# Patient Record
Sex: FEMALE | Race: WHITE | Employment: OTHER | URBAN - METROPOLITAN AREA
[De-identification: names, ages, dates, MRNs, and addresses within clinical notes are randomized per-mention and may not be internally consistent; named-entity substitution may affect disease eponyms.]

---

## 2017-05-02 ENCOUNTER — TELEPHONE (OUTPATIENT)
Dept: SURGERY | Age: 77
End: 2017-05-02

## 2017-05-02 ENCOUNTER — OFFICE VISIT (OUTPATIENT)
Dept: SURGERY | Age: 77
End: 2017-05-02

## 2017-05-02 VITALS
SYSTOLIC BLOOD PRESSURE: 148 MMHG | HEART RATE: 87 BPM | BODY MASS INDEX: 28.85 KG/M2 | HEIGHT: 64 IN | WEIGHT: 169 LBS | DIASTOLIC BLOOD PRESSURE: 61 MMHG

## 2017-05-02 DIAGNOSIS — N64.52 DISCHARGE FROM LEFT NIPPLE: Primary | ICD-10-CM

## 2017-05-02 RX ORDER — METRONIDAZOLE 250 MG/1
250 TABLET ORAL
COMMUNITY
Start: 2017-04-24 | End: 2021-01-05

## 2017-05-02 RX ORDER — METOPROLOL TARTRATE 50 MG/1
25 TABLET ORAL 2 TIMES DAILY
COMMUNITY
End: 2019-03-06

## 2017-05-02 RX ORDER — SIMVASTATIN 20 MG/1
TABLET, FILM COATED ORAL
COMMUNITY
End: 2019-02-18

## 2017-05-02 RX ORDER — HYDROCHLOROTHIAZIDE 12.5 MG/1
CAPSULE ORAL
COMMUNITY
Start: 2017-04-24 | End: 2019-02-18

## 2017-05-02 NOTE — PROGRESS NOTES
HISTORY OF PRESENT ILLNESS  Ines Hand is a 69 y.o. female. HPI  NEW patient consult referred by Dr. Blanca Doan for LEFT breast nipple discharge. 9 days ago, she noticed a drop of dark blood drain from her LEFT nipple. She sees discharge now on her bra. Denies palpable lumps. No skin changes or nipple retraction. No pain. Obstetric History       T0      TAB0   SAB0   E0   M0   L0    Obstetric Comments   Menarche:  16. [id number]. # of Children:  0. Age at Delivery of First Child:  n/a. Hysterectomy/oophorectomy:  YES/YES. Breast Bx:  Yes bilateral.  Hx of Breast Feeding:  n/a. BCP:  no. Hormone therapy:  no.      Family History:  Sister had breast cancer age 71 and is a surivor. Niece had ovarian cancer in her 42's and is a survivor. Mammogram done at [facility] in [facility], 2017, - calling for report    Review of Systems   Constitutional: Negative. Feeling too hot   HENT: Negative. Eyes: Negative. Respiratory: Negative. Cardiovascular: Positive for leg swelling. Gastrointestinal: Negative. Genitourinary: Positive for frequency. Musculoskeletal: Positive for back pain and joint pain. Skin: Negative. Neurological: Negative. Endo/Heme/Allergies: Negative. Psychiatric/Behavioral: Negative. Physical Exam   Constitutional: She is oriented to person, place, and time. She appears well-developed and well-nourished. Cardiovascular: Normal rate, regular rhythm and normal heart sounds. Pulmonary/Chest: Effort normal and breath sounds normal. Right breast exhibits no inverted nipple, no mass, no nipple discharge, no skin change and no tenderness. Left breast exhibits nipple discharge (dark brown discharge expressed from the UIQ LEFT nipple). Left breast exhibits no inverted nipple, no mass, no skin change and no tenderness. Breasts are symmetrical.   Lymphadenopathy:     She has no cervical adenopathy.      She has no axillary adenopathy. Right: No supraclavicular adenopathy present. Left: No supraclavicular adenopathy present. Neurological: She is alert and oriented to person, place, and time. Skin: Skin is warm and dry. BREAST ULTRASOUND  Indication: Left nipple discharge  Technique: The area was scanned using a high-frequency linear-array near-field transducer  Findings: dilated subareolar milk ducts. No intracystic mass  Impression: dilated subareolar milk ducts  Disposition: will schedule ductal excision  ASSESSMENT and PLAN    ICD-10-CM ICD-9-CM    1. Discharge from left nipple N64.52 611.79      LEFT breast nipple discharge  Will schedule ductal excision to resolve the discharge and diagnose the source of the discharge. 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 A small, early cancer is a possibility.

## 2017-05-02 NOTE — MR AVS SNAPSHOT
Visit Information Date & Time Provider Department Dept. Phone Encounter #  
 5/2/2017  2:30 PM Letty Miller MD [facility] [phone] [id number] Your Appointments 5/10/2017 11:15 AM  
SURGERY with Letty Miller MD  
[facility] ([facility]) Appt Note: [facility]-LEFT BREAST DUCTAL EXCISION  
 [address]  
  
   
 [place] [address] [address] [facility] Upcoming Health Maintenance Date Due DTaP/Tdap/Td series (1 - Tdap) 1/2/1962 ZOSTER VACCINE AGE 60> 1/2/2001 GLAUCOMA SCREENING Q2Y 1/2/2006 OSTEOPOROSIS SCREENING (DEXA) 1/2/2006 Pneumococcal 65+ Low/Medium Risk (1 of 2 - PCV13) 1/2/2006 MEDICARE YEARLY EXAM 1/2/2006 INFLUENZA AGE 9 TO ADULT 8/1/2017 Allergies as of 5/2/2017  Review Complete On: 5/2/2017 By: Tadeo Landa RN Severity Noted Reaction Type Reactions Codeine  05/02/2017    Nausea Only Morphine  05/02/2017    Nausea Only Current Immunizations  Never Reviewed No immunizations on file. Not reviewed this visit Vitals BP Pulse Height(growth percentile) Weight(growth percentile) BMI OB Status 148/61 87 5' 4\" (1.626 m) 169 lb (76.7 kg) 29.01 kg/m2 Hysterectomy Smoking Status Never Smoker BMI and BSA Data Body Mass Index Body Surface Area  
 29.01 kg/m 2 1.86 m 2 Your Updated Medication List  
  
   
This list is accurate as of: 5/2/17  4:44 PM.  Always use your most recent med list.  
  
  
  
  
 hydroCHLOROthiazide 12.5 mg capsule Commonly known as:  MICROZIDE  
  
 metoprolol tartrate 50 mg tablet Commonly known as:  LOPRESSOR Take 25 mg by mouth two (2) times a day. metroNIDAZOLE 250 mg tablet Commonly known as:  FLAGYL  
  
 simvastatin 20 mg tablet Commonly known as:  ZOCOR Take  by mouth nightly.   
  
  
  
  
To-Do List   
 05/05/2017 3:00 PM  
  Appointment with [facility] PAT ASSESSMENT B at [address] [place] [address] ([phone]) Patient Instructions Nipple Discharge: Care Instructions Your Care Instructions Fluid leaking from one or both nipples when you are not breastfeeding is called nipple discharge. Clear, cloudy, or white discharge that appears only when you press on your nipple is usually normal. The more the nipple is pressed or stimulated, the more fluid appears. Yellow, green, or brown discharge is not normal and may be a symptom of an infection or other problem. Spontaneous discharge appears without pressing or stimulating the nipple. This is not normal unless you are pregnant or breastfeeding. It may be a side effect of a medicine, or it may be caused by other health problems. The treatment of spontaneous nipple discharge depends on what is causing it. You may need additional tests to find out what is causing the nipple discharge. Follow-up care is a key part of your treatment and safety. Be sure to make and go to all appointments, and call your doctor if you are having problems. Its also a good idea to know your test results and keep a list of the medicines you take. How can you care for yourself at home? · If your doctor gave you medicine, take it exactly as prescribed. Call your doctor if you think you are having a problem with your medicine. · Wear a supportive bra, such as a sports bra or jog bra. · Avoid stimulating your breast until you have your follow-up appointment. When should you call for help? Call your doctor now or seek immediate medical care if: 
· You have symptoms of a breast infection, such as: 
¨ Increased pain, swelling, redness, or warmth around a breast. 
¨ Red streaks extending from the breast. 
¨ Pus draining from a breast. 
¨ A fever. Watch closely for changes in your health, and be sure to contact your doctor if: 
· You notice any changes in your breast or discharge. · You do not get better as expected. Where can you learn more? Go to http://candie-anthony.info/. Enter K414 in the search box to learn more about \"Nipple Discharge: Care Instructions. \" Current as of: May 27, 2016 Content Version: 11.2 © 5436-1340 [employer]. Care instructions adapted under license by Tanium (which disclaims liability or warranty for this information). If you have questions about a medical condition or this instruction, always ask your healthcare professional. [address] any warranty or liability for your use of this information. Nipple Discharge: Care Instructions Your Care Instructions Fluid leaking from one or both nipples when you are not breastfeeding is called nipple discharge. Clear, cloudy, or white discharge that appears only when you press on your nipple is usually normal. The more the nipple is pressed or stimulated, the more fluid appears. Yellow, green, or brown discharge is not normal and may be a symptom of an infection or other problem. Spontaneous discharge appears without pressing or stimulating the nipple. This is not normal unless you are pregnant or breastfeeding. It may be a side effect of a medicine, or it may be caused by other health problems. The treatment of spontaneous nipple discharge depends on what is causing it. You may need additional tests to find out what is causing the nipple discharge. Follow-up care is a key part of your treatment and safety. Be sure to make and go to all appointments, and call your doctor if you are having problems. Its also a good idea to know your test results and keep a list of the medicines you take. How can you care for yourself at home? · If your doctor gave you medicine, take it exactly as prescribed. Call your doctor if you think you are having a problem with your medicine. · Wear a supportive bra, such as a sports bra or jog bra. · Avoid stimulating your breast until you have your follow-up appointment. When should you call for help? Call your doctor now or seek immediate medical care if: 
· You have symptoms of a breast infection, such as: 
¨ Increased pain, swelling, redness, or warmth around a breast. 
¨ Red streaks extending from the breast. 
¨ Pus draining from a breast. 
¨ A fever. Watch closely for changes in your health, and be sure to contact your doctor if: 
· You notice any changes in your breast or discharge. · You do not get better as expected. Where can you learn more? Go to http://candie-anthony.info/. Enter W528 in the search box to learn more about \"Nipple Discharge: Care Instructions. \" Current as of: May 27, 2016 Content Version: 11.2 © 1310-7768 [employer]. Care instructions adapted under license by ProZyme (which disclaims liability or warranty for this information). If you have questions about a medical condition or this instruction, always ask your healthcare professional. [place] [address] any warranty or liability for your use of this information. Introducing [facility]! [employer] introduces [employer] patient portal. Now you can access parts of your medical record, email your doctor's office, and request medication refills online. 1. In your internet browser, go to [email or web address] 2. Click on the First Time User? Click Here link in the Sign In box. You will see the New Member Sign Up page. 3. Enter your [employer] Access Code exactly as it appears below. You will not need to use this code after youve completed the sign-up process. If you do not sign up before the expiration date, you must request a new code. · [employer] Access Code: [email or web address] Expires: 7/31/2017  1:57 PM 
 
4.  Enter the last four digits of your Social Security Number (xxxx) and Date of Birth (mm/dd/yyyy) as indicated and click Submit. You will be taken to the next sign-up page. 5. Create a [employer] ID. This will be your [employer] login ID and cannot be changed, so think of one that is secure and easy to remember. 6. Create a [employer] password. You can change your password at any time. 7. Enter your Password Reset Question and Answer. This can be used at a later time if you forget your password. 8. Enter your e-mail address. You will receive e-mail notification when new information is available in [address] 9. Click Sign Up. You can now view and download portions of your medical record. 10. Click the Download Summary menu link to download a portable copy of your medical information. If you have questions, please visit the Frequently Asked Questions section of the [employer] website. Remember, [employer] is NOT to be used for urgent needs. For medical emergencies, dial 911. Now available from your iPhone and Android! Please provide this summary of care documentation to your next provider. If you have any questions after today's visit, please call [phone].

## 2017-05-02 NOTE — PATIENT INSTRUCTIONS
Nipple Discharge: Care Instructions  Your Care Instructions  Fluid leaking from one or both nipples when you are not breastfeeding is called nipple discharge. Clear, cloudy, or white discharge that appears only when you press on your nipple is usually normal. The more the nipple is pressed or stimulated, the more fluid appears. Yellow, green, or brown discharge is not normal and may be a symptom of an infection or other problem. Spontaneous discharge appears without pressing or stimulating the nipple. This is not normal unless you are pregnant or breastfeeding. It may be a side effect of a medicine, or it may be caused by other health problems. The treatment of spontaneous nipple discharge depends on what is causing it. You may need additional tests to find out what is causing the nipple discharge. Follow-up care is a key part of your treatment and safety. Be sure to make and go to all appointments, and call your doctor if you are having problems. Its also a good idea to know your test results and keep a list of the medicines you take. How can you care for yourself at home? · If your doctor gave you medicine, take it exactly as prescribed. Call your doctor if you think you are having a problem with your medicine. · Wear a supportive bra, such as a sports bra or jog bra. · Avoid stimulating your breast until you have your follow-up appointment. When should you call for help? Call your doctor now or seek immediate medical care if:  · You have symptoms of a breast infection, such as:  ¨ Increased pain, swelling, redness, or warmth around a breast.  ¨ Red streaks extending from the breast.  ¨ Pus draining from a breast.  ¨ A fever. Watch closely for changes in your health, and be sure to contact your doctor if:  · You notice any changes in your breast or discharge. · You do not get better as expected. Where can you learn more? Go to http://candie-anthony.info/.   Enter X737 in the search box to learn more about \"Nipple Discharge: Care Instructions. \"  Current as of: May 27, 2016  Content Version: 11.2  © 7069-0344 [employer]. Care instructions adapted under license by Delivery Agent (which disclaims liability or warranty for this information). If you have questions about a medical condition or this instruction, always ask your healthcare professional. [address] any warranty or liability for your use of this information. Nipple Discharge: Care Instructions  Your Care Instructions  Fluid leaking from one or both nipples when you are not breastfeeding is called nipple discharge. Clear, cloudy, or white discharge that appears only when you press on your nipple is usually normal. The more the nipple is pressed or stimulated, the more fluid appears. Yellow, green, or brown discharge is not normal and may be a symptom of an infection or other problem. Spontaneous discharge appears without pressing or stimulating the nipple. This is not normal unless you are pregnant or breastfeeding. It may be a side effect of a medicine, or it may be caused by other health problems. The treatment of spontaneous nipple discharge depends on what is causing it. You may need additional tests to find out what is causing the nipple discharge. Follow-up care is a key part of your treatment and safety. Be sure to make and go to all appointments, and call your doctor if you are having problems. Its also a good idea to know your test results and keep a list of the medicines you take. How can you care for yourself at home? · If your doctor gave you medicine, take it exactly as prescribed. Call your doctor if you think you are having a problem with your medicine. · Wear a supportive bra, such as a sports bra or jog bra. · Avoid stimulating your breast until you have your follow-up appointment. When should you call for help?   Call your doctor now or seek immediate medical care if:  · You have symptoms of a breast infection, such as:  ¨ Increased pain, swelling, redness, or warmth around a breast.  ¨ Red streaks extending from the breast.  ¨ Pus draining from a breast.  ¨ A fever. Watch closely for changes in your health, and be sure to contact your doctor if:  · You notice any changes in your breast or discharge. · You do not get better as expected. Where can you learn more? Go to http://candie-anthony.info/. Enter P989 in the search box to learn more about \"Nipple Discharge: Care Instructions. \"  Current as of: May 27, 2016  Content Version: 11.2  © 2783-3349 [employer]. Care instructions adapted under license by Sk[employer] (which disclaims liability or warranty for this information). If you have questions about a medical condition or this instruction, always ask your healthcare professional. [address] any warranty or liability for your use of this information.

## 2017-05-03 DIAGNOSIS — N64.52 NIPPLE DISCHARGE: Primary | ICD-10-CM

## 2017-05-03 NOTE — TELEPHONE ENCOUNTER
SURGERY SCHEDULED AT [facility] ON 5-10-17 AT 11:45 AM; PATIENT TO ARRIVE AT 9:45 AM  PAT SCHEDULED AT [facility] ON 5-5-17 AT 3 PM; PT TO ARRIVE AT 2:30 PM  PATIENT NOTIFIED

## 2017-05-03 NOTE — COMMUNICATION BODY
HISTORY OF PRESENT ILLNESS  Ramiro Hendrix is a 69 y.o. female. HPI  NEW patient consult referred by Dr. BYERS [facility] for LEFT breast nipple discharge. 9 days ago, she noticed a drop of dark blood drain from her LEFT nipple. She sees discharge now on her bra. Denies palpable lumps. No skin changes or nipple retraction. No pain. Obstetric History       T0      TAB0   SAB0   E0   M0   L0    Obstetric Comments   Menarche:  16. [id number]. # of Children:  0. Age at Delivery of First Child:  n/a. Hysterectomy/oophorectomy:  YES/YES. Breast Bx:  Yes bilateral.  Hx of Breast Feeding:  n/a. BCP:  no. Hormone therapy:  no.      Family History:  Sister had breast cancer age 71 and is a surivor. Niece had ovarian cancer in her 42's and is a survivor. Mammogram done at [facility] in [facility], 2017, - calling for report    Review of Systems   Constitutional: Negative. Feeling too hot   HENT: Negative. Eyes: Negative. Respiratory: Negative. Cardiovascular: Positive for leg swelling. Gastrointestinal: Negative. Genitourinary: Positive for frequency. Musculoskeletal: Positive for back pain and joint pain. Skin: Negative. Neurological: Negative. Endo/Heme/Allergies: Negative. Psychiatric/Behavioral: Negative. Physical Exam   Constitutional: She is oriented to person, place, and time. She appears well-developed and well-nourished. Cardiovascular: Normal rate, regular rhythm and normal heart sounds. Pulmonary/Chest: Effort normal and breath sounds normal. Right breast exhibits no inverted nipple, no mass, no nipple discharge, no skin change and no tenderness. Left breast exhibits nipple discharge (dark brown discharge expressed from the UIQ LEFT nipple). Left breast exhibits no inverted nipple, no mass, no skin change and no tenderness. Breasts are symmetrical.   Lymphadenopathy:     She has no cervical adenopathy.      She has no axillary adenopathy. Right: No supraclavicular adenopathy present. Left: No supraclavicular adenopathy present. Neurological: She is alert and oriented to person, place, and time. Skin: Skin is warm and dry. BREAST ULTRASOUND  Indication: Left nipple discharge  Technique: The area was scanned using a high-frequency linear-array near-field transducer  Findings: dilated subareolar milk ducts. No intracystic mass  Impression: dilated subareolar milk ducts  Disposition: will schedule ductal excision  ASSESSMENT and PLAN    ICD-10-CM ICD-9-CM    1. Discharge from left nipple N64.52 611.79      LEFT breast nipple discharge  Will schedule ductal excision to resolve the discharge and diagnose the source of the discharge. 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 A small, early cancer is a possibility.

## 2017-05-05 ENCOUNTER — HOSPITAL ENCOUNTER (OUTPATIENT)
Dept: PREADMISSION TESTING | Age: 77
Discharge: HOME | End: 2017-05-05
Payer: MEDICARE

## 2017-05-05 VITALS
OXYGEN SATURATION: 96 % | TEMPERATURE: 97.6 F | BODY MASS INDEX: 29.32 KG/M2 | HEART RATE: 70 BPM | SYSTOLIC BLOOD PRESSURE: 148 MMHG | HEIGHT: 64 IN | WEIGHT: 171.74 LBS | RESPIRATION RATE: 16 BRPM | DIASTOLIC BLOOD PRESSURE: 66 MMHG

## 2017-05-05 LAB
ANION GAP BLD CALC-SCNC: 13 MMOL/L (ref 5–15)
ATRIAL RATE: 67 BPM
BUN SERPL-MCNC: 36 MG/DL (ref 6–20)
BUN/CREAT SERPL: 29 (ref 12–20)
CALCIUM SERPL-MCNC: 9.2 MG/DL (ref 8.5–10.1)
CALCULATED P AXIS, ECG09: 50 DEGREES
CALCULATED R AXIS, ECG10: 42 DEGREES
CALCULATED T AXIS, ECG11: 30 DEGREES
CHLORIDE SERPL-SCNC: 103 MMOL/L (ref 97–108)
CO2 SERPL-SCNC: 26 MMOL/L (ref 21–32)
CREAT SERPL-MCNC: 1.26 MG/DL (ref 0.55–1.02)
DIAGNOSIS, 93000: NORMAL
GLUCOSE SERPL-MCNC: 91 MG/DL (ref 65–100)
P-R INTERVAL, ECG05: 186 MS
POTASSIUM SERPL-SCNC: 4 MMOL/L (ref 3.5–5.1)
Q-T INTERVAL, ECG07: 402 MS
QRS DURATION, ECG06: 92 MS
QTC CALCULATION (BEZET), ECG08: 424 MS
SODIUM SERPL-SCNC: 142 MMOL/L (ref 136–145)
VENTRICULAR RATE, ECG03: 67 BPM

## 2017-05-05 PROCEDURE — 80048 BASIC METABOLIC PNL TOTAL CA: CPT | Performed by: ANESTHESIOLOGY

## 2017-05-05 PROCEDURE — 93005 ELECTROCARDIOGRAM TRACING: CPT

## 2017-05-05 PROCEDURE — 36415 COLL VENOUS BLD VENIPUNCTURE: CPT | Performed by: ANESTHESIOLOGY

## 2017-05-05 NOTE — PERIOP NOTES
[facility]  PREOPERATIVE INSTRUCTIONS    Surgery Date:   5/10/2017  Surgery arrival time given by surgeon: Titi Keane by Dr. Elen Salas' office to arrive at 9:45am day of surgery      1. Please report at the designated time to the 2nd [address] [facility]. Bring your insurance card, photo identification, and any copayment ( if applicable). 2. You must have a responsible adult to drive you home. You need to have a responsible adult to stay with you the first 24 hours after surgery if you are going home the same day of your surgery and you should not drive a car for 24 hours following your surgery. 3. Nothing to eat or drink after midnight the night before surgery. This includes no water, gum, mints, coffee, juice, etc.  Please note special instructions, if applicable, below for medications. 4. MEDICATIONS TO TAKE THE MORNING OF SURGERY WITH A SIP OF WATER: Take Metoprolol but hold hydrochlorothiazide morning of surgery. 5. No alcoholic beverages 24 hours before or after your surgery. 6. If you are being admitted to the hospital,please leave personal belongings/luggage in your car until you have an assigned hospital room number. 7. Stop Aspirin and/or any non-steroidal anti-inflammatory drugs (i.e. Ibuprofen, Naproxen, Advil, Aleve) as directed by your surgeon. You may take Tylenol. Stop herbal supplements 1 week prior to  surgery. 8. If you are currently taking Plavix, Coumadin,or any other blood-thinning/anticoagulant medication contact your surgeon for instructions. 9. Please wear comfortable clothes. Wear your glasses instead of contacts. We ask that all money, jewelry and valuables be left at home. Wear no make up, particularly mascara, the day of surgery. 10.  All body piercings, rings,and jewelry need to be removed and left at home. Please wear your hair loose or down. Please no pony-tails, buns, or any metal hair accessories.  If you shower the morning of surgery, please do not apply any lotions, powders, or deodorants afterwards. Do not shave any body area within 24 hours of your surgery. 11. Please follow all instructions to avoid any potential surgical cancellation. 12.  Should your physical condition change, (i.e. fever, cold, flu, etc.) please notify your surgeon as soon as possible. 13. It is important to be on time. If a situation occurs where you may be delayed, please call:  [phone] / [phone] on the day of surgery. 14. The Preadmission Testing staff can be reached at 21 [phone]. .  15. Special instructions: free  parking 7-5    The patient was contacted  in person. She  verbalize  understanding of all instructions does not  need reinforcement.

## 2017-05-09 ENCOUNTER — ANESTHESIA EVENT (OUTPATIENT)
Dept: SURGERY | Age: 77
End: 2017-05-09
Payer: MEDICARE

## 2017-05-10 ENCOUNTER — HOSPITAL ENCOUNTER (OUTPATIENT)
Age: 77
Setting detail: OUTPATIENT SURGERY
Discharge: HOME | End: 2017-05-10
Attending: SURGERY | Admitting: SURGERY
Payer: MEDICARE

## 2017-05-10 ENCOUNTER — ANESTHESIA (OUTPATIENT)
Dept: SURGERY | Age: 77
End: 2017-05-10
Payer: MEDICARE

## 2017-05-10 VITALS
BODY MASS INDEX: 29.09 KG/M2 | WEIGHT: 170.42 LBS | HEART RATE: 78 BPM | RESPIRATION RATE: 18 BRPM | SYSTOLIC BLOOD PRESSURE: 132 MMHG | TEMPERATURE: 98.2 F | DIASTOLIC BLOOD PRESSURE: 51 MMHG | HEIGHT: 64 IN | OXYGEN SATURATION: 96 %

## 2017-05-10 DIAGNOSIS — N64.52 NIPPLE DISCHARGE: ICD-10-CM

## 2017-05-10 PROCEDURE — 74011000250 HC RX REV CODE- 250: Performed by: ANESTHESIOLOGY

## 2017-05-10 PROCEDURE — 77030032490 HC SLV COMPR SCD KNE COVD -B: Performed by: SURGERY

## 2017-05-10 PROCEDURE — 74011000250 HC RX REV CODE- 250: Performed by: SURGERY

## 2017-05-10 PROCEDURE — 76030000000 HC AMB SURG OR TIME 0.5 TO 1: Performed by: SURGERY

## 2017-05-10 PROCEDURE — 77030020782 HC GWN BAIR PAWS FLX 3M -B

## 2017-05-10 PROCEDURE — 77030010507 HC ADH SKN DERMBND J&J -B: Performed by: SURGERY

## 2017-05-10 PROCEDURE — 77030002933 HC SUT MCRYL J&J -A: Performed by: SURGERY

## 2017-05-10 PROCEDURE — 74011250636 HC RX REV CODE- 250/636

## 2017-05-10 PROCEDURE — 74011250636 HC RX REV CODE- 250/636: Performed by: ANESTHESIOLOGY

## 2017-05-10 PROCEDURE — 76210000040 HC AMBSU PH I REC FIRST 0.5 HR: Performed by: SURGERY

## 2017-05-10 PROCEDURE — 88307 TISSUE EXAM BY PATHOLOGIST: CPT | Performed by: SURGERY

## 2017-05-10 PROCEDURE — 77030002996 HC SUT SLK J&J -A: Performed by: SURGERY

## 2017-05-10 PROCEDURE — 74011250637 HC RX REV CODE- 250/637: Performed by: ANESTHESIOLOGY

## 2017-05-10 PROCEDURE — 77030018836 HC SOL IRR NACL ICUM -A: Performed by: SURGERY

## 2017-05-10 PROCEDURE — 76060000061 HC AMB SURG ANES 0.5 TO 1 HR: Performed by: SURGERY

## 2017-05-10 PROCEDURE — 76210000057 HC AMBSU PH II REC 1 TO 1.5 HR: Performed by: SURGERY

## 2017-05-10 PROCEDURE — 77030031139 HC SUT VCRL2 J&J -A: Performed by: SURGERY

## 2017-05-10 RX ORDER — HYDROMORPHONE HYDROCHLORIDE 1 MG/ML
.25-1 INJECTION, SOLUTION INTRAMUSCULAR; INTRAVENOUS; SUBCUTANEOUS
Status: DISCONTINUED | OUTPATIENT
Start: 2017-05-10 | End: 2017-05-10 | Stop reason: HOSPADM

## 2017-05-10 RX ORDER — HYDROCODONE BITARTRATE AND ACETAMINOPHEN 5; 325 MG/1; MG/1
1-2 TABLET ORAL
Qty: 12 TAB | Refills: 0 | Status: SHIPPED | OUTPATIENT
Start: 2017-05-10 | End: 2019-03-06

## 2017-05-10 RX ORDER — PROPOFOL 10 MG/ML
INJECTION, EMULSION INTRAVENOUS AS NEEDED
Status: DISCONTINUED | OUTPATIENT
Start: 2017-05-10 | End: 2017-05-10 | Stop reason: HOSPADM

## 2017-05-10 RX ORDER — ONDANSETRON 2 MG/ML
INJECTION INTRAMUSCULAR; INTRAVENOUS AS NEEDED
Status: DISCONTINUED | OUTPATIENT
Start: 2017-05-10 | End: 2017-05-10 | Stop reason: HOSPADM

## 2017-05-10 RX ORDER — PROPOFOL 10 MG/ML
INJECTION, EMULSION INTRAVENOUS
Status: DISCONTINUED | OUTPATIENT
Start: 2017-05-10 | End: 2017-05-10 | Stop reason: HOSPADM

## 2017-05-10 RX ORDER — NALOXONE HYDROCHLORIDE 0.4 MG/ML
0.2 INJECTION, SOLUTION INTRAMUSCULAR; INTRAVENOUS; SUBCUTANEOUS
Status: DISCONTINUED | OUTPATIENT
Start: 2017-05-10 | End: 2017-05-10 | Stop reason: HOSPADM

## 2017-05-10 RX ORDER — FLUMAZENIL 0.1 MG/ML
0.2 INJECTION INTRAVENOUS
Status: DISCONTINUED | OUTPATIENT
Start: 2017-05-10 | End: 2017-05-10 | Stop reason: HOSPADM

## 2017-05-10 RX ORDER — SODIUM CHLORIDE, SODIUM LACTATE, POTASSIUM CHLORIDE, CALCIUM CHLORIDE 600; 310; 30; 20 MG/100ML; MG/100ML; MG/100ML; MG/100ML
125 INJECTION, SOLUTION INTRAVENOUS CONTINUOUS
Status: DISCONTINUED | OUTPATIENT
Start: 2017-05-10 | End: 2017-05-10 | Stop reason: HOSPADM

## 2017-05-10 RX ORDER — MIDAZOLAM HYDROCHLORIDE 1 MG/ML
INJECTION, SOLUTION INTRAMUSCULAR; INTRAVENOUS AS NEEDED
Status: DISCONTINUED | OUTPATIENT
Start: 2017-05-10 | End: 2017-05-10 | Stop reason: HOSPADM

## 2017-05-10 RX ORDER — LIDOCAINE HYDROCHLORIDE 10 MG/ML
0.1 INJECTION, SOLUTION EPIDURAL; INFILTRATION; INTRACAUDAL; PERINEURAL AS NEEDED
Status: DISCONTINUED | OUTPATIENT
Start: 2017-05-10 | End: 2017-05-10 | Stop reason: HOSPADM

## 2017-05-10 RX ORDER — BUPIVACAINE HYDROCHLORIDE AND EPINEPHRINE 5; 5 MG/ML; UG/ML
INJECTION, SOLUTION EPIDURAL; INTRACAUDAL; PERINEURAL AS NEEDED
Status: DISCONTINUED | OUTPATIENT
Start: 2017-05-10 | End: 2017-05-10 | Stop reason: HOSPADM

## 2017-05-10 RX ORDER — FENTANYL CITRATE 50 UG/ML
INJECTION, SOLUTION INTRAMUSCULAR; INTRAVENOUS AS NEEDED
Status: DISCONTINUED | OUTPATIENT
Start: 2017-05-10 | End: 2017-05-10 | Stop reason: HOSPADM

## 2017-05-10 RX ORDER — METOPROLOL TARTRATE 25 MG/1
25 TABLET, FILM COATED ORAL ONCE
Status: COMPLETED | OUTPATIENT
Start: 2017-05-10 | End: 2017-05-10

## 2017-05-10 RX ORDER — DIPHENHYDRAMINE HYDROCHLORIDE 50 MG/ML
12.5 INJECTION, SOLUTION INTRAMUSCULAR; INTRAVENOUS AS NEEDED
Status: DISCONTINUED | OUTPATIENT
Start: 2017-05-10 | End: 2017-05-10 | Stop reason: HOSPADM

## 2017-05-10 RX ORDER — MIDAZOLAM HYDROCHLORIDE 1 MG/ML
2 INJECTION, SOLUTION INTRAMUSCULAR; INTRAVENOUS
Status: DISCONTINUED | OUTPATIENT
Start: 2017-05-10 | End: 2017-05-10 | Stop reason: HOSPADM

## 2017-05-10 RX ORDER — BUPIVACAINE HYDROCHLORIDE AND EPINEPHRINE 5; 5 MG/ML; UG/ML
30 INJECTION, SOLUTION EPIDURAL; INTRACAUDAL; PERINEURAL
Status: DISCONTINUED | OUTPATIENT
Start: 2017-05-10 | End: 2017-05-10 | Stop reason: HOSPADM

## 2017-05-10 RX ADMIN — MIDAZOLAM HYDROCHLORIDE 2 MG: 1 INJECTION, SOLUTION INTRAMUSCULAR; INTRAVENOUS at 11:03

## 2017-05-10 RX ADMIN — ONDANSETRON 4 MG: 2 INJECTION INTRAMUSCULAR; INTRAVENOUS at 11:30

## 2017-05-10 RX ADMIN — FENTANYL CITRATE 50 MCG: 50 INJECTION, SOLUTION INTRAMUSCULAR; INTRAVENOUS at 11:06

## 2017-05-10 RX ADMIN — SODIUM CHLORIDE, SODIUM LACTATE, POTASSIUM CHLORIDE, AND CALCIUM CHLORIDE 125 ML/HR: 600; 310; 30; 20 INJECTION, SOLUTION INTRAVENOUS at 10:08

## 2017-05-10 RX ADMIN — LIDOCAINE HYDROCHLORIDE 0.1 ML: 10 INJECTION, SOLUTION EPIDURAL; INFILTRATION; INTRACAUDAL; PERINEURAL at 10:08

## 2017-05-10 RX ADMIN — METOPROLOL TARTRATE 25 MG: 25 TABLET ORAL at 11:01

## 2017-05-10 RX ADMIN — FENTANYL CITRATE 50 MCG: 50 INJECTION, SOLUTION INTRAMUSCULAR; INTRAVENOUS at 11:23

## 2017-05-10 RX ADMIN — PROPOFOL 30 MG: 10 INJECTION, EMULSION INTRAVENOUS at 11:09

## 2017-05-10 RX ADMIN — PROPOFOL 50 MCG/KG/MIN: 10 INJECTION, EMULSION INTRAVENOUS at 11:09

## 2017-05-10 NOTE — DISCHARGE INSTRUCTIONS
Discharge Instructions from Dr. Carlos Escobar    Patient Discharge Instructions    Geraldine Joseph / [id number] : 1940    Admitted 5/10/2017 Discharged: 5/10/2017     Pain medication can cause constipation. You may need laxatives, Colace, Prune juice or the like in the post operative period. What to do at Home    Diet:Regular    Activity: As tolerated. No driving if taking pain medication. Wound care: Ice pack to wound 20 minutes per hour for 24 hours. May continue ice pack longer if needed. Okay to shower or take a bath. Dressing: The skin glue is waterproof. It is okay to wash normally at this site. If the glue is still present after 10 days you should peel it off. Pain:  You may use over the counter pain medication (Ibuprofen, acetaminophen etc) or Hydrocodone if strong pain medication is needed. Follow up: One week if needed (call the office for an appointment: [phone]). I will call with results in one week. Problems/Questions: Call Alcira Villanueva MD on my cell phone. [phone]                  How to Care for Your Wound After Its Treated With  DERMABOND®  Topical Skin Adhesive    DERMABOND® Topical Skin Adhesive (2-octyl cyanoacrylate) is a sterile, liquid skin adhesive that holds wound edges together. The film will usually remain in place for 5-10 days, then naturally fall off your skin. The following will answer some of your questions and provide instructions for proper care for your wound while it is healing:    CHECK THE WOUND APPEARANCE   Some swelling, redness, and pain are common with all wounds and normally will go away as the wound heals. If swelling, redness, or pain increase or if the wound feels warm to the touch, contact your doctor. Also contact your doctor if the wound edges reopen or separate. REPLACE BANDAGES   If your wound is bandaged, keep the bandage dry.    Replace the bandage daily until the adhesive film has fallen off of if the bandage should become wet, unless otherwise instructed by your doctor.  When changing the dressing, do not place tape directly over the DERMABOND adhesive film, because removing the tape later may also remove the film. AVOIDING TOPICAL MEDICATIONS   Do not apply liquid or ointment medications or any other product to your wound while the DERMABOND adhesive film is in place. These may loosen the film before your wound is healed. KEEP WOUND DRY AND PROTECTED   You may occasionally and briefly wet your wound in the shower if permitted by your surgeon. Do not soak or scrub your wound, do not swim, and avoid periods of heavy perspiration until the DERMABOND has naturally fallen off. After showering, gently blot your wound dry with a soft towel. If a protective dressing is being used, apply a fresh, dry bandage, being sure to keep the tape off the DERMABOND adhesive film.  Apply a clean, dry bandage over the wound if necessary to protect it.  Protect your wound from injury until the skin has had sufficient time to heal   Do not scratch, rub, or pick at the DERMABOND adhesive film. This may loosen the film before your wound is healed.  Protect the wound from prolonged exposure to sunlight or tanning lamps while the film is in place. If you have any questions or concerns about this product, please consult your doctor. ® DERMABOND is a registered [address] [place] [address]. 2002                DISCHARGE SUMMARY from your Nurse      PATIENT INSTRUCTIONS    After general anesthesia or intravenous sedation, for 24 hours or while taking prescription Narcotics:  · Limit your activities  · Do not drive and operate hazardous machinery  · Do not make important personal or business decisions  · Do  not drink alcoholic beverages  · If you have not urinated within 8 hours after discharge, please contact your surgeon on call.     Report the following to your surgeon:  · Excessive pain, swelling, redness or odor of or around the surgical area  · Temperature over 100.5  · Nausea and vomiting lasting longer than 4 hours or if unable to take medications  · Any signs of decreased circulation or nerve impairment to extremity: change in color, persistent  numbness, tingling, coldness or increase pain  · Any questions      COUGH AND DEEP BREATHE    Breathing deeply and coughing are very important exercises to do after surgery. Deep breathing and coughing open the little air tubes and air sacks in your lungs. You take deep breaths every day. You may not even notice - it is just something you do when you sigh or yawn. It is a natural exercise you do to keep these air passages open. After surgery, take deep breaths and cough, on purpose. DIRECTIONS:  · Take 10 to 15 slow deep breaths every hour while awake. · Breathe in deeply, and hold it for 2 seconds. · Exhale slowly through puckered lips, like blowing up a balloon. · After every 4th or 5th deep breath, hug your pillow to your chest or belly and give a hard, deep cough. Yes, it will probably hurt. But doing this exercise is a very important part of healing after surgery. Take your pain medicine to help you do this exercise without too much pain. Coughing and deep breathing help prevent bronchitis and pneumonia after surgery. If you had chest or belly surgery, use a pillow as a \"hug katelynn\" and hold it tightly to your chest or belly when you cough. ANKLE PUMPS    Ankle pumps increase the circulation of oxygenated blood to your lower extremities and decrease your risk for circulation problems such as blood clots. They also stretch the muscles, tendons and ligaments in your foot and ankle, and prevent joint contracture in the ankle and foot, especially after surgeries on the legs. It is important to do ankle pump exercises regularly after surgery because immobility increases your risk for developing a blood clot.   Your doctor may also have you take an Aspirin for the next few days as well. If your doctor did not ask you to take an Aspirin, consult with him before starting Aspirin therapy on your own. The exercise is quite simple. · Slowly point your foot forward, feeling the muscles on the top of your lower leg stretch, and hold this position for 5 seconds. · Next, pull your foot back toward you as far as possible, stretching the calf muscles, and hold that position for 5 seconds. · Repeat with the other foot. · Perform 10 repetitions every hour while awake for both ankles if possible (down and then up with the foot once is one repetition). You should feel gentle stretching of the muscles in your lower leg when doing this exercise. If you feel pain, or your range of motion is limited, don't push too hard. Only go the limit your joint and muscles will let you go. If you have increasing pain, progressively worsening leg warmth or swelling, STOP the exercise and call your doctor. MEDICATION AND   SIDE EFFECT GUIDE    The [facility] MEDICATION AND SIDE EFFECT GUIDE was provided to the PATIENT AND CARE PROVIDER. Information provided includes instruction about drug purpose and common side effects for the following medications:   · HYDROCODONE/ACETAMINOPHEN        These are general instructions for a healthy lifestyle:    *   Please give a list of your current medications to your Primary Care Provider. *   Please update this list whenever your medications are discontinued, doses are changed, or new medications (including over-the-counter products) are added. *   Please carry medication information at all times in case of emergency situations. About Smoking  No smoking / No tobacco products  Avoid exposure to second hand smoke     Surgeon General's Warning:  Quitting smoking now greatly reduces serious risk to your health.     Obesity, smoking, and sedentary lifestyle greatly increases your risk for illness and disease. A healthy diet, regular physical exercise & weight monitoring are important for maintaining a healthy lifestyle. Congestive Heart Failure  You may be retaining fluid if you have a history of heart failure or if you experience any of the following symptoms:  Weight gain of 3 pounds or more overnight or 5 pounds in a week, increased swelling in your hands or feet or shortness of breath while lying flat in bed. Please call your doctor as soon as you notice any of these symptoms; do not wait until your next office visit. Recognize signs and symptoms of STROKE:  F -  Face looks uneven  A -  Arms unable to move or move evenly  S -  Speech slurred or non-existent  T -  Time-call 911 as soon as signs and symptoms begin-DO NOT go          back to bed or wait to see if you get better-TIME IS BRAIN. Warning Signs of HEART ATTACK   Call 911 if you have these symptoms:     Chest discomfort. Most heart attacks involve discomfort in the center of the chest that lasts more than a few minutes, or that goes away and comes back. It can feel like uncomfortable pressure, squeezing, fullness, or pain.  Discomfort in other areas of the upper body. Symptoms can include pain or discomfort in one or both arms, the back, neck, jaw, or stomach.  Shortness of breath with or without chest discomfort.  Other signs may include breaking out in a cold sweat, nausea, or lightheadedness. Don't wait more than five minutes to call 911 - MINUTES MATTER! Fast action can save your life. Calling 911 is almost always the fastest way to get lifesaving treatment. Emergency Medical Services staff can begin treatment when they arrive -- up to an hour sooner than if someone gets to the hospital by car. The discharge information has been reviewed with the patient and caregiver. Any questions and concerns from the patient and caregiver have been addressed.   The patient and caregiver verbalized understanding. Other information in your discharge envelope:  [x]     PRESCRIPTIONS  []     PHYSICAL THERAPY PRESCRIPTION  []     APPOINTMENT CARDS  []     Regional Anesthesia Pamphlet for block or block with On-Q Catheter from   Anesthesia Service  []     Medical device information sheets/pamphlets from their    []     School/work excuse note. []     /parent work excuse note. The following personal items collected during your admission are returned to you:   Dental Appliance: Dental Appliances: Uppers  Vision:    Hearing Aid:    Jewelry: Jewelry: None  Clothing: Clothing: Pants, Undergarments, Shirt, Footwear  Other Valuables:  Other Valuables: None  Valuables sent to safe: Personal Items Sent to Safe: n/a

## 2017-05-10 NOTE — H&P (VIEW-ONLY)
HISTORY OF PRESENT ILLNESS  Dora Lockwood is a 69 y.o. female. HPI  NEW patient consult referred by Dr. Debby Mcneill for LEFT breast nipple discharge. 9 days ago, she noticed a drop of dark blood drain from her LEFT nipple. She sees discharge now on her bra. Denies palpable lumps. No skin changes or nipple retraction. No pain. Obstetric History       T0      TAB0   SAB0   E0   M0   L0    Obstetric Comments   Menarche:  16. [id number]. # of Children:  0. Age at Delivery of First Child:  n/a. Hysterectomy/oophorectomy:  YES/YES. Breast Bx:  Yes bilateral.  Hx of Breast Feeding:  n/a. BCP:  no. Hormone therapy:  no.      Family History:  Sister had breast cancer age 71 and is a surivor. Niece had ovarian cancer in her 42's and is a survivor. Mammogram done at [facility] in [facility], 2017, - calling for report    Review of Systems   Constitutional: Negative. Feeling too hot   HENT: Negative. Eyes: Negative. Respiratory: Negative. Cardiovascular: Positive for leg swelling. Gastrointestinal: Negative. Genitourinary: Positive for frequency. Musculoskeletal: Positive for back pain and joint pain. Skin: Negative. Neurological: Negative. Endo/Heme/Allergies: Negative. Psychiatric/Behavioral: Negative. Physical Exam   Constitutional: She is oriented to person, place, and time. She appears well-developed and well-nourished. Cardiovascular: Normal rate, regular rhythm and normal heart sounds. Pulmonary/Chest: Effort normal and breath sounds normal. Right breast exhibits no inverted nipple, no mass, no nipple discharge, no skin change and no tenderness. Left breast exhibits nipple discharge (dark brown discharge expressed from the UIQ LEFT nipple). Left breast exhibits no inverted nipple, no mass, no skin change and no tenderness. Breasts are symmetrical.   Lymphadenopathy:     She has no cervical adenopathy.      She has no axillary adenopathy. Right: No supraclavicular adenopathy present. Left: No supraclavicular adenopathy present. Neurological: She is alert and oriented to person, place, and time. Skin: Skin is warm and dry. BREAST ULTRASOUND  Indication: Left nipple discharge  Technique: The area was scanned using a high-frequency linear-array near-field transducer  Findings: dilated subareolar milk ducts. No intracystic mass  Impression: dilated subareolar milk ducts  Disposition: will schedule ductal excision  ASSESSMENT and PLAN    ICD-10-CM ICD-9-CM    1. Discharge from left nipple N64.52 611.79      LEFT breast nipple discharge  Will schedule ductal excision to resolve the discharge and diagnose the source of the discharge. 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 A small, early cancer is a possibility.

## 2017-05-10 NOTE — ANESTHESIA POSTPROCEDURE EVALUATION
Post-Anesthesia Evaluation and Assessment    Patient: Carlos Ocasio MRN: [id number]  SSN: [id number]    YOB: 1940  Age: 69 y.o. Sex: female       Cardiovascular Function/Vital Signs  Visit Vitals    /51 (BP 1 Location: Left arm, BP Patient Position: At rest;Head of bed elevated (Comment degrees))    Pulse 78    Temp 36.8 °C (98.2 °F)    Resp 18    Ht 5' 4\" (1.626 m)    Wt 77.3 kg (170 lb 6.7 oz)    SpO2 96%    BMI 29.25 kg/m2       Patient is status post MAC anesthesia for Procedure(s):  LEFT BREAST DUCTAL EXCISION. Nausea/Vomiting: None    Postoperative hydration reviewed and adequate. Pain:  Pain Scale 1: Numeric (0 - 10) (05/10/17 1222)  Pain Intensity 1: 0 (05/10/17 1222)   Managed    Neurological Status:   Neuro (WDL): Within Defined Limits (05/10/17 1222)  Neuro  Neurologic State: Alert (05/10/17 1222)  LUE Motor Response: Purposeful (05/10/17 1222)  LLE Motor Response: Purposeful (05/10/17 1222)  RUE Motor Response: Purposeful (05/10/17 1222)  RLE Motor Response: Purposeful (05/10/17 1222)   At baseline    Mental Status and Level of Consciousness: Arousable    Pulmonary Status:   O2 Device: Room air (05/10/17 1222)   Adequate oxygenation and airway patent    Complications related to anesthesia: None    Post-anesthesia assessment completed.  No concerns    Signed By: Adi Sapp MD     May 10, 2017

## 2017-05-10 NOTE — INTERVAL H&P NOTE
H&P Update:  Erica Randall was seen and examined. History and physical has been reviewed. The patient has been examined.  There have been no significant clinical changes since the completion of the originally dated History and Physical.    Signed By: Zaida Oneill MD     May 10, 2017 9:50 AM

## 2017-05-10 NOTE — ANESTHESIA PREPROCEDURE EVALUATION
Anesthetic History   No history of anesthetic complications            Review of Systems / Medical History  Patient summary reviewed, nursing notes reviewed and pertinent labs reviewed    Pulmonary  Within defined limits                 Neuro/Psych   Within defined limits           Cardiovascular  Within defined limits            Hyperlipidemia    Exercise tolerance: >4 METS  Comments: metoprolol tartrate (LOPRESSOR)25 mg tablet BID   5/9/2017 at 2200 but due for dose now which I will order        GI/Hepatic/Renal  Within defined limits              Endo/Other  Within defined limits           Other Findings   Comments: AAA repair at [facility], states released from care end of 2007    Lopressor for HR control per patient    H/o GI bleed           Physical Exam    Airway  Mallampati: II  TM Distance: 4 - 6 cm  Neck ROM: normal range of motion   Mouth opening: Normal     Cardiovascular  Regular rate and rhythm,  S1 and S2 normal,  no murmur, click, rub, or gallop  Rhythm: regular  Rate: normal         Dental    Dentition: Full upper dentures     Pulmonary  Breath sounds clear to auscultation               Abdominal  GI exam deferred       Other Findings            Anesthetic Plan    ASA: 3  Anesthesia type: MAC            Anesthetic plan and risks discussed with: Patient

## 2017-05-10 NOTE — OP NOTES
[facility] [place] [address]  [address] [facility] [address]    Name: Ham Dacosta  : 1940    Ductal Excision   Operative Report    Date of Surgery: 5/10/2017   Preoperative Diagnosis: LEFT nipple discharge  Postoperative Diagnosis: same  Surgeon: Dr Claudia Barrett  Anesthesia: MAC  Procedure:  Procedure(s):  LEFT BREAST DUCTAL EXCISION   Indication: LEFT nipple discharge  Procedure in Detail:  The patient was sedated with IV sedation. The Left breast was prepped and draped in the usual sterile manner. 0.5% marcaine with epinephrine was injected for local anesthesia and post operative pain control  Patient had dark brown discharge from the Left nipple. A 2 cm periareolar incision was made along the inferior periareolar border and the ductal tissue was excised. The specimen was sent to Pathology. Hemostasis was controlled with electrocautery. Subcutaneous tissues were reapproximated with 3-0 Vicryl suture. The skin was closed with a running 4-0 Monocryl suture. The wound was dressed with skin glue. The patient was awakened and taken to the recovery room. Sponge, needle and instrument counts were reported to be correct. Findings:  No discharge after the ductal tissue was removed.     Estimated Blood Loss:  10 ml           Specimens:   ID Type Source Tests Collected by Time Destination   1 : left breast ductal excision Preservative Breast  Krysta Whitman MD 5/10/2017 1121 Pathology        Signed By: Krysta Whitman MD

## 2017-05-10 NOTE — IP AVS SNAPSHOT
[address] [address] 1007 [facility] 
[phone] Patient: Magda Quevedo MRN: [id number] LLV:[birth year] You are allergic to the following Allergen Reactions Codeine Nausea Only Morphine Nausea Only Recent Documentation Height Weight BMI OB Status Smoking Status 1.626 m 77.3 kg 29.25 kg/m2 Hysterectomy Never Smoker Emergency Contacts Name Discharge Info Relation Home Work Mobile Ryan Rivera  Other Relative [6] [phone] About your hospitalization You were admitted on:  May 10, 2017 You last received care in the:  [facility] You were discharged on:  May 10, 2017 Unit phone number:  [phone] Why you were hospitalized Your primary diagnosis was:  Not on File Providers Seen During Your Hospitalizations Provider Role Specialty Primary office phone Mera Manzano MD Attending Provider Breast Surgery [phone] Your Primary Care Physician (PCP) Primary Care Physician Office Phone Office Fax Msaon Gudino [phone] Follow-up Information Follow up With Details Comments Contact Info Stephanie Zafar MD   [address] [place] [address] 
[phone] Mera Manzano MD  As needed [address] 
[phone] Current Discharge Medication List  
  
START taking these medications Dose & Instructions Dispensing Information Comments Morning Noon Evening Bedtime HYDROcodone-acetaminophen 5-325 mg per tablet Commonly known as:  [address] Your last dose was: Your next dose is:    
   
   
 Dose:  1-2 Tab Take 1-2 Tabs by mouth every four (4) hours as needed for Pain. Max Daily Amount: 12 Tabs. Quantity:  12 Tab Refills:  0 CONTINUE these medications which have NOT CHANGED Dose & Instructions Dispensing Information Comments Morning Noon Evening Bedtime  
 hydroCHLOROthiazide 12.5 mg capsule Commonly known as:  Richie Treviño Your last dose was: Your next dose is:    
   
   
  Refills:  0  
     
   
   
   
  
 metoprolol tartrate 50 mg tablet Commonly known as:  LOPRESSOR Your last dose was: Your next dose is:    
   
   
 Dose:  25 mg Take 25 mg by mouth two (2) times a day. States she takes 1/2 of a 50mg pill twice daily Refills:  0  
     
   
   
   
  
 metroNIDAZOLE 250 mg tablet Commonly known as:  FLAGYL Your last dose was: Your next dose is:    
   
   
  Refills:  0  
     
   
   
   
  
 simvastatin 20 mg tablet Commonly known as:  ZOCOR Your last dose was: Your next dose is: Take  by mouth nightly. Refills:  0 Where to Get Your Medications Information on where to get these meds will be given to you by the nurse or doctor. ! Ask your nurse or doctor about these medications HYDROcodone-acetaminophen 5-325 mg per tablet Discharge Instructions Discharge Instructions from Dr. Ny Eaton Patient Discharge Instructions Tay De La Paz / [id number] : 1940 Admitted 5/10/2017 Discharged: 5/10/2017 Pain medication can cause constipation. You may need laxatives, Colace, Prune juice or the like in the post operative period. What to do at [facility] Diet:Regular Activity: As tolerated. No driving if taking pain medication. Wound care: Ice pack to wound 20 minutes per hour for 24 hours. May continue ice pack longer if needed. Okay to shower or take a bath. Dressing: The skin glue is waterproof. It is okay to wash normally at this site. If the glue is still present after 10 days you should peel it off. Pain:  You may use over the counter pain medication (Ibuprofen, acetaminophen etc) or Hydrocodone if strong pain medication is needed. Follow up: One week if needed (call the office for an appointment: [phone]). I will call with results in one week. Problems/Questions: Call Talia Costa MD on my cell phone. [phone] How to Care for Your Wound After Its Treated With DERMABOND®  Topical Skin Adhesive DERMABOND® Topical Skin Adhesive (2-octyl cyanoacrylate) is a sterile, liquid skin adhesive that holds wound edges together. The film will usually remain in place for 5-10 days, then naturally fall off your skin. The following will answer some of your questions and provide instructions for proper care for your wound while it is healing: CHECK THE WOUND APPEARANCE 
? Some swelling, redness, and pain are common with all wounds and normally will go away as the wound heals. If swelling, redness, or pain increase or if the wound feels warm to the touch, contact your doctor. Also contact your doctor if the wound edges reopen or separate. REPLACE BANDAGES 
? If your wound is bandaged, keep the bandage dry. ? Replace the bandage daily until the adhesive film has fallen off of if the bandage should become wet, unless otherwise instructed by your doctor. ? When changing the dressing, do not place tape directly over the DERMABOND adhesive film, because removing the tape later may also remove the film. AVOIDING TOPICAL MEDICATIONS ? Do not apply liquid or ointment medications or any other product to your wound while the DERMABOND adhesive film is in place. These may loosen the film before your wound is healed. KEEP WOUND DRY AND PROTECTED ? You may occasionally and briefly wet your wound in the shower if permitted by your surgeon.   Do not soak or scrub your wound, do not swim, and avoid periods of heavy perspiration until the DERMABOND has naturally fallen off. After showering, gently blot your wound dry with a soft towel. If a protective dressing is being used, apply a fresh, dry bandage, being sure to keep the tape off the DERMABOND adhesive film. ? Apply a clean, dry bandage over the wound if necessary to protect it. ? Protect your wound from injury until the skin has had sufficient time to heal 
? Do not scratch, rub, or pick at the DERMABOND adhesive film. This may loosen the film before your wound is healed. ? Protect the wound from prolonged exposure to sunlight or tanning lamps while the film is in place. If you have any questions or concerns about this product, please consult your doctor. ® DERMABOND is a registered [address] [place] [address] of [address] [place] [address] 800 [facility] 2002 DISCHARGE SUMMARY from your Nurse PATIENT INSTRUCTIONS After general anesthesia or intravenous sedation, for 24 hours or while taking prescription Narcotics: · Limit your activities · Do not drive and operate hazardous machinery · Do not make important personal or business decisions · Do  not drink alcoholic beverages · If you have not urinated within 8 hours after discharge, please contact your surgeon on call. Report the following to your surgeon: 
· Excessive pain, swelling, redness or odor of or around the surgical area · Temperature over 100.5 · Nausea and vomiting lasting longer than 4 hours or if unable to take medications · Any signs of decreased circulation or nerve impairment to extremity: change in color, persistent  numbness, tingling, coldness or increase pain · Any questions [address] Breathing deeply and coughing are very important exercises to do after surgery. Deep breathing and coughing open the little air tubes and air sacks in your lungs. You take deep breaths every day. You may not even notice - it is just something you do when you sigh or yawn.   It is a natural exercise you do to keep these air passages open. After surgery, take deep breaths and cough, on purpose. DIRECTIONS: 
· Take 10 to 15 slow deep breaths every hour while awake. · Breathe in deeply, and hold it for 2 seconds. · Exhale slowly through puckered lips, like blowing up a balloon. · After every 4th or 5th deep breath, hug your pillow to your chest or belly and give a hard, deep cough. Yes, it will probably hurt. But doing this exercise is a very important part of healing after surgery. Take your pain medicine to help you do this exercise without too much pain. Coughing and deep breathing help prevent bronchitis and pneumonia after surgery. If you had chest or belly surgery, use a pillow as a \"hug katelynn\" and hold it tightly to your chest or belly when you cough. ANKLE PUMPS Ankle pumps increase the circulation of oxygenated blood to your lower extremities and decrease your risk for circulation problems such as blood clots. They also stretch the muscles, tendons and ligaments in your foot and ankle, and prevent joint contracture in the ankle and foot, especially after surgeries on the legs. It is important to do ankle pump exercises regularly after surgery because immobility increases your risk for developing a blood clot. Your doctor may also have you take an Aspirin for the next few days as well. If your doctor did not ask you to take an Aspirin, consult with him before starting Aspirin therapy on your own. The exercise is quite simple. · Slowly point your foot forward, feeling the muscles on the top of your lower leg stretch, and hold this position for 5 seconds. · Next, pull your foot back toward you as far as possible, stretching the calf muscles, and hold that position for 5 seconds. · Repeat with the other foot. · Perform 10 repetitions every hour while awake for both ankles if possible (down and then up with the foot once is one repetition). You should feel gentle stretching of the muscles in your lower leg when doing this exercise. If you feel pain, or your range of motion is limited, don't push too hard. Only go the limit your joint and muscles will let you go. If you have increasing pain, progressively worsening leg warmth or swelling, STOP the exercise and call your doctor. MEDICATION AND  
SIDE EFFECT GUIDE The [address] [facility] EFFECT GUIDE was provided to the PATIENT AND CARE PROVIDER. Information provided includes instruction about drug purpose and common side effects for the following medications:  
· HYDROCODONE/ACETAMINOPHEN These are general instructions for a healthy lifestyle: *   Please give a list of your current medications to your Primary Care Provider. *   Please update this list whenever your medications are discontinued, doses are changed, or new medications (including over-the-counter products) are added. *   Please carry medication information at all times in case of emergency situations. About Smoking No smoking / No tobacco products Avoid exposure to second hand smoke Surgeon General's Warning:  Quitting smoking now greatly reduces serious risk to your health. Obesity, smoking, and sedentary lifestyle greatly increases your risk for illness and disease. A healthy diet, regular physical exercise & weight monitoring are important for maintaining a healthy lifestyle. Congestive Heart Failure You may be retaining fluid if you have a history of heart failure or if you experience any of the following symptoms:  Weight gain of 3 pounds or more overnight or 5 pounds in a week, increased swelling in your hands or feet or shortness of breath while lying flat in bed. Please call your doctor as soon as you notice any of these symptoms; do not wait until your next office visit. Recognize signs and symptoms of STROKE: 
F -  Face looks uneven A -  Arms unable to move or move evenly S -  Speech slurred or non-existent T -  Time-call 911 as soon as signs and symptoms begin-DO NOT go  
       back to bed or wait to see if you get better-TIME IS BRAIN. Warning Signs of HEART ATTACK Call 911 if you have these symptoms: 
 
? Chest discomfort. Most heart attacks involve discomfort in the center of the chest that lasts more than a few minutes, or that goes away and comes back. It can feel like uncomfortable pressure, squeezing, fullness, or pain. ? Discomfort in other areas of the upper body. Symptoms can include pain or discomfort in one or both arms, the back, neck, jaw, or stomach. ? Shortness of breath with or without chest discomfort. ? Other signs may include breaking out in a cold sweat, nausea, or lightheadedness. Don't wait more than five minutes to call [address]! Fast action can save your life. Calling 911 is almost always the fastest way to get lifesaving treatment. Emergency Medical Services staff can begin treatment when they arrive  up to an hour sooner than if someone gets to the hospital by car. The discharge information has been reviewed with the patient and caregiver. Any questions and concerns from the patient and caregiver have been addressed. The patient and caregiver verbalized understanding. Other information in your discharge envelope: 
[x]     PRESCRIPTIONS []     PHYSICAL THERAPY PRESCRIPTION 
[]     APPOINTMENT CARDS []     Regional Anesthesia Pamphlet for block or block with On-Q Catheter from   Anesthesia Service []     Medical device information sheets/pamphlets from their   
[]     School/work excuse note. []     /parent work excuse note. The following personal items collected during your admission are returned to you:  
Dental Appliance: Dental Appliances: Uppers Vision:   
Hearing Aid:   
Jewelry: Jewelry: None Clothing: Clothing: Pants, Undergarments, Shirt, Footwear Other Valuables: Other Valuables: None Valuables sent to safe: Personal Items Sent to Safe: n/a Discharge Orders None Introducing [facility]! [employer] introduces [employer] patient portal. Now you can access parts of your medical record, email your doctor's office, and request medication refills online. 1. In your internet browser, go to [email or web address] 2. Click on the First Time User? Click Here link in the Sign In box. You will see the New Member Sign Up page. 3. Enter your [employer] Access Code exactly as it appears below. You will not need to use this code after youve completed the sign-up process. If you do not sign up before the expiration date, you must request a new code. · [employer] Access Code: [email or web address] Expires: 7/31/2017  1:57 PM 
 
4. Enter the last four digits of your Social Security Number (xxxx) and Date of Birth (mm/dd/yyyy) as indicated and click Submit. You will be taken to the next sign-up page. 5. Create a [employer] ID. This will be your [employer] login ID and cannot be changed, so think of one that is secure and easy to remember. 6. Create a [employer] password. You can change your password at any time. 7. Enter your Password Reset Question and Answer. This can be used at a later time if you forget your password. 8. Enter your e-mail address. You will receive e-mail notification when new information is available in [address] 9. Click Sign Up. You can now view and download portions of your medical record. 10. Click the Download Summary menu link to download a portable copy of your medical information. If you have questions, please visit the Frequently Asked Questions section of the [employer] website. Remember, [employer] is NOT to be used for urgent needs. For medical emergencies, dial 911. Now available from your iPhone and Android! General Information Please provide this summary of care documentation to your next provider. Patient Signature:  ____________________________________________________________ Date:  ____________________________________________________________  
  
Bela Cat Provider Signature:  ____________________________________________________________ Date:  ____________________________________________________________

## 2017-05-11 ENCOUNTER — TELEPHONE (OUTPATIENT)
Dept: SURGERY | Age: 77
End: 2017-05-11

## 2017-05-11 NOTE — TELEPHONE ENCOUNTER
Left breast, ductal excision       Intraductal papilloma    Spoke with patient. She will follow up next week if she still has pain.

## 2019-01-22 PROBLEM — N13.30 HYDRONEPHROSIS: Status: ACTIVE | Noted: 2019-01-22

## 2019-01-22 PROBLEM — I71.40 AAA (ABDOMINAL AORTIC ANEURYSM): Status: ACTIVE | Noted: 2019-01-22

## 2019-01-22 PROBLEM — I77.6 AORTITIS (HCC): Status: ACTIVE | Noted: 2018-07-15

## 2019-01-22 PROBLEM — I10 HYPERTENSION: Status: ACTIVE | Noted: 2019-01-22

## 2019-01-22 PROBLEM — G89.29 CHRONIC ABDOMINAL PAIN: Status: ACTIVE | Noted: 2019-01-22

## 2019-01-22 PROBLEM — R10.9 CHRONIC ABDOMINAL PAIN: Status: ACTIVE | Noted: 2019-01-22

## 2019-01-22 PROBLEM — T82.7XXA VASCULAR GRAFT INFECTION (HCC): Status: ACTIVE | Noted: 2018-07-15

## 2019-02-18 PROBLEM — R79.89 ELEVATED LFTS: Status: ACTIVE | Noted: 2019-01-30

## 2020-12-23 ENCOUNTER — HOSPITAL ENCOUNTER (INPATIENT)
Age: 80
LOS: 8 days | Discharge: NURSING, REHAB OR LONG-TERM CARE | DRG: 884 | End: 2021-01-05
Attending: EMERGENCY MEDICINE | Admitting: HOSPITALIST
Payer: MEDICARE

## 2020-12-23 ENCOUNTER — APPOINTMENT (OUTPATIENT)
Dept: GENERAL RADIOLOGY | Age: 80
DRG: 884 | End: 2020-12-23
Attending: EMERGENCY MEDICINE
Payer: MEDICARE

## 2020-12-23 ENCOUNTER — APPOINTMENT (OUTPATIENT)
Dept: CT IMAGING | Age: 80
DRG: 884 | End: 2020-12-23
Attending: EMERGENCY MEDICINE
Payer: MEDICARE

## 2020-12-23 DIAGNOSIS — E86.0 DEHYDRATION: Primary | ICD-10-CM

## 2020-12-23 LAB
ALBUMIN SERPL-MCNC: 4 G/DL (ref 3.5–5)
ALBUMIN/GLOB SERPL: 0.9 {RATIO} (ref 1.1–2.2)
ALP SERPL-CCNC: 81 U/L (ref 45–117)
ALT SERPL-CCNC: 15 U/L (ref 12–78)
ANION GAP SERPL CALC-SCNC: 13 MMOL/L (ref 5–15)
APPEARANCE UR: CLEAR
AST SERPL W P-5'-P-CCNC: 39 U/L (ref 15–37)
ATRIAL RATE: 84 BPM
BACTERIA URNS QL MICRO: ABNORMAL /HPF
BASOPHILS # BLD: 0.1 K/UL (ref 0–0.2)
BASOPHILS NFR BLD: 1 % (ref 0–2.5)
BILIRUB SERPL-MCNC: 1 MG/DL (ref 0.2–1)
BILIRUB UR QL CFM: POSITIVE
BILIRUB UR QL: ABNORMAL
BUN SERPL-MCNC: 52 MG/DL (ref 6–20)
BUN/CREAT SERPL: 30 (ref 12–20)
CA-I BLD-MCNC: 10.9 MG/DL (ref 8.5–10.1)
CALCULATED P AXIS, ECG09: 0 DEGREES
CALCULATED R AXIS, ECG10: 46 DEGREES
CALCULATED T AXIS, ECG11: 65 DEGREES
CHLORIDE SERPL-SCNC: 113 MMOL/L (ref 97–108)
CK SERPL-CCNC: 78 U/L (ref 26–192)
CO2 SERPL-SCNC: 22 MMOL/L (ref 21–32)
COLOR UR: ABNORMAL
CREAT SERPL-MCNC: 1.72 MG/DL (ref 0.55–1.02)
DIAGNOSIS, 93000: NORMAL
EOSINOPHIL # BLD: 0 K/UL (ref 0–0.7)
EOSINOPHIL NFR BLD: 0 % (ref 0.9–2.9)
ERYTHROCYTE [DISTWIDTH] IN BLOOD BY AUTOMATED COUNT: 16.8 % (ref 11.5–14.5)
GLOBULIN SER CALC-MCNC: 4.5 G/DL (ref 2–4)
GLUCOSE SERPL-MCNC: 111 MG/DL (ref 65–100)
GLUCOSE UR STRIP.AUTO-MCNC: NEGATIVE MG/DL
HCT VFR BLD AUTO: 53.5 % (ref 36–46)
HGB BLD-MCNC: 17.4 G/DL (ref 13.5–17.5)
HGB UR QL STRIP: ABNORMAL
INR PPP: 1.2 (ref 0.9–1.1)
KETONES UR QL STRIP.AUTO: ABNORMAL MG/DL
LACTATE SERPL-SCNC: 1.7 MMOL/L (ref 0.4–2)
LACTATE SERPL-SCNC: 2.2 MMOL/L (ref 0.4–2)
LACTATE SERPL-SCNC: 3.3 MMOL/L (ref 0.4–2)
LEUKOCYTE ESTERASE UR QL STRIP.AUTO: NEGATIVE
LYMPHOCYTES # BLD: 1.1 K/UL (ref 1–4.8)
LYMPHOCYTES NFR BLD: 9 % (ref 20.5–51.1)
MAGNESIUM SERPL-MCNC: 2.4 MG/DL (ref 1.6–2.4)
MCH RBC QN AUTO: 28.4 PG (ref 31–34)
MCHC RBC AUTO-ENTMCNC: 32.6 G/DL (ref 31–36)
MCV RBC AUTO: 87 FL (ref 80–100)
MONOCYTES # BLD: 0.8 K/UL (ref 0.2–2.4)
MONOCYTES NFR BLD: 7 % (ref 1.7–9.3)
NEUTS SEG # BLD: 10.2 K/UL (ref 1.8–7.7)
NEUTS SEG NFR BLD: 83 % (ref 42–75)
NITRITE UR QL STRIP.AUTO: NEGATIVE
NRBC # BLD: 0.02 K/UL
NRBC BLD-RTO: 10 PER 100 WBC
P-R INTERVAL, ECG05: 154 MS
PH UR STRIP: 5.5 [PH] (ref 5–8)
PLATELET # BLD AUTO: 160 K/UL
PMV BLD AUTO: 9.5 FL (ref 6.5–11.5)
POTASSIUM SERPL-SCNC: 3.7 MMOL/L (ref 3.5–5.1)
PROT SERPL-MCNC: 8.5 G/DL (ref 6.4–8.2)
PROT UR STRIP-MCNC: 30 MG/DL
PROTHROMBIN TIME: 12 SEC (ref 9–11.1)
Q-T INTERVAL, ECG07: 400 MS
QRS DURATION, ECG06: 89 MS
QTC CALCULATION (BEZET), ECG08: 476 MS
RBC # BLD AUTO: 6.15 M/UL (ref 4.5–5.9)
RBC #/AREA URNS HPF: ABNORMAL /HPF (ref 0–3)
SODIUM SERPL-SCNC: 148 MMOL/L (ref 136–145)
SP GR UR REFRACTOMETRY: 1.03
TROPONIN I SERPL-MCNC: <0.05 NG/ML
UROBILINOGEN UR QL STRIP.AUTO: 1 EU/DL (ref 0.2–1)
VENTRICULAR RATE, ECG03: 85 BPM
WBC # BLD AUTO: 12.2 K/UL (ref 4.4–11.3)
WBC URNS QL MICRO: ABNORMAL /HPF (ref 0–5)

## 2020-12-23 PROCEDURE — 83605 ASSAY OF LACTIC ACID: CPT

## 2020-12-23 PROCEDURE — 85610 PROTHROMBIN TIME: CPT

## 2020-12-23 PROCEDURE — 36415 COLL VENOUS BLD VENIPUNCTURE: CPT

## 2020-12-23 PROCEDURE — 93005 ELECTROCARDIOGRAM TRACING: CPT

## 2020-12-23 PROCEDURE — 74011250637 HC RX REV CODE- 250/637: Performed by: NURSE PRACTITIONER

## 2020-12-23 PROCEDURE — 81001 URINALYSIS AUTO W/SCOPE: CPT

## 2020-12-23 PROCEDURE — 99218 HC RM OBSERVATION: CPT

## 2020-12-23 PROCEDURE — 87186 SC STD MICRODIL/AGAR DIL: CPT

## 2020-12-23 PROCEDURE — 99285 EMERGENCY DEPT VISIT HI MDM: CPT

## 2020-12-23 PROCEDURE — 71045 X-RAY EXAM CHEST 1 VIEW: CPT

## 2020-12-23 PROCEDURE — 87077 CULTURE AEROBIC IDENTIFY: CPT

## 2020-12-23 PROCEDURE — 74011250636 HC RX REV CODE- 250/636: Performed by: EMERGENCY MEDICINE

## 2020-12-23 PROCEDURE — 96361 HYDRATE IV INFUSION ADD-ON: CPT

## 2020-12-23 PROCEDURE — 96360 HYDRATION IV INFUSION INIT: CPT

## 2020-12-23 PROCEDURE — 70450 CT HEAD/BRAIN W/O DYE: CPT

## 2020-12-23 PROCEDURE — 82550 ASSAY OF CK (CPK): CPT

## 2020-12-23 PROCEDURE — 74011000258 HC RX REV CODE- 258: Performed by: NURSE PRACTITIONER

## 2020-12-23 PROCEDURE — 80053 COMPREHEN METABOLIC PANEL: CPT

## 2020-12-23 PROCEDURE — 74011250636 HC RX REV CODE- 250/636: Performed by: NURSE PRACTITIONER

## 2020-12-23 PROCEDURE — 83735 ASSAY OF MAGNESIUM: CPT

## 2020-12-23 PROCEDURE — 85025 COMPLETE CBC W/AUTO DIFF WBC: CPT

## 2020-12-23 PROCEDURE — 96375 TX/PRO/DX INJ NEW DRUG ADDON: CPT

## 2020-12-23 PROCEDURE — 74011250636 HC RX REV CODE- 250/636: Performed by: HOSPITALIST

## 2020-12-23 PROCEDURE — 84484 ASSAY OF TROPONIN QUANT: CPT

## 2020-12-23 PROCEDURE — 87086 URINE CULTURE/COLONY COUNT: CPT

## 2020-12-23 RX ORDER — METOPROLOL TARTRATE 25 MG/1
25 TABLET, FILM COATED ORAL 2 TIMES DAILY
Status: DISCONTINUED | OUTPATIENT
Start: 2020-12-23 | End: 2020-12-23

## 2020-12-23 RX ORDER — ACETAMINOPHEN 325 MG/1
650 TABLET ORAL
Status: DISCONTINUED | OUTPATIENT
Start: 2020-12-23 | End: 2021-01-05 | Stop reason: HOSPADM

## 2020-12-23 RX ORDER — METOPROLOL SUCCINATE 50 MG/1
50 TABLET, EXTENDED RELEASE ORAL DAILY
Status: DISCONTINUED | OUTPATIENT
Start: 2020-12-24 | End: 2021-01-05 | Stop reason: HOSPADM

## 2020-12-23 RX ORDER — METOPROLOL SUCCINATE 50 MG/1
50 TABLET, EXTENDED RELEASE ORAL DAILY
Status: ON HOLD | COMMUNITY
End: 2021-01-05 | Stop reason: SDUPTHER

## 2020-12-23 RX ORDER — TAMSULOSIN HYDROCHLORIDE 0.4 MG/1
0.4 CAPSULE ORAL
Status: ON HOLD | COMMUNITY
End: 2021-01-05 | Stop reason: SDUPTHER

## 2020-12-23 RX ORDER — SIMVASTATIN 20 MG/1
20 TABLET, FILM COATED ORAL
COMMUNITY
End: 2021-01-05

## 2020-12-23 RX ORDER — SODIUM CHLORIDE 450 MG/100ML
100 INJECTION, SOLUTION INTRAVENOUS CONTINUOUS
Status: DISCONTINUED | OUTPATIENT
Start: 2020-12-23 | End: 2020-12-29

## 2020-12-23 RX ORDER — ONDANSETRON 2 MG/ML
4 INJECTION INTRAMUSCULAR; INTRAVENOUS
Status: DISCONTINUED | OUTPATIENT
Start: 2020-12-23 | End: 2021-01-05 | Stop reason: HOSPADM

## 2020-12-23 RX ORDER — OXYBUTYNIN CHLORIDE 5 MG/1
5 TABLET ORAL 3 TIMES DAILY
Status: DISCONTINUED | OUTPATIENT
Start: 2020-12-23 | End: 2021-01-05 | Stop reason: HOSPADM

## 2020-12-23 RX ORDER — SODIUM CHLORIDE 9 MG/ML
100 INJECTION, SOLUTION INTRAVENOUS CONTINUOUS
Status: DISCONTINUED | OUTPATIENT
Start: 2020-12-23 | End: 2020-12-23

## 2020-12-23 RX ORDER — AMLODIPINE BESYLATE 2.5 MG/1
2.5 TABLET ORAL
Status: DISCONTINUED | OUTPATIENT
Start: 2020-12-23 | End: 2021-01-05 | Stop reason: HOSPADM

## 2020-12-23 RX ORDER — ATORVASTATIN CALCIUM 20 MG/1
20 TABLET, FILM COATED ORAL
Status: DISCONTINUED | OUTPATIENT
Start: 2020-12-23 | End: 2021-01-05 | Stop reason: HOSPADM

## 2020-12-23 RX ORDER — PANTOPRAZOLE SODIUM 40 MG/1
40 TABLET, DELAYED RELEASE ORAL
Status: DISCONTINUED | OUTPATIENT
Start: 2020-12-24 | End: 2021-01-05 | Stop reason: HOSPADM

## 2020-12-23 RX ORDER — TAMSULOSIN HYDROCHLORIDE 0.4 MG/1
0.4 CAPSULE ORAL DAILY
Status: DISCONTINUED | OUTPATIENT
Start: 2020-12-24 | End: 2021-01-05 | Stop reason: HOSPADM

## 2020-12-23 RX ORDER — POTASSIUM CHLORIDE AND SODIUM CHLORIDE 450; 150 MG/100ML; MG/100ML
INJECTION, SOLUTION INTRAVENOUS CONTINUOUS
Status: DISCONTINUED | OUTPATIENT
Start: 2020-12-23 | End: 2020-12-23

## 2020-12-23 RX ADMIN — AMLODIPINE BESYLATE 2.5 MG: 2.5 TABLET ORAL at 22:07

## 2020-12-23 RX ADMIN — SODIUM CHLORIDE 100 ML/HR: 4.5 INJECTION, SOLUTION INTRAVENOUS at 17:27

## 2020-12-23 RX ADMIN — SODIUM CHLORIDE 1000 ML: 9 INJECTION, SOLUTION INTRAVENOUS at 14:37

## 2020-12-23 RX ADMIN — OXYBUTYNIN CHLORIDE 5 MG: 5 TABLET ORAL at 22:07

## 2020-12-23 RX ADMIN — CEFTRIAXONE SODIUM 1 G: 1 INJECTION, POWDER, FOR SOLUTION INTRAMUSCULAR; INTRAVENOUS at 17:27

## 2020-12-23 RX ADMIN — METOPROLOL TARTRATE 25 MG: 25 TABLET, FILM COATED ORAL at 17:27

## 2020-12-23 RX ADMIN — OXYBUTYNIN CHLORIDE 5 MG: 5 TABLET ORAL at 17:28

## 2020-12-23 RX ADMIN — ATORVASTATIN CALCIUM 20 MG: 20 TABLET, FILM COATED ORAL at 22:07

## 2020-12-23 RX ADMIN — POTASSIUM CHLORIDE AND SODIUM CHLORIDE: 450; 150 INJECTION, SOLUTION INTRAVENOUS at 15:31

## 2020-12-23 RX ADMIN — SODIUM CHLORIDE 500 ML: 9 INJECTION, SOLUTION INTRAVENOUS at 12:55

## 2020-12-23 NOTE — ED PROVIDER NOTES
EMERGENCY DEPARTMENT HISTORY AND PHYSICAL EXAM      Date: 12/23/2020  Patient Name: Kiara Steward      History of Presenting Illness     Chief Complaint   Patient presents with    Fall       History Provided By: Caregiver    HPI: Kiara Steward, 79 y.o. female with a past medical history significant Right leg weakness and ambulates with a walker presents to the ED with cc of possible fall at home as reported by caregiver and EMS; on EMS arrival patient was sitting; patient denying any pain at time of exam; patient unable to give details about if she had fallen; patient oriented to self and place but not time    There are no other complaints, changes, or physical findings at this time. PCP: Baltazar Farrar MD    Current Outpatient Medications   Medication Sig Dispense Refill    mirabegron ER (MYRBETRIQ) 50 mg ER tablet Take 1 Tab by mouth daily. 90 Tab 3    fluconazole (DIFLUCAN) 200 mg tablet Take 200 mg by mouth daily. FDA advises cautious prescribing of oral fluconazole in pregnancy.  metoprolol tartrate (LOPRESSOR) 25 mg tablet Take  by mouth two (2) times a day.  amoxicillin-clavulanate (AUGMENTIN) 500-125 mg per tablet Take  by mouth two (2) times a day.  traMADol (ULTRAM) 50 mg tablet Take 50 mg by mouth as needed.  pantoprazole (PROTONIX) 40 mg tablet Take 40 mg by mouth daily.  aspirin delayed-release 81 mg tablet 81 mg.      hydroCHLOROthiazide (MICROZIDE) 12.5 mg capsule Take 25 mg by mouth daily.  amLODIPine (NORVASC) 2.5 mg tablet Take 2.5 mg by mouth nightly.  diphenhydrAMINE (BENADRYL) 25 mg capsule 25 mg as needed.  metroNIDAZOLE (FLAGYL) 250 mg tablet 250 mg at bedtime as directed for dose pack.          Past History     Past Medical History:  Past Medical History:   Diagnosis Date    Abdominal aortic aneurysm ([address]     Adverse effect of anesthesia 2017    hypoxia  with contual dementia    Adverse effect of anesthesia     BP flucuates    [facility]itis ([facility]     Atrophic kidney     Chronic abdominal pain     Dementia (HCC)     Hydronephrosis     Hypercholesterolemia     Hypertension     pt states she is unaware of HTN-states she \"takes metoprolol to regulate HR\"    Ill-defined condition     one episode of Bell's Palsy    Ill-defined condition late 2015/early 2016    GI bleed-required transfusions, transferred from [facility] to [address] thinks she may have had surgery, possible sm bowel resection    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[facility]     subsequent encounter    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 (taking ATBX)    Non-nicotine vapor product user     Dimentia,   some confusions    OAB (overactive bladder)     Retained ureteral stent     Right leg weakness     Solitary kidney     Unsteady gait     Ureteral stone     Uses walker     sometimes    UTI (urinary tract infection)        Past Surgical History:  Past Surgical History:   Procedure Laterality Date    BREAST SURGERY PROCEDURE UNLISTED  1970's    two surgeries to remove benign tumors from each breast    [facility]      states multiple surgeries to remove cysts from each breast    HX AAA REPAIR N/A 2007    HX AAA REPAIR N/A 2016    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     HX APPENDECTOMY      in 9th grade    HX BREAST LUMPECTOMY Left 5/10/2017    LEFT BREAST DUCTAL EXCISION performed by Adelaide Olmos MD at [address] HX CATARACT REMOVAL Bilateral 2015    HX GI N/A 07/19/2018    EGD with U/S    HX HERNIA REPAIR  2010    abd incisional hernia repair    HX HYSTERECTOMY      and one ovary removed    HX ORTHOPAEDIC Left 1992    foot surgery for fx heel    HX SMALL BOWEL RESECTION N/A Unknown    HX TONSILLECTOMY      HX UROLOGICAL Left 09/06/2018    Cystourethroscopy w/ indwelling ureteral stent insertion -- Dr Jean-Pierre Benjamin    HX UROLOGICAL  03/12/2019    Cysto, bilat RPG, left URS, holmium laser litho, stone extract, left JJ stent exchange.   [facility].  IR STENT PLACEMENT  2018    ureteral    VASCULAR SURGERY PROCEDURE UNLIST  2007    [facility]       Family History:  Family History   Problem Relation Age of Onset    Heart Disease Mother     Lung Disease Father        Social History:  Social History     Tobacco Use    Smoking status: Former Smoker     Packs/day: 0.75     Years: 48.00     Pack years: 36.00     Quit date: 3/6/2007     Years since quittin.8    Smokeless tobacco: Never Used   Substance Use Topics    Alcohol use: No    Drug use: No       Allergies: Allergies   Allergen Reactions    Codeine Nausea Only    Codeine Other (comments)    Ibuprofen Hives    Morphine Nausea Only    Morphine Other (comments)         Review of Systems     Review of Systems   Constitutional: Negative for chills and fever. HENT: Negative for rhinorrhea and sore throat. Eyes: Negative for discharge and visual disturbance. Respiratory: Negative for cough and shortness of breath. Cardiovascular: Negative for chest pain and leg swelling. Gastrointestinal: Negative for abdominal pain and vomiting. Endocrine: Negative for polydipsia and polyuria. Genitourinary: Negative for dysuria and urgency. Musculoskeletal: Positive for back pain. Negative for neck pain. Skin: Negative for color change and pallor. Neurological: Negative for weakness and numbness. Psychiatric/Behavioral: Negative. Physical Exam     Physical Exam  Vitals signs and nursing note reviewed. Constitutional:       Appearance: Normal appearance. HENT:      Head: Normocephalic and atraumatic. Nose: Nose normal.      Mouth/Throat:      Mouth: Mucous membranes are dry. Eyes:      Extraocular Movements: Extraocular movements intact. Conjunctiva/sclera: Conjunctivae normal.      Pupils: Pupils are equal, round, and reactive to light. Neck:      Musculoskeletal: Normal range of motion and neck supple.    Cardiovascular:      Rate and Rhythm: Normal rate and regular rhythm. Pulses: Normal pulses. Heart sounds: Normal heart sounds. Pulmonary:      Effort: Pulmonary effort is normal.      Breath sounds: Normal breath sounds. Abdominal:      General: Bowel sounds are normal.      Palpations: Abdomen is soft. Musculoskeletal: Normal range of motion. Skin:     General: Skin is warm and dry. Capillary Refill: Capillary refill takes less than 2 seconds. Neurological:      Mental Status: She is alert. Mental status is at baseline. She is disoriented. Psychiatric:         Mood and Affect: Mood normal.         Behavior: Behavior normal.         Lab and Diagnostic Study Results     Labs -     Recent Results (from the past 12 hour(s))   CBC WITH AUTOMATED DIFF    Collection Time: 12/23/20 12:50 PM   Result Value Ref Range    WBC 12.2 (H) 4.4 - 11.3 K/uL    RBC 6.15 (H) 4.50 - 5.90 M/uL    HGB 17.4 13.5 - 17.5 g/dL    HCT 53.5 (H) 36 - 46 %    MCV 87.0 80 - 100 FL    MCH 28.4 (L) 31 - 34 PG    MCHC 32.6 31.0 - 36.0 g/dL    RDW 16.8 (H) 11.5 - 14.5 %    PLATELET 074 K/uL    MPV 9.5 6.5 - 11.5 FL    NRBC 10.0  WBC    ABSOLUTE NRBC 0.02 K/uL    NEUTROPHILS 83 (H) 42 - 75 %    LYMPHOCYTES 9 (L) 20.5 - 51.1 %    MONOCYTES 7 1.7 - 9.3 %    EOSINOPHILS 0 (L) 0.9 - 2.9 %    BASOPHILS 1 0.0 - 2.5 %    ABS. NEUTROPHILS 10.2 (H) 1.8 - 7.7 K/UL    ABS. LYMPHOCYTES 1.1 1.0 - 4.8 K/UL    ABS. MONOCYTES 0.8 0.2 - 2.4 K/UL    ABS. EOSINOPHILS 0.0 0.0 - 0.7 K/UL    ABS.  BASOPHILS 0.1 0.0 - 0.2 K/UL   PROTHROMBIN TIME + INR    Collection Time: 12/23/20 12:50 PM   Result Value Ref Range    Prothrombin time 12.0 (H) 9.0 - 11.1 sec    INR 1.2 (H) 0.9 - 1.1     METABOLIC PANEL, COMPREHENSIVE    Collection Time: 12/23/20 12:50 PM   Result Value Ref Range    Sodium 148 (H) 136 - 145 mmol/L    Potassium 3.7 3.5 - 5.1 mmol/L    Chloride 113 (H) 97 - 108 mmol/L    CO2 22 21 - 32 mmol/L    Anion gap 13 5 - 15 mmol/L    Glucose 111 (H) 65 - 100 mg/dL BUN 52 (H) 6 - 20 mg/dL    Creatinine 1.72 (H) 0.55 - 1.02 mg/dL    BUN/Creatinine ratio 30 (H) 12 - 20      GFR est AA 35 (L) >60 ml/min/1.73m2    GFR est non-AA 29 (L) >60 ml/min/1.73m2    Calcium 10.9 (H) 8.5 - 10.1 mg/dL    Bilirubin, total 1.0 0.2 - 1.0 mg/dL    AST (SGOT) 39 (H) 15 - 37 U/L    ALT (SGPT) 15 12 - 78 U/L    Alk. phosphatase 81 45 - 117 U/L    Protein, total 8.5 (H) 6.4 - 8.2 g/dL    Albumin 4.0 3.5 - 5.0 g/dL    Globulin 4.5 (H) 2.0 - 4.0 g/dL    A-G Ratio 0.9 (L) 1.1 - 2.2     TROPONIN I    Collection Time: 12/23/20 12:50 PM   Result Value Ref Range    Troponin-I, Qt. <0.05 <0.05 ng/mL   MAGNESIUM    Collection Time: 12/23/20 12:50 PM   Result Value Ref Range    Magnesium 2.4 1.6 - 2.4 mg/dL   CK    Collection Time: 12/23/20 12:50 PM   Result Value Ref Range    CK 78 26 - 192 U/L   LACTIC ACID    Collection Time: 12/23/20 12:50 PM   Result Value Ref Range    Lactic acid 3.3 (HH) 0.4 - 2.0 mmol/L   URINALYSIS W/ RFLX MICROSCOPIC    Collection Time: 12/23/20  1:30 PM   Result Value Ref Range    Color Yellow/Straw      Appearance Clear Clear      Specific gravity 1.032      pH (UA) 5.5 5.0 - 8.0      Protein 30 (A) Negative mg/dL    Glucose Negative Negative mg/dL    Ketone Trace (A) Negative mg/dL    Bilirubin Moderate (A) Negative      Blood Trace (A) Negative      Urobilinogen 1.0 0.2 - 1.0 EU/dL    Nitrites Negative Negative      Leukocyte Esterase Negative Negative      Bilirubin UA, confirm Positive     URINE MICROSCOPIC    Collection Time: 12/23/20  1:30 PM   Result Value Ref Range    WBC 5-10 0 - 5 /hpf    RBC 10-20 0 - 3 /hpf    Bacteria 4+ (A) Negative /hpf       Radiologic Studies -   [unfilled]  CT Results  (Last 48 hours)               12/23/20 1348  CT HEAD WO CONT Final result    Impression:  IMPRESSION:    No acute intracranial finding. Narrative:  EXAM: CT HEAD WO CONT       INDICATION: FALL       COMPARISON: None.        Technique:   Unenhanced CT of the head was performed using 5 mm images. Brain and bone   windows were generated. Coronal and sagittal reformats. CT dose reduction was achieved through use of a standardized protocol tailored   for this examination and automatic exposure control for dose modulation. FINDINGS:    Prominent ventricles and sulci. Moderate white matter disease. There is no   intracranial hemorrhage, extra-axial collection, or mass effect. The basilar   cisterns are open. No CT evidence of acute infarct. No acute skull fracture. The visualized portions of the paranasal sinuses and   mastoid air cells are clear. Bilateral cataract surgeries. CXR Results  (Last 48 hours)               12/23/20 1349  XR CHEST PORT Final result    Impression:  IMPRESSION: No acute finding. Narrative:  EXAM: XR CHEST PORT       INDICATION: WEAKNESS       COMPARISON: None. FINDINGS:    Unremarkable cardiomediastinal contours. No focal airspace consolidation. No   pneumothorax or pleural effusion. No acute fracture or dislocation. Medical Decision Making and ED Course   - I am the first and primary provider for this patient AND AM THE PRIMARY PROVIDER OF RECORD. - I reviewed the vital signs, available nursing notes, past medical history, past surgical history, family history and social history. - Initial assessment performed. The patients presenting problems have been discussed, and the staff are in agreement with the care plan formulated and outlined with them. I have encouraged them to ask questions as they arise throughout their visit. Vital Signs-Reviewed the patient's vital signs.     Patient Vitals for the past 12 hrs:   Temp Pulse Resp BP SpO2   12/23/20 1218     96 %   12/23/20 1217 97.4 °F (36.3 °C)       12/23/20 1209  86 21 134/86 95 %         Records Reviewed: Nursing Notes    The patient presents with dizziness with a differential diagnosis of  dizziness/vertigo, generalized weakness, labrynthitis and vasovagal episode    ED Course:       ED Course as of Dec 23 1438   Wed Dec 23, 2020   1224 Patient denies any pain and she is unable to tell me what has happened to her she is not oriented to time    [SB]   1228 EKG atrial paced UT interval 154 QT interval 476 normal P axis ST depressions in V6 and lead III    [SB]      ED Course User Index  [SB] Margarita Garcia MD         Provider Notes (Medical Decision Making): MDM           Consultations:       Consultations: - NONE        Procedures and Critical Care       Performed by: Carlos Sapp MD  PROCEDURES:  Procedures               Carlos Sapp MD        Disposition     Disposition: Condition stable and improved  Patient admitted to observation unit    Admitted    Remove if not discharged  DISCHARGE PLAN:  1. Current Discharge Medication List      CONTINUE these medications which have NOT CHANGED    Details   mirabegron ER (MYRBETRIQ) 50 mg ER tablet Take 1 Tab by mouth daily. Qty: 90 Tab, Refills: 3      fluconazole (DIFLUCAN) 200 mg tablet Take 200 mg by mouth daily. FDA advises cautious prescribing of oral fluconazole in pregnancy. metoprolol tartrate (LOPRESSOR) 25 mg tablet Take  by mouth two (2) times a day. amoxicillin-clavulanate (AUGMENTIN) 500-125 mg per tablet Take  by mouth two (2) times a day. traMADol (ULTRAM) 50 mg tablet Take 50 mg by mouth as needed. pantoprazole (PROTONIX) 40 mg tablet Take 40 mg by mouth daily. aspirin delayed-release 81 mg tablet 81 mg.      hydroCHLOROthiazide (MICROZIDE) 12.5 mg capsule Take 25 mg by mouth daily. amLODIPine (NORVASC) 2.5 mg tablet Take 2.5 mg by mouth nightly. diphenhydrAMINE (BENADRYL) 25 mg capsule 25 mg as needed. metroNIDAZOLE (FLAGYL) 250 mg tablet 250 mg at bedtime as directed for dose pack. 2.   Follow-up Information    None       3. Return to ED if worse   4.    Current Discharge Medication List          Diagnosis     Clinical Impression: No diagnosis found. Attestations:    Verner Kanner, MD    Please note that this dictation was completed with [employer], the computer voice recognition software. Quite often unanticipated grammatical, syntax, homophones, and other interpretive errors are inadvertently transcribed by the computer software. Please disregard these errors. Please excuse any errors that have escaped final proofreading. Thank you.

## 2020-12-23 NOTE — ED TRIAGE NOTES
Pt was found by friend on floor from a fall that could have happened between 12-24hrs. Pt denies pain at this time, per EMS pt reported lower back pain at the scene. Pt is AOx1.

## 2020-12-23 NOTE — ED NOTES
Attempted to give report to floor and was informed that the accepting nurse will call back when ready.

## 2020-12-23 NOTE — ROUTINE PROCESS
TRANSFER - IN REPORT: 
 
Verbal report received from [id number] [place] [address] [address] r.n.name) on Ludivina Essie  being received from ed(unit) for routine progression of care Report consisted of patients Situation, Background, Assessment and  
Recommendations(SBAR). Information from the following report(s) SBAR was reviewed with the receiving nurse. Opportunity for questions and clarification was provided. Assessment completed upon patients arrival to unit and care assumed.

## 2020-12-23 NOTE — H&P
Hospitalist           History and Physical Note: 12/23/2020      Subjective:     Patient is a 80-year-old female with history of hypertension, urinary incontinence and GERD who was brought into the emergency department this afternoon by EMS after the patient was found on the floor at home by her neighbor. Patient is not a very poor historian and I could not get much information from her. Unknown how long she was on the floor for. Attempted to call her power of  (Dennister at [phone]) but could not get a hold of her. Labs on admission showed WBC is elevated at 12.2, urinalysis positive for bacteria, and creatinine elevated at 1.72 and she was also noted to have lactic acidosis with lactic acid level of 3.3. Internal medicine invited to admit for further management. 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 7XXA    Hydronephrosis N13.30    Elevated LFTs R79.89    Dehydration E86.0         Medications reviewed  Current Facility-Administered Medications   Medication Dose Route Frequency    0.9% sodium chloride infusion  100 mL/hr IntraVENous CONTINUOUS    cefTRIAXone (ROCEPHIN) 1 g in 0.9% sodium chloride (MBP/ADV) 50 mL MBP  1 g IntraVENous Q24H    amLODIPine (NORVASC) tablet 2.5 mg  2.5 mg Oral QHS    metoprolol tartrate (LOPRESSOR) tablet 25 mg  25 mg Oral BID    [START ON 12/24/2020] mirabegron ER (MYRBETRIQ) tablet 50 mg  50 mg Oral DAILY    [START ON 12/24/2020] pantoprazole (PROTONIX) tablet 40 mg  40 mg Oral ACB       Review of Systems:   Review of Systems   Constitutional: Negative for chills, diaphoresis, fever, malaise/fatigue and weight loss. HENT: Negative for ear discharge, ear pain, hearing loss, nosebleeds, sinus pain and tinnitus.     Respiratory: Negative for cough, hemoptysis, sputum production, shortness of breath and wheezing. Cardiovascular: Negative for chest pain, palpitations, orthopnea, claudication and leg swelling. Gastrointestinal: Negative for abdominal pain, constipation, diarrhea, heartburn, nausea and vomiting. Genitourinary: Negative for dysuria, flank pain, frequency, hematuria and urgency. Musculoskeletal: Negative for back pain, falls, joint pain, myalgias and neck pain. Neurological: Positive for weakness. Negative for dizziness, tingling, focal weakness, seizures and headaches. Psychiatric/Behavioral: Negative for depression, hallucinations, memory loss, substance abuse and suicidal ideas. The patient is not nervous/anxious. Objective:   Physical Exam  Constitutional:       General: She is not in acute distress. Appearance: Normal appearance. HENT:      Head: Normocephalic and atraumatic. Mouth/Throat:      Mouth: Mucous membranes are moist.   Eyes:      Pupils: Pupils are equal, round, and reactive to light. Neck:      Musculoskeletal: No neck rigidity. Cardiovascular:      Rate and Rhythm: Normal rate and regular rhythm. Pulses: Normal pulses. Heart sounds: Normal heart sounds. Pulmonary:      Effort: Pulmonary effort is normal. No respiratory distress. Breath sounds: Normal breath sounds. No wheezing. Abdominal:      General: Bowel sounds are normal.      Palpations: Abdomen is soft. There is no mass. Tenderness: There is no abdominal tenderness. Musculoskeletal:         General: No swelling, tenderness, deformity or signs of injury. Skin:     General: Skin is warm and dry. Findings: No bruising or erythema. Neurological:      General: No focal deficit present. Mental Status: She is alert. Motor: No weakness.    Psychiatric:         Mood and Affect: Mood normal.          Visit Vitals  /67 (BP 1 Location: Right arm, BP Patient Position: At rest) Pulse 83   Temp 97.4 °F (36.3 °C)   Resp 18   Wt 55.3 kg (122 lb)   SpO2 96%   BMI 20.94 kg/m²           Data Review:       Recent Days:  Recent Labs     12/23/20  1250   WBC 12.2*   HGB 17.4   HCT 53.5*        Recent Labs     12/23/20  1250   *   K 3.7   *   CO2 22   *   BUN 52*   CREA 1.72*   CA 10.9*   MG 2.4   ALB 4.0   TBILI 1.0   ALT 15   INR 1.2*       Past Surgical History:   Procedure Laterality Date    BREAST SURGERY PROCEDURE UNLISTED  1970's    two surgeries to remove benign tumors from each breast    BREAST [address] [facility] multiple surgeries to remove cysts from each breast    HX AAA REPAIR N/A 2007    HX AAA REPAIR N/A 2016    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     HX APPENDECTOMY      in 9th grade    HX BREAST LUMPECTOMY Left 5/10/2017    LEFT BREAST DUCTAL EXCISION performed by Angi Smith MD at [address] HX CATARACT REMOVAL Bilateral 2015    HX GI N/A 07/19/2018    EGD with U/S    HX HERNIA REPAIR  2010    abd incisional hernia repair    HX HYSTERECTOMY      and one ovary removed    HX ORTHOPAEDIC Left 1992    foot surgery for fx heel    HX SMALL BOWEL RESECTION N/A Unknown    HX TONSILLECTOMY      HX UROLOGICAL Left 09/06/2018    Cystourethroscopy w/ indwelling ureteral stent insertion -- Dr Caryl Saez    HX UROLOGICAL  03/12/2019    Cysto, bilat RPG, left URS, holmium laser litho, stone extract, left JJ stent exchange. Dr. Kit Leon, [facility].     IR STENT PLACEMENT  11/2018    ureteral    VASCULAR SURGERY PROCEDURE UNLIST  05/23/2007    open AAA repair at [facility]        Family History   Problem Relation Age of Onset    Heart Disease Mother     Lung Disease Father         Past Medical History:   Diagnosis Date    Abdominal aortic aneurysm ([address]     Adverse effect of anesthesia 2017    hypoxia  with contual dementia    Adverse effect of anesthesia     BP flucuates    Aortitis (HCC)     Atrophic kidney     Chronic abdominal pain  D[facility] ([facility]     Hydronephrosis     Hypercholesterolemia     Hypertension     pt states she is unaware of HTN-states she \"takes metoprolol to regulate HR\"    Ill-defined condition     one episode of Bell's Palsy    Ill-defined condition late /early     GI bleed-required transfusions, transferred from [facility] to [address] thinks she may have had surgery, possible sm bowel resection    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[facility]     subsequent encounter    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[facility]     ongoing (taking ATBX)    Non-nicotine vapor product user     Dimentia,   some confusions    OAB (overactive bladder)     Retained ureteral stent     Right leg weakness     Solitary kidney     Unsteady gait     Ureteral stone     Uses walker     sometimes    UTI (urinary tract infection)         Social History     Tobacco Use    Smoking status: Former Smoker     Packs/day: 0.75     Years: 48.00     Pack years: 36.00     Quit date: 3/6/2007     Years since quittin.8    Smokeless tobacco: Never Used   Substance Use Topics    Alcohol use: No    Drug use: No      Assessment/Plan     1. Acute dehydration  Likely secondary to poor oral intake  Start normal saline for gentle rehydration    2. Suspected urinary tract infection  Urinalysis indicates bacteriuria  Urine culture has been ordered and pending  Start ceftriaxone empirically. 3.  Lactic acidosis  Likely secondary to dehydration  Lactic acid level is 3.3  Start IV fluids to rehydrate gently, repeat lactic acid in a.m.    4.  Essential hypertension   Blood pressures acceptable  Restart home medications including amlodipine and metoprolol    CBC, BMP, lactic acid level in a.m. SCDs for DVT prophylaxis  Place in observation overnight. Full code. I attempted to call patient's sister Esthela Cardenas at [phone] to update her on current status and to discuss treatment plan but no answer.       Lynnette Ta NP

## 2020-12-23 NOTE — ED NOTES
TRANSFER - OUT REPORT:    Verbal report given to Tanvi Lu on Janyamil Brochure  being transferred to [facility] for routine progression of care       Report consisted of patients Situation, Background, Assessment and   Recommendations(SBAR). Information from the following report(s) ED Summary was reviewed with the receiving nurse. Opportunity for questions and clarification was provided.       Patient transported with:   Registered Nurse

## 2020-12-24 PROBLEM — N30.00 ACUTE CYSTITIS WITHOUT HEMATURIA: Status: ACTIVE | Noted: 2020-12-24

## 2020-12-24 LAB
ANION GAP SERPL CALC-SCNC: 10 MMOL/L (ref 5–15)
BASOPHILS # BLD: 0.1 K/UL (ref 0–0.2)
BASOPHILS NFR BLD: 1 % (ref 0–2.5)
BUN SERPL-MCNC: 39 MG/DL (ref 6–20)
BUN/CREAT SERPL: 32 (ref 12–20)
CA-I BLD-MCNC: 9.5 MG/DL (ref 8.5–10.1)
CHLORIDE SERPL-SCNC: 108 MMOL/L (ref 97–108)
CO2 SERPL-SCNC: 27 MMOL/L (ref 21–32)
CREAT SERPL-MCNC: 1.22 MG/DL (ref 0.55–1.02)
EOSINOPHIL # BLD: 0.2 K/UL (ref 0–0.7)
EOSINOPHIL NFR BLD: 3 % (ref 0.9–2.9)
ERYTHROCYTE [DISTWIDTH] IN BLOOD BY AUTOMATED COUNT: 16.7 % (ref 11.5–14.5)
GLUCOSE SERPL-MCNC: 91 MG/DL (ref 65–100)
HCT VFR BLD AUTO: 43.1 % (ref 36–46)
HGB BLD-MCNC: 14 G/DL (ref 13.5–17.5)
LACTATE SERPL-SCNC: 1.2 MMOL/L (ref 0.4–2)
LYMPHOCYTES # BLD: 1.7 K/UL (ref 1–4.8)
LYMPHOCYTES NFR BLD: 22 % (ref 20.5–51.1)
MCH RBC QN AUTO: 28.6 PG (ref 31–34)
MCHC RBC AUTO-ENTMCNC: 32.5 G/DL (ref 31–36)
MCV RBC AUTO: 87.7 FL (ref 80–100)
MONOCYTES # BLD: 0.6 K/UL (ref 0.2–2.4)
MONOCYTES NFR BLD: 8 % (ref 1.7–9.3)
NEUTS SEG # BLD: 5.5 K/UL (ref 1.8–7.7)
NEUTS SEG NFR BLD: 66 % (ref 42–75)
NRBC # BLD: 0.01 K/UL
NRBC BLD-RTO: 10 PER 100 WBC
PLATELET # BLD AUTO: 102 K/UL
PMV BLD AUTO: 9.7 FL (ref 6.5–11.5)
POTASSIUM SERPL-SCNC: 3.2 MMOL/L (ref 3.5–5.1)
RBC # BLD AUTO: 4.91 M/UL (ref 4.5–5.9)
SODIUM SERPL-SCNC: 145 MMOL/L (ref 136–145)
WBC # BLD AUTO: 8.1 K/UL (ref 4.4–11.3)

## 2020-12-24 PROCEDURE — 83605 ASSAY OF LACTIC ACID: CPT

## 2020-12-24 PROCEDURE — 74011250637 HC RX REV CODE- 250/637: Performed by: INTERNAL MEDICINE

## 2020-12-24 PROCEDURE — 85025 COMPLETE CBC W/AUTO DIFF WBC: CPT

## 2020-12-24 PROCEDURE — 99218 HC RM OBSERVATION: CPT

## 2020-12-24 PROCEDURE — 80048 BASIC METABOLIC PNL TOTAL CA: CPT

## 2020-12-24 PROCEDURE — 74011000258 HC RX REV CODE- 258: Performed by: NURSE PRACTITIONER

## 2020-12-24 PROCEDURE — 74011250637 HC RX REV CODE- 250/637: Performed by: NURSE PRACTITIONER

## 2020-12-24 PROCEDURE — 36415 COLL VENOUS BLD VENIPUNCTURE: CPT

## 2020-12-24 RX ORDER — POTASSIUM CHLORIDE 750 MG/1
20 TABLET, FILM COATED, EXTENDED RELEASE ORAL DAILY
Status: DISCONTINUED | OUTPATIENT
Start: 2020-12-24 | End: 2020-12-25

## 2020-12-24 RX ADMIN — POTASSIUM CHLORIDE 20 MEQ: 750 TABLET, FILM COATED, EXTENDED RELEASE ORAL at 16:04

## 2020-12-24 RX ADMIN — PANTOPRAZOLE SODIUM 40 MG: 40 TABLET, DELAYED RELEASE ORAL at 08:00

## 2020-12-24 RX ADMIN — OXYBUTYNIN CHLORIDE 5 MG: 5 TABLET ORAL at 16:04

## 2020-12-24 RX ADMIN — OXYBUTYNIN CHLORIDE 5 MG: 5 TABLET ORAL at 08:00

## 2020-12-24 RX ADMIN — ATORVASTATIN CALCIUM 20 MG: 20 TABLET, FILM COATED ORAL at 21:21

## 2020-12-24 RX ADMIN — OXYBUTYNIN CHLORIDE 5 MG: 5 TABLET ORAL at 21:21

## 2020-12-24 RX ADMIN — SODIUM CHLORIDE 100 ML/HR: 4.5 INJECTION, SOLUTION INTRAVENOUS at 07:59

## 2020-12-24 RX ADMIN — AMLODIPINE BESYLATE 2.5 MG: 2.5 TABLET ORAL at 21:21

## 2020-12-24 RX ADMIN — TAMSULOSIN HYDROCHLORIDE 0.4 MG: 0.4 CAPSULE ORAL at 21:21

## 2020-12-24 NOTE — PROGRESS NOTES
Progress Note  Date:12/24/2020       Room:205/01  Patient Katerine Martin     YOB: 1940     Age:80 y.o. Subjective    Patient is a 60-year-old female with history of hypertension, urinary incontinence and GERD who was brought into the emergency department this afternoon by EMS after the patient was found on the floor at home by her neighbor. Patient is a poor historian and didn't know for how long she was on the floor and power of  (Siobhansister at [phone]) but could not be reached. This morning pt was awake and oriented to person but not to place or time and reported she was feeling much better though she admits to not remembering what happened to her        Objective           Vitals Last 24 Hours:  Patient Vitals for the past 24 hrs:   Temp Pulse Resp BP SpO2   12/24/20 0911 97.1 °F (36.2 °C) (!) 55 18 (!) 119/50    12/24/20 0800  (!) 55      12/24/20 0645 97.1 °F (36.2 °C) (!) 55 18 (!) 119/50 97 %   12/24/20 0610 97.2 °F (36.2 °C) (!) 50 20 (!) 111/48 96 %   12/24/20 0037 97.4 °F (36.3 °C) (!) 55 20 (!) 123/98 96 %   12/23/20 1936 96.9 °F (36.1 °C) (!) 54 18 (!) 110/51 97 %   12/23/20 1625 97.5 °F (36.4 °C) 72 20 139/78 98 %   12/23/20 1600  72      12/23/20 1430  83 18 120/67 96 %   12/23/20 1330  77 18 (!) 159/75 96 %   12/23/20 1230  82 18 110/72 97 %   12/23/20 1218     96 %   12/23/20 1217 97.4 °F (36.3 °C)       12/23/20 1209  86 21 134/86 95 %        I/O (24Hr): Intake/Output Summary (Last 24 hours) at 12/24/2020 1019  Last data filed at 12/24/2020 0610  Gross per 24 hour   Intake 3494.98 ml   Output    Net 3494.98 ml       Physical Exam:  General Appearance:  Comfortable, well-appearing. No acute distress. HEENT: NCAT, EOMI, No deformities or discharge, Neck supple with trachea midline. Respiratory: Normal respiratory rate. Breath sounds clear to auscultation. Heart: S1 normal and S2 normal.  No tachycardia  Abdomen: Soft and flat. Bowel sounds are normal. No rebound or guarding. No tenderness to palpation. Extremities: Normal range of motion. No acute deformities. Neurological: Alert and oriented to person, place and time. Skin:  Warm and dry.       Medications           Current Facility-Administered Medications   Medication Dose Route Frequency    cefTRIAXone (ROCEPHIN) 1 g in 0.9% sodium chloride (MBP/ADV) 50 mL MBP  1 g IntraVENous Q24H    amLODIPine (NORVASC) tablet 2.5 mg  2.5 mg Oral QHS    oxybutynin (DITROPAN) tablet 5 mg  5 mg Oral TID    pantoprazole (PROTONIX) tablet 40 mg  40 mg Oral ACB    0.45% sodium chloride infusion  100 mL/hr IntraVENous CONTINUOUS    acetaminophen (TYLENOL) tablet 650 mg  650 mg Oral Q6H PRN    ondansetron (ZOFRAN) injection 4 mg  4 mg IntraVENous Q8H PRN    metoprolol succinate (TOPROL-XL) XL tablet 50 mg  50 mg Oral DAILY    atorvastatin (LIPITOR) tablet 20 mg  20 mg Oral QHS    tamsulosin (FLOMAX) capsule 0.4 mg  0.4 mg Oral DAILY         Allergies         Codeine, Codeine, Ibuprofen, Morphine, and Morphine       Labs/Imaging/Diagnostics      Labs:  Recent Results (from the past 24 hour(s))   EKG, 12 LEAD, INITIAL    Collection Time: 12/23/20 12:12 PM   Result Value Ref Range    Ventricular Rate 85 BPM    Atrial Rate 84 BPM    P-R Interval 154 ms    QRS Duration 89 ms    Q-T Interval 400 ms    QTC Calculation (Bezet) 476 ms    Calculated P Axis 0 degrees    Calculated R Axis 46 degrees    Calculated T Axis 65 degrees    Diagnosis       Pacemaker spikes or artifacts  Sinus rhythm  Left atrial enlargement  Anterior infarct, old  Minimal ST depression, lateral leads    Confirmed by [facility] ([id number] on 12/23/2020 8:17:26 PM     CBC WITH AUTOMATED DIFF    Collection Time: 12/23/20 12:50 PM   Result Value Ref Range    WBC 12.2 (H) 4.4 - 11.3 K/uL    RBC 6.15 (H) 4.50 - 5.90 M/uL    HGB 17.4 13.5 - 17.5 g/dL    HCT 53.5 (H) 36 - 46 %    MCV 87.0 80 - 100 FL    MCH 28.4 (L) 31 - 34 PG MCHC 32.6 31.0 - 36.0 g/dL    RDW 16.8 (H) 11.5 - 14.5 %    PLATELET 969 K/uL    MPV 9.5 6.5 - 11.5 FL    NRBC 10.0  WBC    ABSOLUTE NRBC 0.02 K/uL    NEUTROPHILS 83 (H) 42 - 75 %    LYMPHOCYTES 9 (L) 20.5 - 51.1 %    MONOCYTES 7 1.7 - 9.3 %    EOSINOPHILS 0 (L) 0.9 - 2.9 %    BASOPHILS 1 0.0 - 2.5 %    ABS. NEUTROPHILS 10.2 (H) 1.8 - 7.7 K/UL    ABS. LYMPHOCYTES 1.1 1.0 - 4.8 K/UL    ABS. MONOCYTES 0.8 0.2 - 2.4 K/UL    ABS. EOSINOPHILS 0.0 0.0 - 0.7 K/UL    ABS. BASOPHILS 0.1 0.0 - 0.2 K/UL   PROTHROMBIN TIME + INR    Collection Time: 12/23/20 12:50 PM   Result Value Ref Range    Prothrombin time 12.0 (H) 9.0 - 11.1 sec    INR 1.2 (H) 0.9 - 1.1     METABOLIC PANEL, COMPREHENSIVE    Collection Time: 12/23/20 12:50 PM   Result Value Ref Range    Sodium 148 (H) 136 - 145 mmol/L    Potassium 3.7 3.5 - 5.1 mmol/L    Chloride 113 (H) 97 - 108 mmol/L    CO2 22 21 - 32 mmol/L    Anion gap 13 5 - 15 mmol/L    Glucose 111 (H) 65 - 100 mg/dL    BUN 52 (H) 6 - 20 mg/dL    Creatinine 1.72 (H) 0.55 - 1.02 mg/dL    BUN/Creatinine ratio 30 (H) 12 - 20      GFR est AA 35 (L) >60 ml/min/1.73m2    GFR est non-AA 29 (L) >60 ml/min/1.73m2    Calcium 10.9 (H) 8.5 - 10.1 mg/dL    Bilirubin, total 1.0 0.2 - 1.0 mg/dL    AST (SGOT) 39 (H) 15 - 37 U/L    ALT (SGPT) 15 12 - 78 U/L    Alk.  phosphatase 81 45 - 117 U/L    Protein, total 8.5 (H) 6.4 - 8.2 g/dL    Albumin 4.0 3.5 - 5.0 g/dL    Globulin 4.5 (H) 2.0 - 4.0 g/dL    A-G Ratio 0.9 (L) 1.1 - 2.2     TROPONIN I    Collection Time: 12/23/20 12:50 PM   Result Value Ref Range    Troponin-I, Qt. <0.05 <0.05 ng/mL   MAGNESIUM    Collection Time: 12/23/20 12:50 PM   Result Value Ref Range    Magnesium 2.4 1.6 - 2.4 mg/dL   CK    Collection Time: 12/23/20 12:50 PM   Result Value Ref Range    CK 78 26 - 192 U/L   LACTIC ACID    Collection Time: 12/23/20 12:50 PM   Result Value Ref Range    Lactic acid 3.3 (HH) 0.4 - 2.0 mmol/L   URINALYSIS W/ RFLX MICROSCOPIC    Collection Time: 12/23/20 1:30 PM   Result Value Ref Range    Color Yellow/Straw      Appearance Clear Clear      Specific gravity 1.032      pH (UA) 5.5 5.0 - 8.0      Protein 30 (A) Negative mg/dL    Glucose Negative Negative mg/dL    Ketone Trace (A) Negative mg/dL    Bilirubin Moderate (A) Negative      Blood Trace (A) Negative      Urobilinogen 1.0 0.2 - 1.0 EU/dL    Nitrites Negative Negative      Leukocyte Esterase Negative Negative      Bilirubin UA, confirm Positive     URINE MICROSCOPIC    Collection Time: 12/23/20  1:30 PM   Result Value Ref Range    WBC 5-10 0 - 5 /hpf    RBC 10-20 0 - 3 /hpf    Bacteria 4+ (A) Negative /hpf   LACTIC ACID    Collection Time: 12/23/20  4:15 PM   Result Value Ref Range    Lactic acid 2.2 (HH) 0.4 - 2.0 mmol/L   LACTIC ACID    Collection Time: 12/23/20  9:21 PM   Result Value Ref Range    Lactic acid 1.7 0.4 - 2.0 mmol/L   CBC WITH AUTOMATED DIFF    Collection Time: 12/24/20  5:25 AM   Result Value Ref Range    WBC 8.1 4.4 - 11.3 K/uL    RBC 4.91 4.50 - 5.90 M/uL    HGB 14.0 13.5 - 17.5 g/dL    HCT 43.1 36 - 46 %    MCV 87.7 80 - 100 FL    MCH 28.6 (L) 31 - 34 PG    MCHC 32.5 31.0 - 36.0 g/dL    RDW 16.7 (H) 11.5 - 14.5 %    PLATELET 989 K/uL    MPV 9.7 6.5 - 11.5 FL    NRBC 10.0  WBC    ABSOLUTE NRBC 0.01 K/uL    NEUTROPHILS 66 42 - 75 %    LYMPHOCYTES 22 20.5 - 51.1 %    MONOCYTES 8 1.7 - 9.3 %    EOSINOPHILS 3 (H) 0.9 - 2.9 %    BASOPHILS 1 0.0 - 2.5 %    ABS. NEUTROPHILS 5.5 1.8 - 7.7 K/UL    ABS. LYMPHOCYTES 1.7 1.0 - 4.8 K/UL    ABS. MONOCYTES 0.6 0.2 - 2.4 K/UL    ABS. EOSINOPHILS 0.2 0.0 - 0.7 K/UL    ABS.  BASOPHILS 0.1 0.0 - 0.2 K/UL   METABOLIC PANEL, BASIC    Collection Time: 12/24/20  5:25 AM   Result Value Ref Range    Sodium 145 136 - 145 mmol/L    Potassium 3.2 (L) 3.5 - 5.1 mmol/L    Chloride 108 97 - 108 mmol/L    CO2 27 21 - 32 mmol/L    Anion gap 10 5 - 15 mmol/L    Glucose 91 65 - 100 mg/dL    BUN 39 (H) 6 - 20 mg/dL    Creatinine 1.22 (H) 0.55 - 1.02 mg/dL BUN/Creatinine ratio 32 (H) 12 - 20      GFR est AA 52 (L) >60 ml/min/1.73m2    GFR est non-AA 43 (L) >60 ml/min/1.73m2    Calcium 9.5 8.5 - 10.1 mg/dL   LACTIC ACID    Collection Time: 12/24/20  6:00 AM   Result Value Ref Range    Lactic acid 1.2 0.4 - 2.0 mmol/L        Trended key labs include:  Recent Labs     12/24/20  0525 12/23/20  1250   WBC 8.1 12.2*   HGB 14.0 17.4   HCT 43.1 53.5*    160     Recent Labs     12/24/20  0525 12/23/20  1250    148*   K 3.2* 3.7    113*   CO2 27 22   GLU 91 111*   BUN 39* 52*   CREA 1.22* 1.72*   CA 9.5 10.9*   MG  --  2.4   ALB  --  4.0   TBILI  --  1.0   ALT  --  15   INR  --  1.2*       Imaging Last 24 Hours:  Ct Head Wo Cont    Result Date: 12/23/2020  EXAM: CT HEAD WO CONT INDICATION: FALL COMPARISON: None. Technique: Unenhanced CT of the head was performed using 5 mm images. Brain and bone windows were generated. Coronal and sagittal reformats. CT dose reduction was achieved through use of a standardized protocol tailored for this examination and automatic exposure control for dose modulation. FINDINGS: Prominent ventricles and sulci. Moderate white matter disease. There is no intracranial hemorrhage, extra-axial collection, or mass effect. The basilar cisterns are open. No CT evidence of acute infarct. No acute skull fracture. The visualized portions of the paranasal sinuses and mastoid air cells are clear. Bilateral cataract surgeries. IMPRESSION: No acute intracranial finding. Xr Chest Port    Result Date: 12/23/2020  EXAM: XR CHEST PORT INDICATION: WEAKNESS COMPARISON: None. FINDINGS: Unremarkable cardiomediastinal contours. No focal airspace consolidation. No pneumothorax or pleural effusion. No acute fracture or dislocation. IMPRESSION: No acute finding.          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     Pt improved and now very much awake though still not oriented    Hypertension  Bps stable, continue amlodipine, HCTZ, metoprolol    Acute cystitis without hematuria  Currently on rocephin and awaiting urine culture                Electronically signed by Leah Gilliam MD, Bill Felipe on 12/24/2020 at 9:53 AM

## 2020-12-24 NOTE — ASSESSMENT & PLAN NOTE
Likely secondary to poor oral intake  Given normal saline for gentle rehydration.     Pt improved and now very much awake though still not oriented  -12/25/2020 patient is eating and drinking as per nursing so we will continue to hold on restarting her IV.  -12/26/2020  BUN/crea continues to improve, continue to monitor  -12/27/2020 renal function stable continue IV fluids  -1/1/2021 resolved patient no longer on IV fluids and tolerating orals

## 2020-12-24 NOTE — ROUTINE PROCESS
Bedside shift change report given to glo barbour (oncoming nurse) by Sabina ovalle r.n.(offgoing nurse). Report included the following information SBAR.

## 2020-12-24 NOTE — PROGRESS NOTES
Physical Therapy Screening:  A referral to physical therapy order is indicated when the patient is medically stable. A screening was performed on this patient's chart based on their entrance into acute care and potential need for physical therapy identified. Pt is disoriented and provides limited subjective information. Upon screening, the patient may benefit from physical therapy consult, but may be limited due to cognitive limitations. Please consult for physical therapy if you would like an evaluation to be completed. Thank you.     Crow Palma, PT, DPT

## 2020-12-24 NOTE — PROGRESS NOTES
Patient A&O to self only at this time. Continues to talk to herself and pick at armbands and other accessories. Wrapped LFA #20 PIV in kerlex while infusing 1/2 NS at 100 ml/hr. Denies pain or dyspnea on room air. Trachea midline. Lungs clear BUL, diminished bases. Heart SB 52 bpm.  Swallows pills whole without difficulty. No cough or congestion noted. Skin intact. Incontinent of urine and stool occasionally. Skin intact with evidence of prior needle sticks from difficulty with IV placement and lab sticks. VS WNL. Repositioned patient in the bed. Patient turns herself. Bed in lowest position, bed alarm on, 3 side rails up, and tray table with patient belongings within her reach.

## 2020-12-24 NOTE — ROUTINE PROCESS
Pt.confused head hanging out of bed. Pt. Pull iv line infiltrated. Dr. Taty Dawson made aware. Okay to leave iv site out.

## 2020-12-24 NOTE — ASSESSMENT & PLAN NOTE
Currently on rocephin and awaiting urine culture  -12/25/2020 urine culture still pending but will change antibiotics to Augmentin  -12/26/20 sister reports that pt with chronic UTI and is on prophylactic abx that is being managed by   -12/27/2020 continue with Augmentin for UTI  -1/2/2021 patient with chronic UTI on prophylactic antibiotics  -1/3/2021 continue with prophylactic antibiotics for chronic UTI we will not change the regimen to daily

## 2020-12-24 NOTE — ASSESSMENT & PLAN NOTE
Bps stable, continue amlodipine, HCTZ, metoprolol  -1/1/2021 blood pressure stable continue same  -1/2/2021 blood pressure stable, continue same  -1/3/2021 blood pressures well controlled on amlodipine

## 2020-12-25 PROBLEM — D69.6 THROMBOCYTOPENIA (HCC): Status: ACTIVE | Noted: 2020-12-25

## 2020-12-25 PROBLEM — E87.6 HYPOKALEMIA: Status: ACTIVE | Noted: 2020-12-25

## 2020-12-25 LAB
ANION GAP SERPL CALC-SCNC: 7 MMOL/L (ref 5–15)
BASOPHILS # BLD: 0 K/UL (ref 0–0.2)
BASOPHILS NFR BLD: 1 % (ref 0–2.5)
BUN SERPL-MCNC: 24 MG/DL (ref 6–20)
BUN/CREAT SERPL: 21 (ref 12–20)
CA-I BLD-MCNC: 9.6 MG/DL (ref 8.5–10.1)
CHLORIDE SERPL-SCNC: 109 MMOL/L (ref 97–108)
CO2 SERPL-SCNC: 27 MMOL/L (ref 21–32)
CREAT SERPL-MCNC: 1.14 MG/DL (ref 0.55–1.02)
EOSINOPHIL # BLD: 0.2 K/UL (ref 0–0.7)
EOSINOPHIL NFR BLD: 3 % (ref 0.9–2.9)
ERYTHROCYTE [DISTWIDTH] IN BLOOD BY AUTOMATED COUNT: 16.4 % (ref 11.5–14.5)
GLUCOSE SERPL-MCNC: 104 MG/DL (ref 65–100)
HCT VFR BLD AUTO: 44.4 % (ref 36–46)
HGB BLD-MCNC: 14.7 G/DL (ref 13.5–17.5)
LYMPHOCYTES # BLD: 1.1 K/UL (ref 1–4.8)
LYMPHOCYTES NFR BLD: 18 % (ref 20.5–51.1)
MAGNESIUM SERPL-MCNC: 1.9 MG/DL (ref 1.6–2.4)
MCH RBC QN AUTO: 28.8 PG (ref 31–34)
MCHC RBC AUTO-ENTMCNC: 33.2 G/DL (ref 31–36)
MCV RBC AUTO: 86.7 FL (ref 80–100)
MONOCYTES # BLD: 0.6 K/UL (ref 0.2–2.4)
MONOCYTES NFR BLD: 9 % (ref 1.7–9.3)
NEUTS SEG # BLD: 4.2 K/UL (ref 1.8–7.7)
NEUTS SEG NFR BLD: 69 % (ref 42–75)
NRBC # BLD: 0.01 K/UL
NRBC BLD-RTO: 20 PER 100 WBC
PLATELET # BLD AUTO: 84 K/UL
PMV BLD AUTO: 9.7 FL (ref 6.5–11.5)
POTASSIUM SERPL-SCNC: 3.4 MMOL/L (ref 3.5–5.1)
RBC # BLD AUTO: 5.11 M/UL (ref 4.5–5.9)
SODIUM SERPL-SCNC: 143 MMOL/L (ref 136–145)
WBC # BLD AUTO: 6.2 K/UL (ref 4.4–11.3)

## 2020-12-25 PROCEDURE — 36415 COLL VENOUS BLD VENIPUNCTURE: CPT

## 2020-12-25 PROCEDURE — 99218 HC RM OBSERVATION: CPT

## 2020-12-25 PROCEDURE — 74011250637 HC RX REV CODE- 250/637: Performed by: INTERNAL MEDICINE

## 2020-12-25 PROCEDURE — 83735 ASSAY OF MAGNESIUM: CPT

## 2020-12-25 PROCEDURE — 74011250637 HC RX REV CODE- 250/637: Performed by: NURSE PRACTITIONER

## 2020-12-25 PROCEDURE — 85025 COMPLETE CBC W/AUTO DIFF WBC: CPT

## 2020-12-25 PROCEDURE — 80048 BASIC METABOLIC PNL TOTAL CA: CPT

## 2020-12-25 RX ORDER — AMOXICILLIN AND CLAVULANATE POTASSIUM 875; 125 MG/1; MG/1
1 TABLET, FILM COATED ORAL 2 TIMES DAILY WITH MEALS
Status: DISCONTINUED | OUTPATIENT
Start: 2020-12-25 | End: 2021-01-03

## 2020-12-25 RX ORDER — POTASSIUM CHLORIDE 750 MG/1
20 TABLET, FILM COATED, EXTENDED RELEASE ORAL 2 TIMES DAILY
Status: DISCONTINUED | OUTPATIENT
Start: 2020-12-25 | End: 2021-01-05 | Stop reason: HOSPADM

## 2020-12-25 RX ADMIN — POTASSIUM CHLORIDE 20 MEQ: 750 TABLET, FILM COATED, EXTENDED RELEASE ORAL at 08:38

## 2020-12-25 RX ADMIN — AMOXICILLIN AND CLAVULANATE POTASSIUM 1 TABLET: 875; 125 TABLET, FILM COATED ORAL at 17:16

## 2020-12-25 RX ADMIN — AMOXICILLIN AND CLAVULANATE POTASSIUM 1 TABLET: 875; 125 TABLET, FILM COATED ORAL at 10:16

## 2020-12-25 RX ADMIN — ATORVASTATIN CALCIUM 20 MG: 20 TABLET, FILM COATED ORAL at 21:38

## 2020-12-25 RX ADMIN — OXYBUTYNIN CHLORIDE 5 MG: 5 TABLET ORAL at 08:34

## 2020-12-25 RX ADMIN — OXYBUTYNIN CHLORIDE 5 MG: 5 TABLET ORAL at 17:16

## 2020-12-25 RX ADMIN — PANTOPRAZOLE SODIUM 40 MG: 40 TABLET, DELAYED RELEASE ORAL at 08:34

## 2020-12-25 RX ADMIN — OXYBUTYNIN CHLORIDE 5 MG: 5 TABLET ORAL at 21:38

## 2020-12-25 RX ADMIN — POTASSIUM CHLORIDE 20 MEQ: 750 TABLET, FILM COATED, EXTENDED RELEASE ORAL at 17:16

## 2020-12-25 RX ADMIN — AMLODIPINE BESYLATE 2.5 MG: 2.5 TABLET ORAL at 21:38

## 2020-12-25 RX ADMIN — METOPROLOL SUCCINATE 50 MG: 50 TABLET, EXTENDED RELEASE ORAL at 08:34

## 2020-12-25 NOTE — ASSESSMENT & PLAN NOTE
-12/25/2020 potassium remains low at 3.4 on potassium chloride 20 mEq daily.   Will increase to 20 mEq twice daily repeat level in the morning also include a mag level  -12/26/2020  Potassium normal, continue with supplements at 20 meq BID  -12/27/2020 potassium normal continue potassium chloride 20 equivalents twice daily  -1/1/2021 resolved

## 2020-12-25 NOTE — ROUTINE PROCESS
Received platelet value of 84 from the lab. Provider Dr Erika Calvin informed, plan is to discontinue SC Lovenox. No bleeding tendencies observed now.

## 2020-12-25 NOTE — ASSESSMENT & PLAN NOTE
-12/25/2020 platelets decreased from previous we will hold Lovenox and repeat level in the morning  -12/26/2020 platelets improved from previous, continue to follow  -1/1/2021 resolved

## 2020-12-25 NOTE — PROGRESS NOTES
Progress Note  Date:12/25/2020       Room:205/01  Patient Romelia Warner     YOB: 1940     Age:80 y.o. Subjective    Patient is a 41-year-old female with history of hypertension, urinary incontinence and GERD who was brought into the emergency department this afternoon by EMS after the patient was found on the floor at home by her neighbor. Lizyamil Hebertsuri is a poor historian and didn't know for how long she was on the floor and power of  (Siobhansister at [phone]) KVJ could not be reached. This morning pt was awake and oriented to person but not to place or time and and very pleasant without any complaints nor does seem like she has some discomfort with deep palpation of her abdomen. Patient removed her IV yesterday so has been getting IM antibiotics and drinking fluids and eating. Patient lives at home alone and does not seem to be able to take care of herself given the confusion      Objective           Vitals Last 24 Hours:  Patient Vitals for the past 24 hrs:   Temp Pulse Resp BP SpO2   12/25/20 0703 98 °F (36.7 °C) 80 20 (!) 144/72 94 %   12/25/20 0004 97.4 °F (36.3 °C) 71 18 (!) 143/65 99 %   12/24/20 1940 97.1 °F (36.2 °C) 73 18 (!) 107/58 95 %   12/24/20 1551  71      12/24/20 1539 98.4 °F (36.9 °C) 71 18 (!) 103/44 96 %   12/24/20 1149  (!) 53      12/24/20 1109 97.8 °F (36.6 °C) (!) 53 18 (!) 103/45 96 %   12/24/20 0911 97.1 °F (36.2 °C) (!) 55 18 (!) 119/50         I/O (24Hr): Intake/Output Summary (Last 24 hours) at 12/25/2020 0828  Last data filed at 12/24/2020 1737  Gross per 24 hour   Intake 1440 ml   Output    Net 1440 ml       Physical Exam:  General Appearance:  Comfortable, well-appearing. No acute distress. HEENT: NCAT, EOMI, No deformities or discharge, Neck supple with trachea midline. Respiratory: Normal respiratory rate. Breath sounds clear to auscultation. Heart: S1 normal and S2 normal.  No tachycardia  Abdomen: Soft and flat.   Bowel sounds are normal. No rebound or guarding. Tenderness to palpation mid abdomen. Extremities: Normal range of motion. No acute deformities. Neurological: Alert and oriented to person, place and time. Skin:  Warm and dry. Medications           Current Facility-Administered Medications   Medication Dose Route Frequency    potassium chloride SR (KLOR-CON 10) tablet 20 mEq  20 mEq Oral BID    amoxicillin-clavulanate (AUGMENTIN) 875-125 mg per tablet 1 Tab  1 Tab Oral BID WITH MEALS    amLODIPine (NORVASC) tablet 2.5 mg  2.5 mg Oral QHS    oxybutynin (DITROPAN) tablet 5 mg  5 mg Oral TID    pantoprazole (PROTONIX) tablet 40 mg  40 mg Oral ACB    0.45% sodium chloride infusion  100 mL/hr IntraVENous CONTINUOUS    acetaminophen (TYLENOL) tablet 650 mg  650 mg Oral Q6H PRN    ondansetron (ZOFRAN) injection 4 mg  4 mg IntraVENous Q8H PRN    metoprolol succinate (TOPROL-XL) XL tablet 50 mg  50 mg Oral DAILY    atorvastatin (LIPITOR) tablet 20 mg  20 mg Oral QHS    tamsulosin (FLOMAX) capsule 0.4 mg  0.4 mg Oral DAILY         Allergies         Codeine, Codeine, Ibuprofen, Morphine, and Morphine       Labs/Imaging/Diagnostics      Labs:  Recent Results (from the past 24 hour(s))   CBC WITH AUTOMATED DIFF    Collection Time: 12/25/20  6:17 AM   Result Value Ref Range    WBC 6.2 4.4 - 11.3 K/uL    RBC 5.11 4.50 - 5.90 M/uL    HGB 14.7 13.5 - 17.5 g/dL    HCT 44.4 36 - 46 %    MCV 86.7 80 - 100 FL    MCH 28.8 (L) 31 - 34 PG    MCHC 33.2 31.0 - 36.0 g/dL    RDW 16.4 (H) 11.5 - 14.5 %    PLATELET PENDING K/uL    MPV 9.7 6.5 - 11.5 FL    NRBC 20.0  WBC    ABSOLUTE NRBC 0.01 K/uL    NEUTROPHILS 69 42 - 75 %    LYMPHOCYTES 18 (L) 20.5 - 51.1 %    MONOCYTES 9 1.7 - 9.3 %    EOSINOPHILS 3 (H) 0.9 - 2.9 %    BASOPHILS 1 0.0 - 2.5 %    ABS. NEUTROPHILS 4.2 1.8 - 7.7 K/UL    ABS. LYMPHOCYTES 1.1 1.0 - 4.8 K/UL    ABS. MONOCYTES 0.6 0.2 - 2.4 K/UL    ABS. EOSINOPHILS 0.2 0.0 - 0.7 K/UL    ABS.  BASOPHILS 0.0 0.0 - 0.2 K/UL   METABOLIC PANEL, BASIC    Collection Time: 12/25/20  6:17 AM   Result Value Ref Range    Sodium 143 136 - 145 mmol/L    Potassium 3.4 (L) 3.5 - 5.1 mmol/L    Chloride 109 (H) 97 - 108 mmol/L    CO2 27 21 - 32 mmol/L    Anion gap 7 5 - 15 mmol/L    Glucose 104 (H) 65 - 100 mg/dL    BUN 24 (H) 6 - 20 mg/dL    Creatinine 1.14 (H) 0.55 - 1.02 mg/dL    BUN/Creatinine ratio 21 (H) 12 - 20      GFR est AA 56 (L) >60 ml/min/1.73m2    GFR est non-AA 46 (L) >60 ml/min/1.73m2    Calcium 9.6 8.5 - 10.1 mg/dL        Trended key labs include:  Recent Labs     12/25/20  0617 12/24/20  0525 12/23/20  1250   WBC 6.2 8.1 12.2*   HGB 14.7 14.0 17.4   HCT 44.4 43.1 53.5*   PLT PENDING 102 160     Recent Labs     12/25/20  0617 12/24/20  0525 12/23/20  1250    145 148*   K 3.4* 3.2* 3.7   * 108 113*   CO2 27 27 22   * 91 111*   BUN 24* 39* 52*   CREA 1.14* 1.22* 1.72*   CA 9.6 9.5 10.9*   MG  --   --  2.4   ALB  --   --  4.0   TBILI  --   --  1.0   ALT  --   --  15   INR  --   --  1.2*       Imaging Last 24 Hours:  No results found. Assessment//Plan           Patient Active Problem List    Diagnosis Date Noted    Hypokalemia 12/25/2020    [facility] ([facility] 12/25/2020    Acute cystitis without hematuria 12/24/2020    Dehydration 12/23/2020    Elevated LFTs 01/30/2019    AAA (abdominal aortic aneurysm) ([facility] 01/22/2019    Chronic abdominal pain 01/22/2019    Hypertension 01/22/2019    Hydronephrosis 01/22/2019    [facility] 07/15/2018    Vascular graft infection ([address] 07/15/2018        Dehydration  Likely secondary to poor oral intake  Given normal saline for gentle rehydration. Pt improved and now very much awake though still not oriented  -12/25/2020 patient is eating and drinking as per nursing so we will continue to hold on restarting her IV.     Hypertension  Bps stable, continue amlodipine, HCTZ, metoprolol    Acute cystitis without hematuria  Currently on rocephin and awaiting urine culture  -12/25/2020 urine culture still pending but will change antibiotics to Augmentin    Hypokalemia  -12/25/2020 potassium remains low at 3.4 on potassium chloride 20 mEq daily.   Will increase to 20 mEq twice daily repeat level in the morning also include a mag level    Thrombocytopenia ([address]  -12/25/2020 platelets decreased from previous we will hold Lovenox and repeat level in the morning                Electronically signed by Toshia Ferguson MD, Lucy Costa on 12/25/2020 at 8:16 AM

## 2020-12-25 NOTE — ROUTINE PROCESS
22G right forearm IV inserted now and saline locked. Patient tolerated this which was one time attempted. Help from Monisha Alvarado [id number].

## 2020-12-26 ENCOUNTER — APPOINTMENT (OUTPATIENT)
Dept: GENERAL RADIOLOGY | Age: 80
DRG: 884 | End: 2020-12-26
Attending: INTERNAL MEDICINE
Payer: MEDICARE

## 2020-12-26 PROBLEM — F03.90 DEMENTIA (HCC): Status: ACTIVE | Noted: 2020-12-26

## 2020-12-26 LAB
ANION GAP SERPL CALC-SCNC: 6 MMOL/L (ref 5–15)
BACTERIA SPEC CULT: ABNORMAL
BASOPHILS # BLD: 0 K/UL (ref 0–0.2)
BASOPHILS NFR BLD: 1 % (ref 0–2.5)
BUN SERPL-MCNC: 15 MG/DL (ref 6–20)
BUN/CREAT SERPL: 14 (ref 12–20)
CA-I BLD-MCNC: 9.4 MG/DL (ref 8.5–10.1)
CHLORIDE SERPL-SCNC: 107 MMOL/L (ref 97–108)
CO2 SERPL-SCNC: 27 MMOL/L (ref 21–32)
COLONY COUNT,CNT: ABNORMAL
CREAT SERPL-MCNC: 1.06 MG/DL (ref 0.55–1.02)
EOSINOPHIL # BLD: 0.3 K/UL (ref 0–0.7)
EOSINOPHIL NFR BLD: 4 % (ref 0.9–2.9)
ERYTHROCYTE [DISTWIDTH] IN BLOOD BY AUTOMATED COUNT: 16.7 % (ref 11.5–14.5)
GLUCOSE SERPL-MCNC: 117 MG/DL (ref 65–100)
HCT VFR BLD AUTO: 44.4 % (ref 36–46)
HGB BLD-MCNC: 15 G/DL (ref 13.5–17.5)
LYMPHOCYTES # BLD: 0.9 K/UL (ref 1–4.8)
LYMPHOCYTES NFR BLD: 11 % (ref 20.5–51.1)
MCH RBC QN AUTO: 29.3 PG (ref 31–34)
MCHC RBC AUTO-ENTMCNC: 33.8 G/DL (ref 31–36)
MCV RBC AUTO: 86.8 FL (ref 80–100)
MONOCYTES # BLD: 0.9 K/UL (ref 0.2–2.4)
MONOCYTES NFR BLD: 10 % (ref 1.7–9.3)
NEUTS SEG # BLD: 6.4 K/UL (ref 1.8–7.7)
NEUTS SEG NFR BLD: 74 % (ref 42–75)
NRBC # BLD: 0.01 K/UL
NRBC BLD-RTO: 10 PER 100 WBC
PLATELET # BLD AUTO: 95 K/UL
PMV BLD AUTO: 10.5 FL (ref 6.5–11.5)
POTASSIUM SERPL-SCNC: 3.5 MMOL/L (ref 3.5–5.1)
RBC # BLD AUTO: 5.12 M/UL (ref 4.5–5.9)
SODIUM SERPL-SCNC: 140 MMOL/L (ref 136–145)
SPECIAL REQUESTS,SREQ: ABNORMAL
WBC # BLD AUTO: 8.6 K/UL (ref 4.4–11.3)

## 2020-12-26 PROCEDURE — 85025 COMPLETE CBC W/AUTO DIFF WBC: CPT

## 2020-12-26 PROCEDURE — 74018 RADEX ABDOMEN 1 VIEW: CPT

## 2020-12-26 PROCEDURE — 80048 BASIC METABOLIC PNL TOTAL CA: CPT

## 2020-12-26 PROCEDURE — 74011250637 HC RX REV CODE- 250/637: Performed by: NURSE PRACTITIONER

## 2020-12-26 PROCEDURE — 74011250637 HC RX REV CODE- 250/637: Performed by: INTERNAL MEDICINE

## 2020-12-26 PROCEDURE — 36415 COLL VENOUS BLD VENIPUNCTURE: CPT

## 2020-12-26 PROCEDURE — 99218 HC RM OBSERVATION: CPT

## 2020-12-26 PROCEDURE — 74011000258 HC RX REV CODE- 258: Performed by: NURSE PRACTITIONER

## 2020-12-26 RX ADMIN — PANTOPRAZOLE SODIUM 40 MG: 40 TABLET, DELAYED RELEASE ORAL at 08:56

## 2020-12-26 RX ADMIN — OXYBUTYNIN CHLORIDE 5 MG: 5 TABLET ORAL at 17:16

## 2020-12-26 RX ADMIN — AMOXICILLIN AND CLAVULANATE POTASSIUM 1 TABLET: 875; 125 TABLET, FILM COATED ORAL at 08:55

## 2020-12-26 RX ADMIN — TAMSULOSIN HYDROCHLORIDE 0.4 MG: 0.4 CAPSULE ORAL at 08:56

## 2020-12-26 RX ADMIN — SODIUM CHLORIDE 100 ML/HR: 4.5 INJECTION, SOLUTION INTRAVENOUS at 05:14

## 2020-12-26 RX ADMIN — OXYBUTYNIN CHLORIDE 5 MG: 5 TABLET ORAL at 08:56

## 2020-12-26 RX ADMIN — POTASSIUM CHLORIDE 20 MEQ: 750 TABLET, FILM COATED, EXTENDED RELEASE ORAL at 17:16

## 2020-12-26 RX ADMIN — POTASSIUM CHLORIDE 20 MEQ: 750 TABLET, FILM COATED, EXTENDED RELEASE ORAL at 08:56

## 2020-12-26 RX ADMIN — METOPROLOL SUCCINATE 50 MG: 50 TABLET, EXTENDED RELEASE ORAL at 08:56

## 2020-12-26 RX ADMIN — AMOXICILLIN AND CLAVULANATE POTASSIUM 1 TABLET: 875; 125 TABLET, FILM COATED ORAL at 17:16

## 2020-12-26 NOTE — ASSESSMENT & PLAN NOTE
She has been having tenderness to palpation of the abdomen so a x-ray was done of the abdomen which did not reveal any acute findings.  -1/1/2021 resolved been an issue for a few days

## 2020-12-26 NOTE — ROUTINE PROCESS
Patient seen and assessed by the bedside, alert to self only and in no obvious pain or distress. Denies any pain now, able to take all pills whole with no difficulty. Patient ate about 80% of her breakfast tray.

## 2020-12-26 NOTE — PROGRESS NOTES
Progress Note  Date:12/26/2020       Room:[facility]  Patient Grace Martin     YOB: 1940     Age:80 y.o. Subjective    Patient is a 41-year-old female with history of hypertension, urinary incontinence and GERD who was brought into the emergency department this afternoon by EMS after the patient was found on the floor at home by her neighbor. Anabell Cardona is a poor historian and didn't know for how long she was on the floor and power of  (Siobhansister at [phone]) [facility] could not be reached. This morning pt was awake and oriented to person but not to place or time and and very pleasant without any complaints nor does seem like she has some discomfort with deep palpation of her abdomen. Patient removed her IV yesterday so has been getting IM antibiotics and drinking fluids and eating. Patient lives at home alone and does not seem to be able to take care of herself given the confusion    This morning patient seen lying in bed asleep but easily aroused oriented to person any complaints. No complaints documented per nursing        Objective           Vitals Last 24 Hours:  Patient Vitals for the past 24 hrs:   Temp Pulse Resp BP SpO2   12/26/20 1114 98.1 °F (36.7 °C) 77 18 (!) 147/64 94 %   12/26/20 0709 97.8 °F (36.6 °C) 85 20 (!) 143/76 95 %   12/26/20 0405 98.8 °F (37.1 °C) 84 18 (!) 140/65 95 %   12/26/20 0014 98 °F (36.7 °C) 64 20 139/64 94 %   12/25/20 2046 98.6 °F (37 °C) 80 18 (!) 150/66 96 %   12/25/20 1556 97.9 °F (36.6 °C) 87 18 129/63 (!) 63 %        I/O (24Hr): Intake/Output Summary (Last 24 hours) at 12/26/2020 1240  Last data filed at 12/26/2020 5841  Gross per 24 hour   Intake 240 ml   Output    Net 240 ml       Physical Exam:  General Appearance:  Comfortable, well-appearing. No acute distress. HEENT: NCAT, EOMI, No deformities or discharge, Neck supple with trachea midline. Respiratory: Normal respiratory rate. Breath sounds clear to auscultation.     Heart: S1 normal and S2 normal.  No tachycardia  Abdomen: Soft and flat. Bowel sounds are normal. No rebound or guarding. Tenderness to palpation mid abdomen. Extremities: Normal range of motion. No acute deformities. Neurological: Alert and oriented to person  Skin:  Warm and dry. Medications           Current Facility-Administered Medications   Medication Dose Route Frequency    potassium chloride SR (KLOR-CON 10) tablet 20 mEq  20 mEq Oral BID    amoxicillin-clavulanate (AUGMENTIN) 875-125 mg per tablet 1 Tab  1 Tab Oral BID WITH MEALS    amLODIPine (NORVASC) tablet 2.5 mg  2.5 mg Oral QHS    oxybutynin (DITROPAN) tablet 5 mg  5 mg Oral TID    pantoprazole (PROTONIX) tablet 40 mg  40 mg Oral ACB    0.45% sodium chloride infusion  100 mL/hr IntraVENous CONTINUOUS    acetaminophen (TYLENOL) tablet 650 mg  650 mg Oral Q6H PRN    ondansetron (ZOFRAN) injection 4 mg  4 mg IntraVENous Q8H PRN    metoprolol succinate (TOPROL-XL) XL tablet 50 mg  50 mg Oral DAILY    atorvastatin (LIPITOR) tablet 20 mg  20 mg Oral QHS    tamsulosin (FLOMAX) capsule 0.4 mg  0.4 mg Oral DAILY         Allergies         Codeine, Codeine, Ibuprofen, Morphine, and Morphine       Labs/Imaging/Diagnostics      Labs:  Recent Results (from the past 24 hour(s))   CBC WITH AUTOMATED DIFF    Collection Time: 12/26/20  5:00 AM   Result Value Ref Range    WBC 8.6 4.4 - 11.3 K/uL    RBC 5.12 4.50 - 5.90 M/uL    HGB 15.0 13.5 - 17.5 g/dL    HCT 44.4 36 - 46 %    MCV 86.8 80 - 100 FL    MCH 29.3 (L) 31 - 34 PG    MCHC 33.8 31.0 - 36.0 g/dL    RDW 16.7 (H) 11.5 - 14.5 %    PLATELET 95 K/uL    MPV 10.5 6.5 - 11.5 FL    NRBC 10.0  WBC    ABSOLUTE NRBC 0.01 K/uL    NEUTROPHILS 74 42 - 75 %    LYMPHOCYTES 11 (L) 20.5 - 51.1 %    MONOCYTES 10 (H) 1.7 - 9.3 %    EOSINOPHILS 4 (H) 0.9 - 2.9 %    BASOPHILS 1 0.0 - 2.5 %    ABS. NEUTROPHILS 6.4 1.8 - 7.7 K/UL    ABS. LYMPHOCYTES 0.9 (L) 1.0 - 4.8 K/UL    ABS. MONOCYTES 0.9 0.2 - 2.4 K/UL    ABS. EOSINOPHILS 0.3 0.0 - 0.7 K/UL    ABS. BASOPHILS 0.0 0.0 - 0.2 K/UL   METABOLIC PANEL, BASIC    Collection Time: 12/26/20  5:00 AM   Result Value Ref Range    Sodium 140 136 - 145 mmol/L    Potassium 3.5 3.5 - 5.1 mmol/L    Chloride 107 97 - 108 mmol/L    CO2 27 21 - 32 mmol/L    Anion gap 6 5 - 15 mmol/L    Glucose 117 (H) 65 - 100 mg/dL    BUN 15 6 - 20 mg/dL    Creatinine 1.06 (H) 0.55 - 1.02 mg/dL    BUN/Creatinine ratio 14 12 - 20      GFR est AA >60 >60 ml/min/1.73m2    GFR est non-AA 50 (L) >60 ml/min/1.73m2    Calcium 9.4 8.5 - 10.1 mg/dL        Trended key labs include:  Recent Labs     12/26/20  0500 12/25/20  0617 12/24/20  0525   WBC 8.6 6.2 8.1   HGB 15.0 14.7 14.0   HCT 44.4 44.4 43.1   PLT 95 84 102     Recent Labs     12/26/20  0500 12/25/20  0625 12/25/20  0617 12/24/20  0525 12/23/20  1250     --  143 145 148*   K 3.5  --  3.4* 3.2* 3.7     --  109* 108 113*   CO2 27  --  27 27 22   *  --  104* 91 111*   BUN 15  --  24* 39* 52*   CREA 1.06*  --  1.14* 1.22* 1.72*   CA 9.4  --  9.6 9.5 10.9*   MG  --  1.9  --   --  2.4   ALB  --   --   --   --  4.0   TBILI  --   --   --   --  1.0   ALT  --   --   --   --  15   INR  --   --   --   --  1.2*       Imaging Last 24 Hours:  Xr Abd (kub)    Result Date: 12/26/2020  Study: XR ABD (KUB) Clinical indication: Abd pain Comparison: None. Findings/impression: Bowel gas pattern is nonobstructive. No supine evidence of pneumoperitoneum. Extensive surgical clips in the abdomen. S-shaped thoracolumbar scoliosis. Severe degenerative disc disease. No acute osseous abnormality identified.          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 Pt improved and now very much awake though still not oriented  -12/25/2020 patient is eating and drinking as per nursing so we will continue to hold on restarting her IV.  -12/26/2020  BUN/crea continues to improve, continue to monitor    Hypertension  Bps stable, continue amlodipine, HCTZ, metoprolol    Acute cystitis without hematuria  Currently on rocephin and awaiting urine culture  -12/25/2020 urine culture still pending but will change antibiotics to Augmentin  -12/26/20 sister reports that pt with chronic UTI and is on prophylactic abx that is being managed by     Hypokalemia  -12/25/2020 potassium remains low at 3.4 on potassium chloride 20 mEq daily. Will increase to 20 mEq twice daily repeat level in the morning also include a mag level  -12/26/2020  Potassium normal, continue with supplements at 20 meq BID    Thrombocytopenia (HCC)  -12/25/2020 platelets decreased from previous we will hold Lovenox and repeat level in the morning  -12/26/2020 platelets improved from previous, continue to follow    Dementia ([facility] [address]  Spoke to pt's sister, Pool Dorsey, who stated that it is only her and the patient in the family and pt has always refused to do a living well or power of . As per sister patient has been told many times before that she is not to live alone and at one point was in the nursing home in [place] until about a year ago when she decided she was going to live with her sister but only did so for short time then moved back to [facility] and has been on her home. Patient is normally followed by Dr. Jane Doan.   Patient's sister was informed that we will attempt to get patient placed in a nursing home because given her mentation she is not able to live alone as she is not oriented to time or place. Abdominal pain  She has been having tenderness to palpation of the abdomen so a x-ray was done of the abdomen which did not reveal any acute findings.                 Electronically signed by Vilma Driver MD, Roger Salgado on 12/26/2020 at 12:16 PM

## 2020-12-26 NOTE — ASSESSMENT & PLAN NOTE
Spoke to pt's sister, Hawk Brower, who stated that it is only her and the patient in the family and pt has always refused to do a living well or power of . As per sister patient has been told many times before that she is not to live alone and at one point was in the nursing home in [place] until about a year ago when she decided she was going to live with her sister but only did so for short time then moved back to [facility] and has been on her home. Patient is normally followed by Dr. Sammy Evans. Patient's sister was informed that we will attempt to get patient placed in a nursing home because given her mentation she is not able to live alone as she is not oriented to time or place. -12/27/2020 patient still oriented only to person and will need placement since she is unable to care for herself given her mentation  -1/1/2021 patient is oriented to person and time today and appears to be very lucid and coherent conversations at times  -1/2/2021 patient is oriented only to person today and very pleasantly confused  -1/3/2021 patient is very pleasant but not oriented to time and place.   Awaiting placement as patient is unable to care for herself

## 2020-12-27 LAB
ANION GAP SERPL CALC-SCNC: 4 MMOL/L (ref 5–15)
BUN SERPL-MCNC: 15 MG/DL (ref 6–20)
BUN/CREAT SERPL: 14 (ref 12–20)
CA-I BLD-MCNC: 8.7 MG/DL (ref 8.5–10.1)
CHLORIDE SERPL-SCNC: 106 MMOL/L (ref 97–108)
CO2 SERPL-SCNC: 28 MMOL/L (ref 21–32)
CREAT SERPL-MCNC: 1.08 MG/DL (ref 0.55–1.02)
ERYTHROCYTE [DISTWIDTH] IN BLOOD BY AUTOMATED COUNT: 16.8 % (ref 11.5–14.5)
GLUCOSE SERPL-MCNC: 110 MG/DL (ref 65–100)
HCT VFR BLD AUTO: 42.7 % (ref 36–46)
HGB BLD-MCNC: 14.2 G/DL (ref 13.5–17.5)
MCH RBC QN AUTO: 28.9 PG (ref 31–34)
MCHC RBC AUTO-ENTMCNC: 33.3 G/DL (ref 31–36)
MCV RBC AUTO: 86.9 FL (ref 80–100)
NRBC # BLD: 0 K/UL
NRBC BLD-RTO: 0 PER 100 WBC
PLATELET # BLD AUTO: 89 K/UL
PMV BLD AUTO: 10.5 FL (ref 6.5–11.5)
POTASSIUM SERPL-SCNC: 3.7 MMOL/L (ref 3.5–5.1)
RBC # BLD AUTO: 4.91 M/UL (ref 4.5–5.9)
SODIUM SERPL-SCNC: 138 MMOL/L (ref 136–145)
WBC # BLD AUTO: 8.8 K/UL (ref 4.4–11.3)

## 2020-12-27 PROCEDURE — 99218 HC RM OBSERVATION: CPT

## 2020-12-27 PROCEDURE — 74011000258 HC RX REV CODE- 258: Performed by: NURSE PRACTITIONER

## 2020-12-27 PROCEDURE — 80048 BASIC METABOLIC PNL TOTAL CA: CPT

## 2020-12-27 PROCEDURE — 74011250637 HC RX REV CODE- 250/637: Performed by: INTERNAL MEDICINE

## 2020-12-27 PROCEDURE — 74011250637 HC RX REV CODE- 250/637: Performed by: NURSE PRACTITIONER

## 2020-12-27 PROCEDURE — 36415 COLL VENOUS BLD VENIPUNCTURE: CPT

## 2020-12-27 PROCEDURE — 85027 COMPLETE CBC AUTOMATED: CPT

## 2020-12-27 RX ADMIN — OXYBUTYNIN CHLORIDE 5 MG: 5 TABLET ORAL at 16:17

## 2020-12-27 RX ADMIN — OXYBUTYNIN CHLORIDE 5 MG: 5 TABLET ORAL at 00:00

## 2020-12-27 RX ADMIN — ATORVASTATIN CALCIUM 20 MG: 20 TABLET, FILM COATED ORAL at 00:00

## 2020-12-27 RX ADMIN — SODIUM CHLORIDE 100 ML/HR: 4.5 INJECTION, SOLUTION INTRAVENOUS at 23:38

## 2020-12-27 RX ADMIN — SODIUM CHLORIDE 100 ML/HR: 4.5 INJECTION, SOLUTION INTRAVENOUS at 06:01

## 2020-12-27 RX ADMIN — AMOXICILLIN AND CLAVULANATE POTASSIUM 1 TABLET: 875; 125 TABLET, FILM COATED ORAL at 16:17

## 2020-12-27 RX ADMIN — AMLODIPINE BESYLATE 2.5 MG: 2.5 TABLET ORAL at 00:00

## 2020-12-27 RX ADMIN — METOPROLOL SUCCINATE 50 MG: 50 TABLET, EXTENDED RELEASE ORAL at 08:17

## 2020-12-27 RX ADMIN — POTASSIUM CHLORIDE 20 MEQ: 750 TABLET, FILM COATED, EXTENDED RELEASE ORAL at 08:16

## 2020-12-27 RX ADMIN — POTASSIUM CHLORIDE 20 MEQ: 750 TABLET, FILM COATED, EXTENDED RELEASE ORAL at 16:17

## 2020-12-27 RX ADMIN — PANTOPRAZOLE SODIUM 40 MG: 40 TABLET, DELAYED RELEASE ORAL at 08:17

## 2020-12-27 RX ADMIN — ATORVASTATIN CALCIUM 20 MG: 20 TABLET, FILM COATED ORAL at 21:05

## 2020-12-27 RX ADMIN — OXYBUTYNIN CHLORIDE 5 MG: 5 TABLET ORAL at 21:05

## 2020-12-27 RX ADMIN — OXYBUTYNIN CHLORIDE 5 MG: 5 TABLET ORAL at 08:17

## 2020-12-27 RX ADMIN — AMLODIPINE BESYLATE 2.5 MG: 2.5 TABLET ORAL at 21:05

## 2020-12-27 RX ADMIN — TAMSULOSIN HYDROCHLORIDE 0.4 MG: 0.4 CAPSULE ORAL at 08:17

## 2020-12-27 RX ADMIN — SODIUM CHLORIDE 100 ML/HR: 4.5 INJECTION, SOLUTION INTRAVENOUS at 15:09

## 2020-12-27 RX ADMIN — AMOXICILLIN AND CLAVULANATE POTASSIUM 1 TABLET: 875; 125 TABLET, FILM COATED ORAL at 08:17

## 2020-12-27 NOTE — ROUTINE PROCESS
Pt alert and confused. Fall precautions in place. Pt attempted to get oob a few times, but easily redirected. V/s/s @ 15:30, see epr. # 22 to right f/a infusing 1/2 ns @ 75 CC/hr. Tele intact, sinus daryl to nsr. Took all meds. No s/s of pain or discomfort.

## 2020-12-27 NOTE — PROGRESS NOTES
Progress Note  Date:12/27/2020       Room:[facility]  Patient Vanita Martin     YOB: 1940     Age:80 y.o. Subjective    Patient is a 73-year-old female with history of hypertension, urinary incontinence and GERD who was brought into the emergency department this afternoon by EMS after the patient was found on the floor at home by her neighbor. Deonna Milner is a poor historian and didn't know for how long she was on the floor and power of  ([facility]ob[facility] [phone][facility] could not be reached. This morning pt was awake and oriented to person but not to place or time and and very pleasant without any complaints nor does seem like she has some discomfort with deep palpation of her abdomen.  Patient removed her IV yesterday so has been getting IM antibiotics and drinking fluids and eating.  Patient lives at home alone and does not seem to be able to take care of herself given the confusion     This morning patient seen lying in bed awake and oriented to person and as per usual very pleasant. No complaints documented per nursing    Review of Systems   Constitutional: Negative for activity change, appetite change, chills and fatigue. Cardiovascular: Negative for leg swelling. Gastrointestinal: Negative for abdominal distention, blood in stool, diarrhea, nausea and vomiting. Psychiatric/Behavioral: Positive for confusion. Negative for agitation and behavioral problems. Objective           Vitals Last 24 Hours:  Patient Vitals for the past 24 hrs:   Temp Pulse Resp BP SpO2   12/27/20 0707 96.9 °F (36.1 °C) 81 20 111/66 94 %   12/27/20 0604 98.3 °F (36.8 °C) 87 20 (!) 89/56 95 %   12/27/20 0023 98.6 °F (37 °C) 80 20 (!) 101/51 96 %   12/26/20 2021 98.7 °F (37.1 °C) 77 18 (!) 121/58 95 %   12/26/20 1524 97.4 °F (36.3 °C) 73 18 125/80 93 %   12/26/20 1114 98.1 °F (36.7 °C) 77 18 (!) 147/64 94 %        I/O (24Hr):   No intake or output data in the 24 hours ending 12/27/20 1028    Physical Exam:  General Appearance:  Comfortable, well-appearing. No acute distress. HEENT: NCAT, EOMI, No deformities or discharge, Neck supple with trachea midline. Respiratory: Normal respiratory rate. Breath sounds clear to auscultation. Heart: S1 normal and S2 normal.  No tachycardia  Abdomen: Soft and flat. Bowel sounds are normal. No rebound or guarding. tenderness to palpation mid to lower abdomen. Extremities: Normal range of motion. No acute deformities. Neurological: Alert and oriented to person   Skin:  Warm and dry.       Medications           Current Facility-Administered Medications   Medication Dose Route Frequency    potassium chloride SR (KLOR-CON 10) tablet 20 mEq  20 mEq Oral BID    amoxicillin-clavulanate (AUGMENTIN) 875-125 mg per tablet 1 Tab  1 Tab Oral BID WITH MEALS    amLODIPine (NORVASC) tablet 2.5 mg  2.5 mg Oral QHS    oxybutynin (DITROPAN) tablet 5 mg  5 mg Oral TID    pantoprazole (PROTONIX) tablet 40 mg  40 mg Oral ACB    0.45% sodium chloride infusion  100 mL/hr IntraVENous CONTINUOUS    acetaminophen (TYLENOL) tablet 650 mg  650 mg Oral Q6H PRN    ondansetron (ZOFRAN) injection 4 mg  4 mg IntraVENous Q8H PRN    metoprolol succinate (TOPROL-XL) XL tablet 50 mg  50 mg Oral DAILY    atorvastatin (LIPITOR) tablet 20 mg  20 mg Oral QHS    tamsulosin (FLOMAX) capsule 0.4 mg  0.4 mg Oral DAILY         Allergies         Codeine, Codeine, Ibuprofen, Morphine, and Morphine       Labs/Imaging/Diagnostics      Labs:  Recent Results (from the past 24 hour(s))   CBC W/O DIFF    Collection Time: 12/27/20  5:40 AM   Result Value Ref Range    WBC 8.8 4.4 - 11.3 K/uL    RBC 4.91 4.50 - 5.90 M/uL    HGB 14.2 13.5 - 17.5 g/dL    HCT 42.7 36 - 46 %    MCV 86.9 80 - 100 FL    MCH 28.9 (L) 31 - 34 PG    MCHC 33.3 31.0 - 36.0 g/dL    RDW 16.8 (H) 11.5 - 14.5 %    PLATELET 89 K/uL    MPV 10.5 6.5 - 11.5 FL    NRBC 0.0  WBC    ABSOLUTE NRBC 9.33 K/uL   METABOLIC PANEL, BASIC Collection Time: 12/27/20  5:40 AM   Result Value Ref Range    Sodium 138 136 - 145 mmol/L    Potassium 3.7 3.5 - 5.1 mmol/L    Chloride 106 97 - 108 mmol/L    CO2 28 21 - 32 mmol/L    Anion gap 4 (L) 5 - 15 mmol/L    Glucose 110 (H) 65 - 100 mg/dL    BUN 15 6 - 20 mg/dL    Creatinine 1.08 (H) 0.55 - 1.02 mg/dL    BUN/Creatinine ratio 14 12 - 20      GFR est AA 59 (L) >60 ml/min/1.73m2    GFR est non-AA 49 (L) >60 ml/min/1.73m2    Calcium 8.7 8.5 - 10.1 mg/dL        Trended key labs include:  Recent Labs     12/27/20  0540 12/26/20  0500 12/25/20  0617   WBC 8.8 8.6 6.2   HGB 14.2 15.0 14.7   HCT 42.7 44.4 44.4   PLT 89 95 84     Recent Labs     12/27/20  0540 12/26/20  0500 12/25/20  0625 12/25/20  0617    140  --  143   K 3.7 3.5  --  3.4*    107  --  109*   CO2 28 27  --  27   * 117*  --  104*   BUN 15 15  --  24*   CREA 1.08* 1.06*  --  1.14*   CA 8.7 9.4  --  9.6   MG  --   --  1.9  --        Imaging Last 24 Hours:  No results found. Assessment//Plan           Patient Active Problem List    Diagnosis Date Noted    Dementia ([facility] [address] 12/26/2020    Hypokalemia 12/25/2020    Thrombocytopenia ([address] 12/25/2020    Acute cystitis without hematuria 12/24/2020    Dehydration 12/23/2020    Elevated LFTs 01/30/2019    AAA (abdominal aortic aneurysm) ([address] 01/22/2019    Abdominal pain 01/22/2019    Hypertension 01/22/2019    Hydronephrosis 01/22/2019    Aortitis ([address] 07/15/2018    Vascular graft infection ([address] 07/15/2018        Dehydration  Likely secondary to poor oral intake  Given normal saline for gentle rehydration.     Pt improved and now very much awake though still not oriented  -12/25/2020 patient is eating and drinking as per nursing so we will continue to hold on restarting her IV.  -12/26/2020  BUN/crea continues to improve, continue to monitor  -12/27/2020 renal function stable continue IV fluids    Hypertension  Bps stable, continue amlodipine, HCTZ, metoprolol    Acute cystitis without hematuria  Currently on rocephin and awaiting urine culture  -12/25/2020 urine culture still pending but will change antibiotics to Augmentin  -12/26/20 sister reports that pt with chronic UTI and is on prophylactic abx that is being managed by   -12/27/2020 continue with Augmentin for UTI    Hypokalemia  -12/25/2020 potassium remains low at 3.4 on potassium chloride 20 mEq daily. Will increase to 20 mEq twice daily repeat level in the morning also include a mag level  -12/26/2020  Potassium normal, continue with supplements at 20 meq BID  -12/27/2020 potassium normal continue potassium chloride 20 equivalents twice daily    Thrombocytopenia ([address]  -12/25/2020 platelets decreased from previous we will hold Lovenox and repeat level in the morning  -12/26/2020 platelets improved from previous, continue to follow    Dementia ([address]  Spoke to pt's sister, Noe Jasso, who stated that it is only her and the patient in the family and pt has always refused to do a living well or power of . As per sister patient has been told many times before that she is not to live alone and at one point was in the nursing home in [facility] until about a year ago when she decided she was going to live with her sister but only did so for short time then moved back to [facility] and has been on her home. Patient is normally followed by Dr. Gaurang Borges. Patient's sister was informed that we will attempt to get patient placed in a nursing home because given her mentation she is not able to live alone as she is not oriented to time or place. -12/27/2020 patient still oriented only to person and will need placement since she is unable to care for herself given her mentation    Abdominal pain  She has been having tenderness to palpation of the abdomen so a x-ray was done of the abdomen which did not reveal any acute findings.                 Electronically signed by Jules Kramer MD, Leticia Running on 12/27/2020 at 10:20 AM

## 2020-12-27 NOTE — PROGRESS NOTES
Problem: Falls - Risk of  Goal: *Absence of Falls  Description: Document Ute Melissa Fall Risk and appropriate interventions in the flowsheet. Outcome: Progressing Towards Goal  Note: Fall Risk Interventions:  Mobility Interventions: Bed/chair exit alarm, Patient to call before getting OOB    Mentation Interventions: Bed/chair exit alarm    Medication Interventions: Bed/chair exit alarm    Elimination Interventions: Bed/chair exit alarm, Call light in reach, Stay With Me (per policy), Toilet paper/wipes in reach    History of Falls Interventions: Bed/chair exit alarm         Problem: Patient Education: Go to Patient Education Activity  Goal: Patient/Family Education  Outcome: Progressing Towards Goal     Problem: Fluid Volume - Risk of, Imbalanced  Goal: *Balanced intake and output  Outcome: Progressing Towards Goal     Problem: Pressure Injury - Risk of  Goal: *Prevention of pressure injury  Description: Document Bishnu Scale and appropriate interventions in the flowsheet.   Outcome: Progressing Towards Goal  Note: Pressure Injury Interventions:  Sensory Interventions: Assess changes in LOC    Moisture Interventions: Limit adult briefs    Activity Interventions: PT/OT evaluation    Mobility Interventions: PT/OT evaluation    Nutrition Interventions: Document food/fluid/supplement intake    Friction and Shear Interventions: Minimize layers                Problem: Patient Education: Go to Patient Education Activity  Goal: Patient/Family Education  Outcome: Progressing Towards Goal

## 2020-12-27 NOTE — PROGRESS NOTES
Patient is A&O to self only. Denies pain or SOB on room air. Repositioned in the bed. Bed in low position, bed alarm on as pt is high fall risk, and located in room beside nurses' station. Will continue to monitor.

## 2020-12-28 PROBLEM — N39.0 UTI (URINARY TRACT INFECTION): Status: ACTIVE | Noted: 2020-12-28

## 2020-12-28 LAB
ANION GAP SERPL CALC-SCNC: 10 MMOL/L (ref 5–15)
BUN SERPL-MCNC: 12 MG/DL (ref 6–20)
BUN/CREAT SERPL: 12 (ref 12–20)
CA-I BLD-MCNC: 9.1 MG/DL (ref 8.5–10.1)
CHLORIDE SERPL-SCNC: 105 MMOL/L (ref 97–108)
CO2 SERPL-SCNC: 25 MMOL/L (ref 21–32)
CREAT SERPL-MCNC: 1.01 MG/DL (ref 0.55–1.02)
ERYTHROCYTE [DISTWIDTH] IN BLOOD BY AUTOMATED COUNT: 17 % (ref 11.5–14.5)
GLUCOSE SERPL-MCNC: 97 MG/DL (ref 65–100)
HCT VFR BLD AUTO: 43.4 % (ref 36–46)
HGB BLD-MCNC: 14.3 G/DL (ref 13.5–17.5)
MCH RBC QN AUTO: 28.8 PG (ref 31–34)
MCHC RBC AUTO-ENTMCNC: 32.9 G/DL (ref 31–36)
MCV RBC AUTO: 87.4 FL (ref 80–100)
NRBC # BLD: 0.01 K/UL
NRBC BLD-RTO: 10 PER 100 WBC
PLATELET # BLD AUTO: 118 K/UL
PMV BLD AUTO: 10.7 FL (ref 6.5–11.5)
POTASSIUM SERPL-SCNC: 3.6 MMOL/L (ref 3.5–5.1)
RBC # BLD AUTO: 4.96 M/UL (ref 4.5–5.9)
SODIUM SERPL-SCNC: 140 MMOL/L (ref 136–145)
WBC # BLD AUTO: 7.4 K/UL (ref 4.4–11.3)

## 2020-12-28 PROCEDURE — 74011250637 HC RX REV CODE- 250/637: Performed by: INTERNAL MEDICINE

## 2020-12-28 PROCEDURE — 74011000258 HC RX REV CODE- 258: Performed by: NURSE PRACTITIONER

## 2020-12-28 PROCEDURE — 85027 COMPLETE CBC AUTOMATED: CPT

## 2020-12-28 PROCEDURE — 99218 HC RM OBSERVATION: CPT

## 2020-12-28 PROCEDURE — 65270000029 HC RM PRIVATE

## 2020-12-28 PROCEDURE — 97161 PT EVAL LOW COMPLEX 20 MIN: CPT

## 2020-12-28 PROCEDURE — 80048 BASIC METABOLIC PNL TOTAL CA: CPT

## 2020-12-28 PROCEDURE — 74011250637 HC RX REV CODE- 250/637: Performed by: NURSE PRACTITIONER

## 2020-12-28 PROCEDURE — 36415 COLL VENOUS BLD VENIPUNCTURE: CPT

## 2020-12-28 RX ADMIN — TAMSULOSIN HYDROCHLORIDE 0.4 MG: 0.4 CAPSULE ORAL at 09:23

## 2020-12-28 RX ADMIN — POTASSIUM CHLORIDE 20 MEQ: 750 TABLET, FILM COATED, EXTENDED RELEASE ORAL at 09:23

## 2020-12-28 RX ADMIN — OXYBUTYNIN CHLORIDE 5 MG: 5 TABLET ORAL at 21:20

## 2020-12-28 RX ADMIN — AMOXICILLIN AND CLAVULANATE POTASSIUM 1 TABLET: 875; 125 TABLET, FILM COATED ORAL at 16:42

## 2020-12-28 RX ADMIN — PANTOPRAZOLE SODIUM 40 MG: 40 TABLET, DELAYED RELEASE ORAL at 07:14

## 2020-12-28 RX ADMIN — AMOXICILLIN AND CLAVULANATE POTASSIUM 1 TABLET: 875; 125 TABLET, FILM COATED ORAL at 09:23

## 2020-12-28 RX ADMIN — ATORVASTATIN CALCIUM 20 MG: 20 TABLET, FILM COATED ORAL at 21:20

## 2020-12-28 RX ADMIN — OXYBUTYNIN CHLORIDE 5 MG: 5 TABLET ORAL at 16:42

## 2020-12-28 RX ADMIN — SODIUM CHLORIDE 100 ML/HR: 4.5 INJECTION, SOLUTION INTRAVENOUS at 15:22

## 2020-12-28 RX ADMIN — SODIUM CHLORIDE 100 ML/HR: 4.5 INJECTION, SOLUTION INTRAVENOUS at 07:14

## 2020-12-28 RX ADMIN — AMLODIPINE BESYLATE 2.5 MG: 2.5 TABLET ORAL at 21:20

## 2020-12-28 RX ADMIN — METOPROLOL SUCCINATE 50 MG: 50 TABLET, EXTENDED RELEASE ORAL at 09:23

## 2020-12-28 RX ADMIN — OXYBUTYNIN CHLORIDE 5 MG: 5 TABLET ORAL at 09:23

## 2020-12-28 RX ADMIN — POTASSIUM CHLORIDE 20 MEQ: 750 TABLET, FILM COATED, EXTENDED RELEASE ORAL at 16:43

## 2020-12-28 NOTE — PROGRESS NOTES
PHYSICAL THERAPY EVALUATION  Patient: Sharla Lazo (17 y.o. female)  Date: 12/28/2020  Primary Diagnosis: Dehydration [E86.0]  UTI (urinary tract infection) [N39.0]  Dementia ([facility] [F03.90]        Precautions: Fall       ASSESSMENT  Based on the objective data described below, the patient presents with decreased transfer ability, decreased gait endurance, and decreased activity tolerance. Pt is able to follow simple commands with functional tasks, but is unable to recall previous living situation and is only oriented to person. Pt was able to perform sit to stand transfer and ambulate 150' CGA. Continue to progress functionally as able. Current Level of Function Impacting Discharge (mobility/balance): functional mobility with transfers and gait, activity tolerance    Other factors to consider for discharge: Cognitive impairments     Patient will benefit from skilled therapy intervention to address the above noted impairments. PLAN :  Recommendations and Planned Interventions: bed mobility training, transfer training, gait training, and therapeutic exercises      Frequency/Duration: Patient will be followed by physical therapy:  5 times a week to address goals. Recommendation for discharge: (in order for the patient to meet his/her long term goals)  Physical therapy at least 2 days/week in the home     This discharge recommendation:  Has been made in collaboration with the attending provider and/or case management    IF patient discharges home will need the following DME: walker         SUBJECTIVE:   Patient stated I am having pain in my stomach.     OBJECTIVE DATA SUMMARY:   HISTORY:    Past Medical History:   Diagnosis Date    Abdominal aortic aneurysm ([facility]     Adverse effect of anesthesia 2017    hypoxia  with contual dementia    Adverse effect of anesthesia     BP flucuates    Aortitis (HCC)     Atrophic kidney     Chronic abdominal pain     Dementia (HCC)     Hydronephrosis Hypercholesterolemia     Hypertension     pt states she is unaware of HTN-states she \"takes metoprolol to regulate HR\"    Ill-defined condition     one episode of Bell's Palsy    Ill-defined condition late 2015/early 2016    GI bleed-required transfusions, transferred from [facility] to [address] thinks she may have had surgery, possible sm bowel resection    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[address]     subsequent encounter    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 (taking ATBX)    Non-nicotine vapor product user     Dimentia,   some confusions    OAB (overactive bladder)     Retained ureteral stent     Right leg weakness     Solitary kidney     Unsteady gait     Ureteral stone     Uses walker     sometimes    UTI (urinary tract infection)      Past Surgical History:   Procedure Laterality Date    BREAST SURGERY PROCEDURE UNLISTED  1970's    two surgeries to remove benign tumors from each breast    [facility]      states multiple surgeries to remove cysts from each breast    HX AAA REPAIR N/A 2007    HX AAA REPAIR N/A 2016    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     HX APPENDECTOMY      in 9th grade    HX BREAST LUMPECTOMY Left 5/10/2017    LEFT BREAST DUCTAL EXCISION performed by Marshall Jones MD at [address] [place] [facility]    HX CATARACT REMOVAL Bilateral 2015    HX GI N/A 07/19/2018    EGD with U/S    HX HERNIA REPAIR  2010    abd incisional hernia repair    HX HYSTERECTOMY      and one ovary removed    HX ORTHOPAEDIC Left 1992    foot surgery for fx heel    HX SMALL BOWEL RESECTION N/A Unknown    HX TONSILLECTOMY      HX UROLOGICAL Left 09/06/2018    Cystourethroscopy w/ indwelling ureteral stent insertion -- Dr Renetta Tejeda    HX UROLOGICAL  03/12/2019    Cysto, bilat RPG, left URS, holmium laser litho, stone extract, left JJ stent exchange. Dr. Todd Wick, [facility].     IR STENT PLACEMENT  11/2018    ureteral    VASCULAR SURGERY PROCEDURE UNLIST  05/23/2007    open AAA repair at [facility]       Personal factors and/or comorbidities impacting plan of care: Cognitive impairments    Home Situation  Home Environment: (Unable to obtain from pt due to cognitive deficits )  # Steps to Enter: 3  One/Two Story Residence: One story  Living Alone: Yes  Support Systems: Family member(s)  Patient Expects to be Discharged to[de-identified] Unknown  Current DME Used/Available at Home: None    EXAMINATION/PRESENTATION/DECISION MAKING:   Critical Behavior:  Neurologic State: Alert, Confused  Orientation Level: Oriented to person  Cognition: Memory loss     Hearing: Auditory  Auditory Impairment: Hard of hearing, bilateral    Functional Mobility:  Bed Mobility:  Rolling: Independent  Supine to Sit: Modified independent  Sit to Supine: Modified independent  Scooting: Independent  Transfers:  Sit to Stand: Contact guard assistance  Stand to Sit: Contact guard assistance        Balance:   Sitting: Intact  Ambulation/Gait Training:  Distance (ft): 150 Feet (ft)  Assistive Device: Walker  Ambulation - Level of Assistance: Contact guard assistance    Treatment Session:   Pt tolerated therapy session well. Pt is able to follow simple commands. Pt able to perform bed mobility independently. Pt able to perform sit to stand transfer and ambulate with FWW for 150' to door and back to bed with CGA.         Physical Therapy Evaluation Charge Determination   History Examination Presentation Decision-Making   LOW Complexity : Zero comorbidities / personal factors that will impact the outcome / POC LOW Complexity : 1-2 Standardized tests and measures addressing body structure, function, activity limitation and / or participation in recreation  LOW Complexity : Stable, uncomplicated        Based on the above components, the patient evaluation is determined to be of the following complexity level: LOW     Pain Ratin/10    Activity Tolerance:   Good and tolerates ADLs without rest breaks    After treatment patient left in no apparent distress:   Supine in bed, Call bell within reach, Bed / chair alarm activated, and Side rails x 3    COMMUNICATION/EDUCATION:   The patients plan of care was discussed with: Physician. Patient/family have participated as able in goal setting and plan of care. Thank you for this referral.  Danita Hein, PT, DPT   Time Calculation: 18 mins       Problem: Mobility Impaired (Adult and Pediatric)  Goal: *Acute Goals and Plan of Care (Insert Text)  Description: FUNCTIONAL STATUS PRIOR TO ADMISSION: Patient was independent and active without use of DME.    HOME SUPPORT PRIOR TO ADMISSION: The patient lived alone with no local support. Physical Therapy Goals  Initiated 12/28/2020  1. Patient will perform sit to stand with modified independence within 7 day(s). 2.  Patient will ambulate with modified independence for 500 feet with the least restrictive device within 7 day(s).      Outcome: Not Met

## 2020-12-28 NOTE — PROGRESS NOTES
Problem: Falls - Risk of  Goal: *Absence of Falls  Description: Document Pardeep Farrar Fall Risk and appropriate interventions in the flowsheet. Outcome: Progressing Towards Goal  Note: Fall Risk Interventions:  Mobility Interventions: Bed/chair exit alarm    Mentation Interventions: Bed/chair exit alarm, Reorient patient    Medication Interventions: Bed/chair exit alarm    Elimination Interventions: Bed/chair exit alarm    History of Falls Interventions: Bed/chair exit alarm, Room close to nurse's station         Problem: Patient Education: Go to Patient Education Activity  Goal: Patient/Family Education  Outcome: Progressing Towards Goal     Problem: Fluid Volume - Risk of, Imbalanced  Goal: *Balanced intake and output  Outcome: Progressing Towards Goal     Problem: Fluid Volume - Risk of, Imbalanced  Goal: *Balanced intake and output  Outcome: Progressing Towards Goal     Problem: Pressure Injury - Risk of  Goal: *Prevention of pressure injury  Description: Document Bishnu Scale and appropriate interventions in the flowsheet.   Outcome: Progressing Towards Goal  Note: Pressure Injury Interventions:  Sensory Interventions: Keep linens dry and wrinkle-free, Minimize linen layers    Moisture Interventions: Minimize layers    Activity Interventions: PT/OT evaluation    Mobility Interventions: PT/OT evaluation    Nutrition Interventions: Document food/fluid/supplement intake    Friction and Shear Interventions: Minimize layers

## 2020-12-28 NOTE — PROGRESS NOTES
Spiritual Care Assessment/Progress Note  [facility]      NAME: Teresa Hamilton      MRN: [id number]  AGE: 79 y.o.  SEX: female  Episcopalian Affiliation: Yarsanism   Language: English     12/28/2020     Total Time (in minutes): 10     Spiritual Assessment begun in [facility] [address] through conversation with:         [x]Patient        [] Family    [] Friend(s)        Reason for Consult: Initial/Spiritual assessment, patient floor     Spiritual beliefs: (Please include comment if needed)     [] Identifies with a zahira tradition:         [] Supported by a zahira community:            [] Claims no spiritual orientation:           [] Seeking spiritual identity:                [] Adheres to an individual form of spirituality:           [x] Not able to assess:                           Identified resources for coping:      [] Prayer                               [] Music                  [] Guided Imagery     [x] Family/friends                 [] Pet visits     [] Devotional reading                         [] Unknown     [] Other:                                              Interventions offered during this visit: (See comments for more details)    Patient Interventions: Affirmation of zahira, Catharsis/review of pertinent events in supportive environment, Coping skills reviewed/reinforced, Initial/Spiritual assessment, patient floor, Prayer (assurance of)           Plan of Care:     [] Support spiritual and/or cultural needs    [] Support AMD and/or advance care planning process      [] Support grieving process   [] Coordinate Rites and/or Rituals    [] Coordination with community clergy   [] No spiritual needs identified at this time   [] Detailed Plan of Care below (See Comments)  [] Make referral to Music Therapy  [] Make referral to Pet Therapy     [] Make referral to Addiction services  [] Make referral to [facility]  [] Make referral to Spiritual Care Partner  [] No future visits requested        [x] Follow up upon further referrals     Comments: The purpose of the visit was to do a spiritual assessment on the patient. The patient shared that she had tow sisters who she shared about pleasantly. She expressed having fund memories about her childhood. She shared other stories about her life that appeared to be joyful to her. The patient to be confused however she was engaging, happy, and pleasant. The  provided the ministry of presence and the comfort of spiritual care. [facility] Mariano Wilkes.    can be reached by calling the  at [facility]  [phone]

## 2020-12-28 NOTE — PROGRESS NOTES
Patient keeps removing telemetry leads. After several attempts to attach tele box and hide it from patient, this became impossible. As patient is here with dehydration and UTI, Zonia Magdaleno discontinued order for telemetry. Will continue to monitor.

## 2020-12-28 NOTE — PROGRESS NOTES
Was able to reach patient's sister via telephone. Patient's sister reports that patient lives alone and has a friend that checks in with her at least once a week. However, patient's sister reports the patient has no other family nearby.

## 2020-12-28 NOTE — PROGRESS NOTES
Multiple attempts made to reach patient's sister, Eduardo Bundy, with no success. Patient's primary nurse reports having no other contact information.

## 2020-12-28 NOTE — PROGRESS NOTES
Progress Note  Date:12/28/2020       Room:[facility]  Patient Lidia Ridley     YOB: 1940     Age:80 y.o. Subjective    Patient is a 61-year-old female with history of hypertension, urinary incontinence and GERD who was brought into the emergency department this afternoon by EMS after the patient was found on the floor at home by her neighbor. Kathie Alvarez is a poor historian and didn't know for how long she was on the floor and power of  (Siobhansister at [phone]) [facility] could not be reached. This morning pt was awake and oriented to person but not to place or time and and very pleasant without any complaints nor does seem like she has some discomfort with deep palpation of her abdomen.  Patient removed her IV yesterday so has been getting IM antibiotics and drinking fluids and eating.  Patient lives at home alone and does not seem to be able to take care of herself given the confusion     Patient seen and evaluated awake alert but not oriented unable to answer many my questions is currently being treated for UTI. Has been issue with patient discharge as previously she was living on her own but with her dementia patient is not safe and case management has been working with patient sister about determining safe discharge planning    Review of Systems   Constitutional: Negative for activity change, appetite change, chills and fatigue. Cardiovascular: Negative for leg swelling. Gastrointestinal: Negative for abdominal distention, blood in stool, diarrhea, nausea and vomiting. Psychiatric/Behavioral: Positive for confusion. Negative for agitation and behavioral problems.        Objective           Vitals Last 24 Hours:  Patient Vitals for the past 24 hrs:   Temp Pulse Resp BP SpO2   12/28/20 1658 98.1 °F (36.7 °C) 74 18 130/76 99 %   12/28/20 1136 97.9 °F (36.6 °C) 74 18 131/66 98 %   12/28/20 0723 97.6 °F (36.4 °C) 81 18 124/61 95 %   12/28/20 0550 98.1 °F (36.7 °C) 85 18 119/63 95 % 12/28/20 0000 98.2 °F (36.8 °C) 75 18 135/73 96 %   12/27/20 2000 98.6 °F (37 °C) 79 18 (!) 131/58 94 %        I/O (24Hr): No intake or output data in the 24 hours ending 12/28/20 1859    Physical Exam:  General Appearance:  Comfortable, well-appearing. No acute distress. HEENT: NCAT, EOMI, No deformities or discharge, Neck supple with trachea midline. Respiratory: Normal respiratory rate. Breath sounds clear to auscultation. Heart: S1 normal and S2 normal.  No tachycardia  Abdomen: Soft and flat. Bowel sounds are normal. No rebound or guarding. tenderness to palpation mid to lower abdomen. Extremities: Normal range of motion. No acute deformities. Neurological: Alert and oriented to person   Skin:  Warm and dry.       Medications           Current Facility-Administered Medications   Medication Dose Route Frequency    potassium chloride SR (KLOR-CON 10) tablet 20 mEq  20 mEq Oral BID    amoxicillin-clavulanate (AUGMENTIN) 875-125 mg per tablet 1 Tab  1 Tab Oral BID WITH MEALS    amLODIPine (NORVASC) tablet 2.5 mg  2.5 mg Oral QHS    oxybutynin (DITROPAN) tablet 5 mg  5 mg Oral TID    pantoprazole (PROTONIX) tablet 40 mg  40 mg Oral ACB    0.45% sodium chloride infusion  100 mL/hr IntraVENous CONTINUOUS    acetaminophen (TYLENOL) tablet 650 mg  650 mg Oral Q6H PRN    ondansetron (ZOFRAN) injection 4 mg  4 mg IntraVENous Q8H PRN    metoprolol succinate (TOPROL-XL) XL tablet 50 mg  50 mg Oral DAILY    atorvastatin (LIPITOR) tablet 20 mg  20 mg Oral QHS    tamsulosin (FLOMAX) capsule 0.4 mg  0.4 mg Oral DAILY         Allergies         Codeine, Codeine, Ibuprofen, Morphine, and Morphine       Labs/Imaging/Diagnostics      Labs:  Recent Results (from the past 24 hour(s))   CBC W/O DIFF    Collection Time: 12/28/20  5:42 AM   Result Value Ref Range    WBC 7.4 4.4 - 11.3 K/uL    RBC 4.96 4.50 - 5.90 M/uL    HGB 14.3 13.5 - 17.5 g/dL    HCT 43.4 36 - 46 %    MCV 87.4 80 - 100 FL    MCH 28.8 (L) 31 - 34 PG    MCHC 32.9 31.0 - 36.0 g/dL    RDW 17.0 (H) 11.5 - 14.5 %    PLATELET 047 K/uL    MPV 10.7 6.5 - 11.5 FL    NRBC 10.0  WBC    ABSOLUTE NRBC 9.83 K/uL   METABOLIC PANEL, BASIC    Collection Time: 12/28/20  5:42 AM   Result Value Ref Range    Sodium 140 136 - 145 mmol/L    Potassium 3.6 3.5 - 5.1 mmol/L    Chloride 105 97 - 108 mmol/L    CO2 25 21 - 32 mmol/L    Anion gap 10 5 - 15 mmol/L    Glucose 97 65 - 100 mg/dL    BUN 12 6 - 20 mg/dL    Creatinine 1.01 0.55 - 1.02 mg/dL    BUN/Creatinine ratio 12 12 - 20      GFR est AA >60 >60 ml/min/1.73m2    GFR est non-AA 53 (L) >60 ml/min/1.73m2    Calcium 9.1 8.5 - 10.1 mg/dL        Trended key labs include:  Recent Labs     12/28/20  0542 12/27/20  0540 12/26/20  0500   WBC 7.4 8.8 8.6   HGB 14.3 14.2 15.0   HCT 43.4 42.7 44.4    89 95     Recent Labs     12/28/20  0542 12/27/20  0540 12/26/20  0500    138 140   K 3.6 3.7 3.5    106 107   CO2 25 28 27   GLU 97 110* 117*   BUN 12 15 15   CREA 1.01 1.08* 1.06*   CA 9.1 8.7 9.4       Imaging Last 24 Hours:  No results found. Assessment//Plan           Patient Active Problem List    Diagnosis Date Noted    UTI (urinary tract infection) 12/28/2020    Dementia ([facility] [address] 12/26/2020    Hypokalemia 12/25/2020    Thrombocytopenia ([facility] [address] 12/25/2020    Acute cystitis without hematuria 12/24/2020    Dehydration 12/23/2020    Elevated LFTs 01/30/2019    AAA (abdominal aortic aneurysm) ([address] 01/22/2019    Abdominal pain 01/22/2019    Hypertension 01/22/2019    Hydronephrosis 01/22/2019    Aortitis ([facility] [address] 07/15/2018    Vascular graft infection ([facility] [address] [address] 07/15/2018        Dehydration  Likely secondary to poor oral intake  Given normal saline for gentle rehydration.     Pt improved and now very much awake though still not oriented  -12/25/2020 patient is eating and drinking as per nursing so we will continue to hold on restarting her IV.  -12/26/2020  BUN/crea continues to improve, continue to monitor  -12/27/2020 renal function stable continue IV fluids    Hypertension  Bps stable, continue amlodipine, HCTZ, metoprolol    Acute cystitis without hematuria  Currently on rocephin and awaiting urine culture  -12/25/2020 urine culture still pending but will change antibiotics to Augmentin  -12/26/20 sister reports that pt with chronic UTI and is on prophylactic abx that is being managed by   -12/27/2020 continue with Augmentin for UTI  -12/28 tolerating well he is afebrile    Hypokalemia  -12/25/2020 potassium remains low at 3.4 on potassium chloride 20 mEq daily. Will increase to 20 mEq twice daily repeat level in the morning also include a mag level  -12/26/2020  Potassium normal, continue with supplements at 20 meq BID  -12/27/2020 potassium normal continue potassium chloride 20 equivalents twice daily  -12/28 stable sinew current supplementation    Thrombocytopenia ([address]  -12/25/2020 platelets decreased from previous we will hold Lovenox and repeat level in the morning  -12/26/2020 platelets improved from previous, continue to follow  -12/28 patient's platelet levels back in normal range    Dementia ([address]  Spoke to pt's sister, Esther Reynoso, who stated that it is only her and the patient in the family and pt has always refused to do a living well or power of . As per sister patient has been told many times before that she is not to live alone and at one point was in the nursing home in [place] until about a year ago when she decided she was going to live with her sister but only did so for short time then moved back to [facility] and has been on her home. Patient is normally followed by Dr. Seven Gibbs. Patient's sister was informed that we will attempt to get patient placed in a nursing home because given her mentation she is not able to live alone as she is not oriented to time or place.   -12/27/2020 patient still oriented only to person and will need placement since she is unable to care for herself given her mentation  -12/28 extensive discussion by case management with patient's family and it was noted that patient's dementia prevents her from living on her own previously was living at the nursing home at that time was noted to have acute kidney sepsis PEG. Patient left nursing home and initially lived with her sister but at this time is unable to take care of her as it is noted that patient has worsening dementia    Abdominal pain  She has been having tenderness to palpation of the abdomen so a x-ray was done of the abdomen which did not reveal any acute findings. With patient's inability to discharge safely will convert patient to an inpatient status and continue to monitor closely    Spent 30 minutes evaluting and coordinating patient care of which >50% was spent coordinating and counseling.

## 2020-12-29 PROCEDURE — 87635 SARS-COV-2 COVID-19 AMP PRB: CPT

## 2020-12-29 PROCEDURE — 97110 THERAPEUTIC EXERCISES: CPT

## 2020-12-29 PROCEDURE — 74011250637 HC RX REV CODE- 250/637: Performed by: NURSE PRACTITIONER

## 2020-12-29 PROCEDURE — 74011250637 HC RX REV CODE- 250/637: Performed by: INTERNAL MEDICINE

## 2020-12-29 PROCEDURE — 65270000029 HC RM PRIVATE

## 2020-12-29 PROCEDURE — 74011000258 HC RX REV CODE- 258: Performed by: NURSE PRACTITIONER

## 2020-12-29 RX ADMIN — AMLODIPINE BESYLATE 2.5 MG: 2.5 TABLET ORAL at 21:27

## 2020-12-29 RX ADMIN — OXYBUTYNIN CHLORIDE 5 MG: 5 TABLET ORAL at 09:10

## 2020-12-29 RX ADMIN — OXYBUTYNIN CHLORIDE 5 MG: 5 TABLET ORAL at 21:27

## 2020-12-29 RX ADMIN — OXYBUTYNIN CHLORIDE 5 MG: 5 TABLET ORAL at 18:02

## 2020-12-29 RX ADMIN — POTASSIUM CHLORIDE 20 MEQ: 750 TABLET, FILM COATED, EXTENDED RELEASE ORAL at 17:58

## 2020-12-29 RX ADMIN — ATORVASTATIN CALCIUM 20 MG: 20 TABLET, FILM COATED ORAL at 21:27

## 2020-12-29 RX ADMIN — PANTOPRAZOLE SODIUM 40 MG: 40 TABLET, DELAYED RELEASE ORAL at 07:23

## 2020-12-29 RX ADMIN — POTASSIUM CHLORIDE 20 MEQ: 750 TABLET, FILM COATED, EXTENDED RELEASE ORAL at 09:09

## 2020-12-29 RX ADMIN — AMOXICILLIN AND CLAVULANATE POTASSIUM 1 TABLET: 875; 125 TABLET, FILM COATED ORAL at 17:58

## 2020-12-29 RX ADMIN — TAMSULOSIN HYDROCHLORIDE 0.4 MG: 0.4 CAPSULE ORAL at 09:09

## 2020-12-29 RX ADMIN — SODIUM CHLORIDE 100 ML/HR: 4.5 INJECTION, SOLUTION INTRAVENOUS at 15:20

## 2020-12-29 RX ADMIN — AMOXICILLIN AND CLAVULANATE POTASSIUM 1 TABLET: 875; 125 TABLET, FILM COATED ORAL at 09:09

## 2020-12-29 NOTE — ROUTINE PROCESS
Pt alert and confused, self transferred several times, attempting to use the commode. No other c/o. Fall precautions maintained.

## 2020-12-29 NOTE — PROGRESS NOTES
PHYSICAL THERAPY TREATMENT  Patient: Juwan Sweet (48 y.o. female)  Date: 12/29/2020  Diagnosis: Dehydration [E86.0]  UTI (urinary tract infection) [N39.0]  Dementia ([facility] [address] [F03.90] <principal problem not specified>       Precautions:    Chart, physical therapy assessment, plan of care and goals were reviewed. ASSESSMENT  Patient continues with skilled PT services and is progressing towards goals. Current Level of Function Impacting Discharge (mobility/balance): Assistance required due to mental status  Other factors to consider for discharge: Assistance required at home         PLAN :  Patient continues to benefit from skilled intervention to address the above impairments. Continue treatment per established plan of care. to address goals. Recommendation for discharge: (in order for the patient to meet his/her long term goals)  Physical therapy at least 2 days/week in the home     This discharge recommendation:  Has not yet been discussed the attending provider and/or case management    IF patient discharges home will need the following DME: none       SUBJECTIVE:   Patient stated that she was doing fine. OBJECTIVE DATA SUMMARY:   Critical Behavior:  Neurologic State: Alert, Confused  Orientation Level: Oriented to person  Cognition: Memory loss     Functional Mobility Training:  Bed Mobility:  Rolling: Independent  Supine to Sit: Independent  Sit to Supine: Modified independent  Scooting: Independent        Transfers:  Sit to Stand: Contact guard assistance  Stand to Sit: Contact guard assistance                             Balance:  Sitting: Intact(intact) Good  Ambulation/Gait Training:                                                      none  Stairs:              Treatment Session: Active range of motion exercises performed today with demonstration for the upper and lower extremities.     Pain Rating:  none    Activity Tolerance:   Fair  Please refer to the flowsheet for vital signs taken during this treatment.     After treatment patient left in no apparent distress:   Supine in bed    COMMUNICATION/COLLABORATION:   The patients plan of care was discussed with: Physical therapist.     Poncho Arrieta PT   Time Calculation: 10 mins

## 2020-12-29 NOTE — PROGRESS NOTES
Patient was sitting up in bed. Completed her breakfast. Patient was able to perform active exercises with verbal commands and demonstration for upper and lower extremities.

## 2020-12-29 NOTE — PROGRESS NOTES
Problem: Mobility Impaired (Adult and Pediatric)  Goal: *Acute Goals and Plan of Care (Insert Text)  Description: FUNCTIONAL STATUS PRIOR TO ADMISSION: Patient was independent and active without use of DME.    HOME SUPPORT PRIOR TO ADMISSION: The patient lived alone with no local support. Physical Therapy Goals  Initiated 12/28/2020  1. Patient will perform sit to stand with modified independence within 7 day(s). 2.  Patient will ambulate with modified independence for 500 feet with the least restrictive device within 7 day(s).      Outcome: Progressing Towards Goal

## 2020-12-29 NOTE — PROGRESS NOTES
Progress Note  Date:12/29/2020       Room:[facility]  Patient Kristie Martin     YOB: 1940     Age:80 y.o. Subjective    Patient is a 69-year-old female with history of hypertension, urinary incontinence and GERD who was brought into the emergency department this afternoon by EMS after the patient was found on the floor at home by her neighbor. Jackyanthony Valentine is a poor historian and didn't know for how long she was on the floor and power of  (Siobhansister at [phone]) FJP could not be reached. This morning pt was awake and oriented to person but not to place or time and and very pleasant without any complaints nor does seem like she has some discomfort with deep palpation of her abdomen.  Patient removed her IV yesterday so has been getting IM antibiotics and drinking fluids and eating.  Patient lives at home alone and does not seem to be able to take care of herself given the confusion     Patient seen and evaluated awake alert but not oriented patient had no complaints continues to wait for placement secondary to unsafe at home being on her own    Review of Systems   Constitutional: Negative for activity change, appetite change, chills and fatigue. Cardiovascular: Negative for leg swelling. Gastrointestinal: Negative for abdominal distention, blood in stool, diarrhea, nausea and vomiting. Psychiatric/Behavioral: Positive for confusion. Negative for agitation and behavioral problems. Objective           Vitals Last 24 Hours:  Patient Vitals for the past 24 hrs:   Temp Pulse Resp BP SpO2   12/29/20 1557 97.5 °F (36.4 °C) 67 20 139/83 96 %   12/29/20 1232 97 °F (36.1 °C) 79 19 (!) 140/85 93 %   12/29/20 0721 97.5 °F (36.4 °C) 62 18 125/62 98 %   12/29/20 0422 97.5 °F (36.4 °C) 69 18 (!) 118/51 96 %   12/29/20 0002 98.3 °F (36.8 °C) 60 18 (!) 135/58 92 %   12/28/20 1915 98 °F (36.7 °C) 62 18 (!) 122/55 94 %        I/O (24Hr):   No intake or output data in the 24 hours ending 12/29/20 1845    Physical Exam:  General Appearance:  Comfortable, well-appearing. No acute distress. HEENT: NCAT, EOMI, No deformities or discharge, Neck supple with trachea midline. Respiratory: Normal respiratory rate. Breath sounds clear to auscultation. Heart: S1 normal and S2 normal.  No tachycardia  Abdomen: Soft and flat. Bowel sounds are normal. No rebound or guarding. tenderness to palpation mid to lower abdomen. Extremities: Normal range of motion. No acute deformities. Neurological: Alert and oriented to person   Skin:  Warm and dry. Medications           Current Facility-Administered Medications   Medication Dose Route Frequency    potassium chloride SR (KLOR-CON 10) tablet 20 mEq  20 mEq Oral BID    amoxicillin-clavulanate (AUGMENTIN) 875-125 mg per tablet 1 Tab  1 Tab Oral BID WITH MEALS    amLODIPine (NORVASC) tablet 2.5 mg  2.5 mg Oral QHS    oxybutynin (DITROPAN) tablet 5 mg  5 mg Oral TID    pantoprazole (PROTONIX) tablet 40 mg  40 mg Oral ACB    0.45% sodium chloride infusion  100 mL/hr IntraVENous CONTINUOUS    acetaminophen (TYLENOL) tablet 650 mg  650 mg Oral Q6H PRN    ondansetron (ZOFRAN) injection 4 mg  4 mg IntraVENous Q8H PRN    metoprolol succinate (TOPROL-XL) XL tablet 50 mg  50 mg Oral DAILY    atorvastatin (LIPITOR) tablet 20 mg  20 mg Oral QHS    tamsulosin (FLOMAX) capsule 0.4 mg  0.4 mg Oral DAILY         Allergies         Codeine, Codeine, Ibuprofen, Morphine, and Morphine       Labs/Imaging/Diagnostics      Labs:  No results found for this or any previous visit (from the past 24 hour(s)). Trended key labs include:  Recent Labs     12/28/20  0542 12/27/20  0540   WBC 7.4 8.8   HGB 14.3 14.2   HCT 43.4 42.7    89     Recent Labs     12/28/20  0542 12/27/20  0540    138   K 3.6 3.7    106   CO2 25 28   GLU 97 110*   BUN 12 15   CREA 1.01 1.08*   CA 9.1 8.7       Imaging Last 24 Hours:  No results found.       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 Pt improved and now very much awake though still not oriented  -12/25/2020 patient is eating and drinking as per nursing so we will continue to hold on restarting her IV.  -12/26/2020  BUN/crea continues to improve, continue to monitor  -12/27/2020 renal function stable continue IV fluids  -12/29: DC IV fluids    Hypertension  Bps stable, continue amlodipine, HCTZ, metoprolol    Acute cystitis without hematuria  Currently on rocephin and awaiting urine culture  -12/25/2020 urine culture still pending but will change antibiotics to Augmentin  -12/26/20 sister reports that pt with chronic UTI and is on prophylactic abx that is being managed by   -12/27/2020 continue with Augmentin for UTI  -12/28 tolerating well he is afebrile    Hypokalemia  -12/25/2020 potassium remains low at 3.4 on potassium chloride 20 mEq daily.   Will increase to 20 mEq twice daily repeat level in the morning also include a mag level  -12/26/2020  Potassium normal, continue with supplements at 20 meq BID  -12/27/2020 potassium normal continue potassium chloride 20 equivalents twice daily  -12/28 stable sinew current supplementation    Thrombocytopenia ([address]  -12/25/2020 platelets decreased from previous we will hold Lovenox and repeat level in the morning  -12/26/2020 platelets improved from previous, continue to follow  -12/28 patient's platelet levels back in normal range    Dementia (HCC)  Spoke to pt's sister, Iza Maki, who stated that it is only her and the patient in the family and pt has always refused to do a living well or power of . As per sister patient has been told many times before that she is not to live alone and at one point was in the nursing home in [place] until about a year ago when she decided she was going to live with her sister but only did so for short time then moved back to [facility] and has been on her home. Patient is normally followed by Dr. [facility]. Patient's sister was informed that we will attempt to get patient placed in a nursing home because given her mentation she is not able to live alone as she is not oriented to time or place. -12/27/2020 patient still oriented only to person and will need placement since she is unable to care for herself given her mentation  -12/28 extensive discussion by case management with patient's family and it was noted that patient's dementia prevents her from living on her own previously was living at the nursing home at that time was noted to have acute kidney sepsis PEG. Patient left nursing home and initially lived with her sister but at this time is unable to take care of her as it is noted that patient has worsening dementia    Abdominal pain  She has been having tenderness to palpation of the abdomen so a x-ray was done of the abdomen which did not reveal any acute findings. Patient will continue to be monitored while awaiting for appropriate safe discharge plan    Spent 25 minutes evaluting and coordinating patient care of which >50% was spent coordinating and counseling.

## 2020-12-30 ENCOUNTER — APPOINTMENT (OUTPATIENT)
Dept: CT IMAGING | Age: 80
DRG: 884 | End: 2020-12-30
Attending: NURSE PRACTITIONER
Payer: MEDICARE

## 2020-12-30 PROCEDURE — 65270000029 HC RM PRIVATE

## 2020-12-30 PROCEDURE — 74011250637 HC RX REV CODE- 250/637: Performed by: NURSE PRACTITIONER

## 2020-12-30 PROCEDURE — 70450 CT HEAD/BRAIN W/O DYE: CPT

## 2020-12-30 PROCEDURE — 74011250637 HC RX REV CODE- 250/637: Performed by: INTERNAL MEDICINE

## 2020-12-30 PROCEDURE — 97530 THERAPEUTIC ACTIVITIES: CPT

## 2020-12-30 RX ADMIN — OXYBUTYNIN CHLORIDE 5 MG: 5 TABLET ORAL at 17:18

## 2020-12-30 RX ADMIN — OXYBUTYNIN CHLORIDE 5 MG: 5 TABLET ORAL at 08:26

## 2020-12-30 RX ADMIN — METOPROLOL SUCCINATE 50 MG: 50 TABLET, EXTENDED RELEASE ORAL at 08:26

## 2020-12-30 RX ADMIN — ATORVASTATIN CALCIUM 20 MG: 20 TABLET, FILM COATED ORAL at 21:58

## 2020-12-30 RX ADMIN — AMOXICILLIN AND CLAVULANATE POTASSIUM 1 TABLET: 875; 125 TABLET, FILM COATED ORAL at 17:17

## 2020-12-30 RX ADMIN — POTASSIUM CHLORIDE 20 MEQ: 750 TABLET, FILM COATED, EXTENDED RELEASE ORAL at 17:17

## 2020-12-30 RX ADMIN — TAMSULOSIN HYDROCHLORIDE 0.4 MG: 0.4 CAPSULE ORAL at 08:26

## 2020-12-30 RX ADMIN — AMOXICILLIN AND CLAVULANATE POTASSIUM 1 TABLET: 875; 125 TABLET, FILM COATED ORAL at 08:26

## 2020-12-30 RX ADMIN — OXYBUTYNIN CHLORIDE 5 MG: 5 TABLET ORAL at 21:58

## 2020-12-30 RX ADMIN — PANTOPRAZOLE SODIUM 40 MG: 40 TABLET, DELAYED RELEASE ORAL at 08:26

## 2020-12-30 RX ADMIN — AMLODIPINE BESYLATE 2.5 MG: 2.5 TABLET ORAL at 21:58

## 2020-12-30 RX ADMIN — POTASSIUM CHLORIDE 20 MEQ: 750 TABLET, FILM COATED, EXTENDED RELEASE ORAL at 08:26

## 2020-12-30 NOTE — PROGRESS NOTES
Progress Note  Date:12/30/2020       Room:[facility]  Patient Calljoselyn Custard     YOB: 1940     Age:80 y.o. Subjective    Patient is a 67-year-old female with history of hypertension, urinary incontinence and GERD who was brought into the emergency department this afternoon by EMS after the patient was found on the floor at home by her neighbor. Katrina Escobar is a poor historian and didn't know for how long she was on the floor and power of  (Siobhansister at [phone]) WXK could not be reached. This morning pt was awake and oriented to person but not to place or time and and very pleasant without any complaints nor does seem like she has some discomfort with deep palpation of her abdomen.  Patient removed her IV yesterday so has been getting IM antibiotics and drinking fluids and eating.  Patient lives at home alone and does not seem to be able to take care of herself given the confusion     Patient seen and evaluated awake alert but not oriented patient had no complaints continues to wait for placement secondary to unsafe at home being on her own. Patient has been attempted to get out of bed at times. Review of Systems   Constitutional: Negative for activity change, appetite change, chills and fatigue. Cardiovascular: Negative for leg swelling. Gastrointestinal: Negative for abdominal distention, blood in stool, diarrhea, nausea and vomiting. Psychiatric/Behavioral: Positive for confusion. Negative for agitation and behavioral problems.        Objective           Vitals Last 24 Hours:  Patient Vitals for the past 24 hrs:   Temp Pulse Resp BP SpO2   12/30/20 1550 97.8 °F (36.6 °C) 76 20 (!) 93/49 95 %   12/30/20 1150 97.3 °F (36.3 °C) 85 20 100/61 95 %   12/30/20 0717 97.6 °F (36.4 °C) 87 20 107/66 95 %   12/30/20 0434 96.8 °F (36 °C) 98 18 108/65 95 %   12/29/20 2351 97.5 °F (36.4 °C) 77 18 119/76 96 %   12/29/20 1949 98.1 °F (36.7 °C) 63 18 (!) 164/97 90 %        I/O (24Hr): No intake or output data in the 24 hours ending 12/30/20 1607    Physical Exam:  General Appearance:  Comfortable, well-appearing. No acute distress. HEENT: NCAT, EOMI, No deformities or discharge, Neck supple with trachea midline. Respiratory: Normal respiratory rate. Breath sounds clear to auscultation. Heart: S1 normal and S2 normal.  No tachycardia  Abdomen: Soft and flat. Bowel sounds are normal. No rebound or guarding. tenderness to palpation mid to lower abdomen. Extremities: Normal range of motion. No acute deformities. Neurological: Alert and oriented to person   Skin:  Warm and dry. Medications           Current Facility-Administered Medications   Medication Dose Route Frequency    potassium chloride SR (KLOR-CON 10) tablet 20 mEq  20 mEq Oral BID    amoxicillin-clavulanate (AUGMENTIN) 875-125 mg per tablet 1 Tab  1 Tab Oral BID WITH MEALS    amLODIPine (NORVASC) tablet 2.5 mg  2.5 mg Oral QHS    oxybutynin (DITROPAN) tablet 5 mg  5 mg Oral TID    pantoprazole (PROTONIX) tablet 40 mg  40 mg Oral ACB    acetaminophen (TYLENOL) tablet 650 mg  650 mg Oral Q6H PRN    ondansetron (ZOFRAN) injection 4 mg  4 mg IntraVENous Q8H PRN    metoprolol succinate (TOPROL-XL) XL tablet 50 mg  50 mg Oral DAILY    atorvastatin (LIPITOR) tablet 20 mg  20 mg Oral QHS    tamsulosin (FLOMAX) capsule 0.4 mg  0.4 mg Oral DAILY         Allergies         Codeine, Codeine, Ibuprofen, Morphine, and Morphine       Labs/Imaging/Diagnostics      Labs:  No results found for this or any previous visit (from the past 24 hour(s)). Trended key labs include:  Recent Labs     12/28/20  0542   WBC 7.4   HGB 14.3   HCT 43.4        Recent Labs     12/28/20  0542      K 3.6      CO2 25   GLU 97   BUN 12   CREA 1.01   CA 9.1       Imaging Last 24 Hours:  No results found.       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 Pt improved and now very much awake though still not oriented  -12/25/2020 patient is eating and drinking as per nursing so we will continue to hold on restarting her IV.  -12/26/2020  BUN/crea continues to improve, continue to monitor  -12/27/2020 renal function stable continue IV fluids  -12/29: DC IV fluids    Hypertension  Bps stable, continue amlodipine, HCTZ, metoprolol    Acute cystitis without hematuria  Currently on rocephin and awaiting urine culture  -12/25/2020 urine culture still pending but will change antibiotics to Augmentin  -12/26/20 sister reports that pt with chronic UTI and is on prophylactic abx that is being managed by   -12/27/2020 continue with Augmentin for UTI  -12/28 tolerating well he is afebrile    Hypokalemia  -12/25/2020 potassium remains low at 3.4 on potassium chloride 20 mEq daily.   Will increase to 20 mEq twice daily repeat level in the morning also include a mag level  -12/26/2020  Potassium normal, continue with supplements at 20 meq BID  -12/27/2020 potassium normal continue potassium chloride 20 equivalents twice daily  -12/28 stable sinew current supplementation    Thrombocytopenia ([address]  -12/25/2020 platelets decreased from previous we will hold Lovenox and repeat level in the morning  -12/26/2020 platelets improved from previous, continue to follow  -12/28 patient's platelet levels back in normal range    Dementia ([address]  Spoke to pt's sister, Johnny, who stated that it is only her and the patient in the family and pt has always refused to do a living well or power of . As per sister patient has been told many times before that she is not to live alone and at one point was in the nursing home in [place] until about a year ago when she decided she was going to live with her sister but only did so for short time then moved back to [facility] and has been on her home. Patient is normally followed by Dr. Kris Austin. Patient's sister was informed that we will attempt to get patient placed in a nursing home because given her mentation she is not able to live alone as she is not oriented to time or place. -12/27/2020 patient still oriented only to person and will need placement since she is unable to care for herself given her mentation  -12/28 extensive discussion by case management with patient's family and it was noted that patient's dementia prevents her from living on her own previously was living at the nursing home at that time was noted to have acute kidney sepsis PEG. Patient left nursing home and initially lived with her sister but at this time is unable to take care of her as it is noted that patient has worsening dementia    Abdominal pain  She has been having tenderness to palpation of the abdomen so a x-ray was done of the abdomen which did not reveal any acute findings. Patient will continue to be monitored while awaiting for appropriate safe discharge plan    Spent 25 minutes evaluting and coordinating patient care of which >50% was spent coordinating and counseling.

## 2020-12-30 NOTE — PROGRESS NOTES
PHYSICAL THERAPY TREATMENT  Patient: Zuleima Kay (38 y.o. female)  Date: 12/30/2020  Diagnosis: Dehydration [E86.0]  UTI (urinary tract infection) [N39.0]  Dementia ([address] [F03.90] <principal problem not specified>       Precautions: Fall Risk   Chart, physical therapy assessment, plan of care and goals were reviewed. ASSESSMENT  Patient continues with skilled PT services and is progressing towards goals. Pt is steadily progressing with functional mobility. Pt was able to perform sit to stand transfer under supervision. Pt was also able to ambulate within room for approx. 150' with FWW with CGA. Pt able to independently don/doff socks. Current Level of Function Impacting Discharge (mobility/balance): decreased functional mobility and activity tolerance. PLAN :  Patient continues to benefit from skilled intervention to address the above impairments. Continue treatment per established plan of care. to address goals. Recommendation for discharge: (in order for the patient to meet his/her long term goals)  Physical therapy at least 2 days/week in the home     This discharge recommendation:  Has been made in collaboration with the attending provider and/or case management    IF patient discharges home will need the following DME: walker       SUBJECTIVE:   Patient stated I finished my breakfast.    OBJECTIVE DATA SUMMARY:   Critical Behavior:  Neurologic State: Alert, Confused  Orientation Level: Disoriented to person  Cognition: Memory loss     Functional Mobility Training:  Bed Mobility:  Rolling: Independent  Supine to Sit: Independent  Sit to Supine: Independent  Scooting: Independent    Transfers:  Sit to Stand: Supervision  Stand to Sit: Supervision        Balance:  Sitting: Intact  Ambulation/Gait Training:  Distance (ft): 150 Feet (ft)  Assistive Device: Walker  Ambulation - Level of Assistance: Modified independent     Treatment Session:   Pt able to don/doff socks independently.     Pain Ratin/10    Activity Tolerance:   Fair and tolerates ADLs without rest breaks  Please refer to the flowsheet for vital signs taken during this treatment. After treatment patient left in no apparent distress:   Supine in bed and Call bell within reach    COMMUNICATION/COLLABORATION:   The patients plan of care was discussed with: Physician. Hill Foster, PT, DPT   Time Calculation: 12 mins         Problem: Mobility Impaired (Adult and Pediatric)  Goal: *Acute Goals and Plan of Care (Insert Text)  Description: FUNCTIONAL STATUS PRIOR TO ADMISSION: Patient was independent and active without use of DME.    HOME SUPPORT PRIOR TO ADMISSION: The patient lived alone with no local support. Physical Therapy Goals  Initiated 2020  1. Patient will perform sit to stand with modified independence within 7 day(s). 2.  Patient will ambulate with modified independence for 500 feet with the least restrictive device within 7 day(s).      Outcome: Progressing Towards Goal

## 2020-12-30 NOTE — PROGRESS NOTES
Nutrition Assessment     Type and Reason for Visit: RD nutrition re-screen/LOS    Nutrition Recommendations/Plan:   Continue w/ current diet   Ensure pudding x2/day   Rec' MVI   Record & Monitor daily wts, po & nutrition supplement     Will continue to follow as LOS    Nutrition Assessment:  71-year-old female with history of hypertension, urinary incontinence and GERD who was brought into the emergency department this afternoon by EMS after the patient was found on the floor at home by her neighbor. Patient is a poor historian, Currently waiting on placement. Noted to have fair appetite w/ ave. of 50% @ meals. Patient stated that she never a big appetite, and typicallly eats small meals. Reported that she likes easy to chew sweets such as cakes and pudding. Nutrition related labs: GFR (53). Meds: amlodipine    Malnutrition Assessment:  Malnutrition Status: Mild malnutrition     Estimated Daily Nutrient Needs:  Energy (kcal):  1,547kcals (1,031. 53BMR x 1.5)  Protein (g):  57g (1g/kg)       Fluid (ml/day):  1,547ml    Nutrition Related Findings:  cogintion: alert, awake. Digestive system: abdomen: no n/v,c/d nor dysphagia. Extremities: observed mild muscle wasting most likely r/t aging. Skin: intact, dry.       Current Nutrition Therapies:  DIET CARDIAC Regular    Anthropometric Measures:  · Height:  5' 4\" (162.6 cm)  · Current Body Wt:  57.2 kg (126 lb)  · BMI: 21.6    Nutrition Diagnosis:   · Underweight related to other (specify)(small appetite) as evidenced by (po intake 50% x7days)      Nutrition Intervention:  Food and/or Nutrient Delivery: Continue current diet, Start oral nutrition supplement(ensure pudding x2/day)  Nutrition Education and Counseling: No recommendations at this time  Coordination of Nutrition Care: Continue to monitor while inpatient, Interdisciplinary rounds    Goals:  >50% EENS, Wt. Stability =/+ Kg x 1wk       Nutrition Monitoring and Evaluation:   Behavioral-Environmental Outcomes: None identified  Food/Nutrient Intake Outcomes: Supplement intake, Food and nutrient intake  Physical Signs/Symptoms Outcomes: Weight, Biochemical data    Discharge Planning:    No discharge needs at this time

## 2020-12-31 PROCEDURE — 97530 THERAPEUTIC ACTIVITIES: CPT

## 2020-12-31 PROCEDURE — 65270000029 HC RM PRIVATE

## 2020-12-31 PROCEDURE — 74011250637 HC RX REV CODE- 250/637: Performed by: NURSE PRACTITIONER

## 2020-12-31 PROCEDURE — 74011250637 HC RX REV CODE- 250/637: Performed by: INTERNAL MEDICINE

## 2020-12-31 RX ADMIN — OXYBUTYNIN CHLORIDE 5 MG: 5 TABLET ORAL at 18:22

## 2020-12-31 RX ADMIN — OXYBUTYNIN CHLORIDE 5 MG: 5 TABLET ORAL at 20:27

## 2020-12-31 RX ADMIN — AMLODIPINE BESYLATE 2.5 MG: 2.5 TABLET ORAL at 20:27

## 2020-12-31 RX ADMIN — POTASSIUM CHLORIDE 20 MEQ: 750 TABLET, FILM COATED, EXTENDED RELEASE ORAL at 08:42

## 2020-12-31 RX ADMIN — OXYBUTYNIN CHLORIDE 5 MG: 5 TABLET ORAL at 08:42

## 2020-12-31 RX ADMIN — AMOXICILLIN AND CLAVULANATE POTASSIUM 1 TABLET: 875; 125 TABLET, FILM COATED ORAL at 08:42

## 2020-12-31 RX ADMIN — POTASSIUM CHLORIDE 20 MEQ: 750 TABLET, FILM COATED, EXTENDED RELEASE ORAL at 18:23

## 2020-12-31 RX ADMIN — ATORVASTATIN CALCIUM 20 MG: 20 TABLET, FILM COATED ORAL at 20:27

## 2020-12-31 RX ADMIN — AMOXICILLIN AND CLAVULANATE POTASSIUM 1 TABLET: 875; 125 TABLET, FILM COATED ORAL at 18:22

## 2020-12-31 RX ADMIN — TAMSULOSIN HYDROCHLORIDE 0.4 MG: 0.4 CAPSULE ORAL at 08:42

## 2020-12-31 NOTE — PROGRESS NOTES
Problem: Falls - Risk of  Goal: *Absence of Falls  Description: Document Ryan Cease Fall Risk and appropriate interventions in the flowsheet. Outcome: Progressing Towards Goal  Note: Fall Risk Interventions:  Mobility Interventions: Bed/chair exit alarm    Mentation Interventions: Bed/chair exit alarm, Adequate sleep, hydration, pain control    Medication Interventions: Bed/chair exit alarm    Elimination Interventions: Bed/chair exit alarm, Call light in reach    History of Falls Interventions: Bed/chair exit alarm         Problem: Patient Education: Go to Patient Education Activity  Goal: Patient/Family Education  Outcome: Progressing Towards Goal     Problem: Fluid Volume - Risk of, Imbalanced  Goal: *Balanced intake and output  Outcome: Progressing Towards Goal     Problem: Pressure Injury - Risk of  Goal: *Prevention of pressure injury  Description: Document Bishnu Scale and appropriate interventions in the flowsheet.   Outcome: Progressing Towards Goal  Note: Pressure Injury Interventions:  Sensory Interventions: Keep linens dry and wrinkle-free    Moisture Interventions: Minimize layers    Activity Interventions: PT/OT evaluation    Mobility Interventions: PT/OT evaluation    Nutrition Interventions: Document food/fluid/supplement intake    Friction and Shear Interventions: Minimize layers                Problem: Patient Education: Go to Patient Education Activity  Goal: Patient/Family Education  Outcome: Progressing Towards Goal     Problem: Patient Education: Go to Patient Education Activity  Goal: Patient/Family Education  Outcome: Progressing Towards Goal

## 2020-12-31 NOTE — PROGRESS NOTES
Progress Note  Date:12/31/2020       Room:[facility]  Patient Royce Garcia     YOB: 1940     Age:80 y.o. Subjective    Patient is a 81-year-old female with history of hypertension, urinary incontinence and GERD who was brought into the emergency department this afternoon by EMS after the patient was found on the floor at home by her neighbor. Anabell Cardona is a poor historian and didn't know for how long she was on the floor and power of  ([facility] [phone]) ZJS could not be reached. This morning pt was awake and oriented to person but not to place or time and and very pleasant without any complaints nor does seem like she has some discomfort with deep palpation of her abdomen.  Patient removed her IV yesterday so has been getting IM antibiotics and drinking fluids and eating.  Patient lives at home alone and does not seem to be able to take care of herself given the confusion     Patient seen and evaluated awake alert but not oriented patient continues to attempt to get out of bed is pleasantly confused no agitation noted patient does appear to be sleeping during the day and up most the night and last night patient did have a fall if she attempted to get out of bed CT scan of the head was done and was found to be negative    Review of Systems   Constitutional: Negative for activity change, appetite change, chills and fatigue. Cardiovascular: Negative for leg swelling. Gastrointestinal: Negative for abdominal distention, blood in stool, diarrhea, nausea and vomiting. Psychiatric/Behavioral: Positive for confusion. Negative for agitation and behavioral problems.        Objective           Vitals Last 24 Hours:  Patient Vitals for the past 24 hrs:   Temp Pulse Resp BP SpO2   12/31/20 1655 98 °F (36.7 °C) 87 19 106/60 96 %   12/31/20 1452 98 °F (36.7 °C) 79 18 130/76    12/31/20 1147 98 °F (36.7 °C) 79 18 130/76 98 %   12/31/20 0718 97.8 °F (36.6 °C) 68 17 (!) 102/59 99 % 12/31/20 0605    (!) 120/58    12/31/20 0535 (!) 96.7 °F (35.9 °C) 71 15 (!) 89/56 96 %   12/31/20 0020 97.2 °F (36.2 °C) 74 15 (!) 97/52 96 %   12/30/20 2013 96.9 °F (36.1 °C) 72 18 128/63 96 %        I/O (24Hr): No intake or output data in the 24 hours ending 12/31/20 1707    Physical Exam:  General Appearance:  Comfortable, well-appearing. No acute distress. HEENT: NCAT, EOMI, No deformities or discharge, Neck supple with trachea midline. Respiratory: Normal respiratory rate. Breath sounds clear to auscultation. Heart: S1 normal and S2 normal.  No tachycardia  Abdomen: Soft and flat. Bowel sounds are normal. No rebound or guarding. tenderness to palpation mid to lower abdomen. Extremities: Normal range of motion. No acute deformities. Neurological: Alert and oriented to person   Skin:  Warm and dry. Medications           Current Facility-Administered Medications   Medication Dose Route Frequency    potassium chloride SR (KLOR-CON 10) tablet 20 mEq  20 mEq Oral BID    amoxicillin-clavulanate (AUGMENTIN) 875-125 mg per tablet 1 Tab  1 Tab Oral BID WITH MEALS    amLODIPine (NORVASC) tablet 2.5 mg  2.5 mg Oral QHS    oxybutynin (DITROPAN) tablet 5 mg  5 mg Oral TID    pantoprazole (PROTONIX) tablet 40 mg  40 mg Oral ACB    acetaminophen (TYLENOL) tablet 650 mg  650 mg Oral Q6H PRN    ondansetron (ZOFRAN) injection 4 mg  4 mg IntraVENous Q8H PRN    metoprolol succinate (TOPROL-XL) XL tablet 50 mg  50 mg Oral DAILY    atorvastatin (LIPITOR) tablet 20 mg  20 mg Oral QHS    tamsulosin (FLOMAX) capsule 0.4 mg  0.4 mg Oral DAILY         Allergies         Codeine, Codeine, Ibuprofen, Morphine, and Morphine       Labs/Imaging/Diagnostics      Labs:  No results found for this or any previous visit (from the past 24 hour(s)). Trended key labs include:  No results for input(s): WBC, HGB, HCT, PLT, HGBEXT, HCTEXT, PLTEXT, HGBEXT, HCTEXT, PLTEXT in the last 72 hours.   No results for input(s): NA, K, CL, CO2, GLU, BUN, CREA, CA, MG, PHOS, ALB, TBIL, TBILI, ALT, INR, INREXT, INREXT in the last 72 hours. No lab exists for component: SGOT    Imaging Last 24 Hours:  Ct Head Wo Cont    Result Date: 12/30/2020  CT dose reduction was achieved through use of a standardized protocol tailored for this examination and automatic exposure control for dose modulation. CT Head  History: The sulci are prominent but symmetric. Periventricular and deep white matter hypodensity is not unexpected for age. Chronic right basal ganglia infarct, as seen a week ago. No acute abnormality seen gray or white matter. Ventricles are symmetric and appropriate for atrophy. There is no midline shift or mass effect, or evidence of hemorrhage. Bone windows show no fracture. Impression: Age-appropriate atrophy. No acute findings. 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     Pt improved and now very much awake though still not oriented  -12/25/2020 patient is eating and drinking as per nursing so we will continue to hold on restarting her IV.  -12/26/2020  BUN/crea continues to improve, continue to monitor  -12/27/2020 renal function stable continue IV fluids  -12/29: DC IV fluids    Hypertension  Bps stable, continue amlodipine, HCTZ, metoprolol    Acute cystitis without hematuria  Currently on rocephin and awaiting urine culture  -12/25/2020 urine culture still pending but will change antibiotics to Augmentin  -12/26/20 sister reports that pt with chronic UTI and is on prophylactic abx that is being managed by   -12/27/2020 continue with Augmentin for UTI  -12/28 tolerating well he is afebrile    Hypokalemia  -12/25/2020 potassium remains low at 3.4 on potassium chloride 20 mEq daily. Will increase to 20 mEq twice daily repeat level in the morning also include a mag level  -12/26/2020  Potassium normal, continue with supplements at 20 meq BID  -12/27/2020 potassium normal continue potassium chloride 20 equivalents twice daily  -12/28 stable sinew current supplementation    Thrombocytopenia ([address]  -12/25/2020 platelets decreased from previous we will hold Lovenox and repeat level in the morning  -12/26/2020 platelets improved from previous, continue to follow  -12/28 patient's platelet levels back in normal range    Dementia ([address]  Spoke to pt's sister, Esther Reynoso, who stated that it is only her and the patient in the family and pt has always refused to do a living well or power of . As per sister patient has been told many times before that she is not to live alone and at one point was in the nursing home in [place] until about a year ago when she decided she was going to live with her sister but only did so for short time then moved back to [facility] and has been on her home. Patient is normally followed by Dr. Seven Gibbs. Patient's sister was informed that we will attempt to get patient placed in a nursing home because given her mentation she is not able to live alone as she is not oriented to time or place.   -12/27/2020 patient still oriented only to person and will need placement since she is unable to care for herself given her mentation  -12/28 extensive discussion by case management with patient's family and it was noted that patient's dementia prevents her from living on her own previously was living at the nursing home at that time was noted to have acute kidney sepsis PEG. Patient left nursing home and initially lived with her sister but at this time is unable to take care of her as it is noted that patient has worsening dementia  -12/31: Overnight patient had fall continue to monitor closely possible use of Seroquel at bedtime to help patient rest    Abdominal pain  She has been having tenderness to palpation of the abdomen so a x-ray was done of the abdomen which did not reveal any acute findings. Patient will continue to be monitored while awaiting for appropriate safe discharge plan    Spent 25 minutes evaluting and coordinating patient care of which >50% was spent coordinating and counseling.

## 2020-12-31 NOTE — ROUTINE PROCESS
Bedside shift change report given to Siva Luu LPN by Royal Preethi VELASQUEZ. Report included the following information Kardex. From: Misti Velasquez  To: ERASMO Yoo  Sent: 7/22/2020 5:27 PM CDT  Subject: Other    Hi Kassy , My pulse has been up today, the highest it went was 138, last time I went to ER for this and they really found nothing wrong, Is my pulse up anything to worry about. Michelle Velasquez

## 2020-12-31 NOTE — PROGRESS NOTES
At the nurses station and heard a thud. Went into room and found patient on floor. Pt stated \"I fell\". Bed alarm was in place but not alarming. Bed alarm was on all day and alarmed. When moved blanket alarm went off. Code Nanda called MD and NS arrived. RN assessment completed. Pt had hard bump on head. No other marks noted. Pt did state she hit her head. New orders received. Pt in bed, bed rails up and bed alarm on.

## 2020-12-31 NOTE — PROGRESS NOTES
Code Autmn called. Patient is dementia, UTI, awaiting placement. Bed alarm activated, but did not sound. Nurse heard thud from room and went in immediately to find pt supine on floor. No apparent LOC. Upon physical exam, note area of redness to posterior skull. No other deformities noted to neck, back or extremities. Pt only c/o is posterior head pain. No focal deficits. PERRLA. Vitals stable. Pt is pleasantly confused, but at baseline. Returned to bed. Will obtain CT Head wo to rule out acute abnormality secondary to fall.

## 2020-12-31 NOTE — PROGRESS NOTES
PHYSICAL THERAPY TREATMENT  Patient: Teresa Hamilton (89 y.o. female)  Date: 12/31/2020  Diagnosis: Dehydration [E86.0]  UTI (urinary tract infection) [N39.0]  Dementia ([facility] [address] [facility] [F03.90] <principal problem not specified>       Precautions:  Fall risk  Chart, physical therapy assessment, plan of care and goals were reviewed. ASSESSMENT  Patient continues with skilled PT services and is progressing towards goals. Pt is steadily progressing with improvements noticed in functional mobility and activity tolerance. Pt was able to ambulate with FWW for 200' with SBA. Continue to progress as able. Current Level of Function Impacting Discharge (mobility/balance): decreased functional mobility and activity tolerance    Other factors to consider for discharge: Altered mental status         PLAN :  Patient continues to benefit from skilled intervention to address the above impairments. Continue treatment per established plan of care. to address goals. Recommendation for discharge: (in order for the patient to meet his/her long term goals)  Physical therapy at least 2 days/week in the home     This discharge recommendation:  Has been made in collaboration with the attending provider and/or case management    IF patient discharges home will need the following DME: walker       SUBJECTIVE:   Patient stated I am doing good.     OBJECTIVE DATA SUMMARY:   Critical Behavior:  Neurologic State: Alert  Orientation Level: Oriented to person  Cognition: Memory loss     Functional Mobility Training:  Bed Mobility:  Rolling: Independent  Supine to Sit: Independent  Sit to Supine: Independent  Scooting: Independent    Transfers:  Sit to Stand: Supervision  Stand to Sit: Supervision     Balance:  Sitting: Intact  Ambulation/Gait Training:  Distance (ft): 200 Feet (ft)  Assistive Device: Walker  Ambulation - Level of Assistance: Supervision    Treatment Session:   Pt tolerated therapy session well and is steadily progressing.  Pt was able to ambulate with SBA with FWW for 200' within room. Continue to progress as able. Pain Ratin/10    Activity Tolerance:   Fair and tolerates ADLs without rest breaks  Please refer to the flowsheet for vital signs taken during this treatment. After treatment patient left in no apparent distress:   Supine in bed, Call bell within reach, Bed / chair alarm activated, and Side rails x 3    COMMUNICATION/COLLABORATION:   The patients plan of care was discussed with: Physician. Renae Stuart, PT, DPT   Time Calculation: 21 mins         Problem: Mobility Impaired (Adult and Pediatric)  Goal: *Acute Goals and Plan of Care (Insert Text)  Description: FUNCTIONAL STATUS PRIOR TO ADMISSION: Patient was independent and active without use of DME.    HOME SUPPORT PRIOR TO ADMISSION: The patient lived alone with no local support. Physical Therapy Goals  Initiated 2020  1. Patient will perform sit to stand with modified independence within 7 day(s). 2.  Patient will ambulate with modified independence for 500 feet with the least restrictive device within 7 day(s).      Outcome: Progressing Towards Goal

## 2020-12-31 NOTE — PROGRESS NOTES
Bedside shift change report given to [facility] (oncoming nurse) by Carla Tomlinson (offgoing nurse). Report included the following information SBAR.

## 2021-01-01 ENCOUNTER — HOSPITAL ENCOUNTER (INPATIENT)
Age: 81
LOS: 4 days | Discharge: NURSING, REHAB OR LONG-TERM CARE | DRG: 682 | End: 2021-02-19
Attending: EMERGENCY MEDICINE | Admitting: FAMILY MEDICINE
Payer: MEDICARE

## 2021-01-01 ENCOUNTER — HOSPITAL ENCOUNTER (EMERGENCY)
Age: 81
Discharge: NURSING, REHAB OR LONG-TERM CARE | End: 2021-01-22
Attending: EMERGENCY MEDICINE
Payer: MEDICARE

## 2021-01-01 ENCOUNTER — HOSPITAL ENCOUNTER (EMERGENCY)
Age: 81
End: 2021-02-26
Attending: EMERGENCY MEDICINE
Payer: MEDICARE

## 2021-01-01 ENCOUNTER — APPOINTMENT (OUTPATIENT)
Dept: GENERAL RADIOLOGY | Age: 81
DRG: 682 | End: 2021-01-01
Attending: EMERGENCY MEDICINE
Payer: MEDICARE

## 2021-01-01 ENCOUNTER — APPOINTMENT (OUTPATIENT)
Dept: CT IMAGING | Age: 81
DRG: 177 | End: 2021-01-01
Attending: HOSPITALIST
Payer: MEDICARE

## 2021-01-01 ENCOUNTER — APPOINTMENT (OUTPATIENT)
Dept: GENERAL RADIOLOGY | Age: 81
DRG: 177 | End: 2021-01-01
Attending: EMERGENCY MEDICINE
Payer: MEDICARE

## 2021-01-01 ENCOUNTER — APPOINTMENT (OUTPATIENT)
Dept: GENERAL RADIOLOGY | Age: 81
End: 2021-01-01
Attending: NURSE PRACTITIONER
Payer: MEDICARE

## 2021-01-01 ENCOUNTER — HOSPITAL ENCOUNTER (INPATIENT)
Age: 81
LOS: 8 days | Discharge: NURSING, REHAB OR LONG-TERM CARE | DRG: 177 | End: 2021-02-04
Attending: EMERGENCY MEDICINE | Admitting: EMERGENCY MEDICINE
Payer: MEDICARE

## 2021-01-01 ENCOUNTER — APPOINTMENT (OUTPATIENT)
Dept: VASCULAR SURGERY | Age: 81
DRG: 682 | End: 2021-01-01
Attending: NURSE PRACTITIONER
Payer: MEDICARE

## 2021-01-01 ENCOUNTER — APPOINTMENT (OUTPATIENT)
Dept: GENERAL RADIOLOGY | Age: 81
End: 2021-01-01
Attending: EMERGENCY MEDICINE
Payer: MEDICARE

## 2021-01-01 ENCOUNTER — APPOINTMENT (OUTPATIENT)
Dept: CT IMAGING | Age: 81
DRG: 177 | End: 2021-01-01
Attending: FAMILY MEDICINE
Payer: MEDICARE

## 2021-01-01 VITALS
DIASTOLIC BLOOD PRESSURE: 66 MMHG | OXYGEN SATURATION: 95 % | SYSTOLIC BLOOD PRESSURE: 131 MMHG | HEART RATE: 55 BPM | TEMPERATURE: 97.4 F | HEIGHT: 66 IN | RESPIRATION RATE: 18 BRPM | BODY MASS INDEX: 20.18 KG/M2

## 2021-01-01 VITALS
TEMPERATURE: 97.7 F | OXYGEN SATURATION: 97 % | SYSTOLIC BLOOD PRESSURE: 133 MMHG | DIASTOLIC BLOOD PRESSURE: 76 MMHG | HEIGHT: 69 IN | HEART RATE: 87 BPM | WEIGHT: 125 LBS | BODY MASS INDEX: 18.51 KG/M2 | RESPIRATION RATE: 16 BRPM

## 2021-01-01 VITALS
BODY MASS INDEX: 20.18 KG/M2 | WEIGHT: 125 LBS | RESPIRATION RATE: 20 BRPM | OXYGEN SATURATION: 98 % | TEMPERATURE: 98.9 F | HEART RATE: 95 BPM | SYSTOLIC BLOOD PRESSURE: 112 MMHG | DIASTOLIC BLOOD PRESSURE: 73 MMHG

## 2021-01-01 VITALS
DIASTOLIC BLOOD PRESSURE: 62 MMHG | SYSTOLIC BLOOD PRESSURE: 77 MMHG | HEART RATE: 107 BPM | OXYGEN SATURATION: 94 % | TEMPERATURE: 99 F | RESPIRATION RATE: 20 BRPM

## 2021-01-01 DIAGNOSIS — R62.7 ADULT FAILURE TO THRIVE: ICD-10-CM

## 2021-01-01 DIAGNOSIS — U07.1 COVID-19: Primary | ICD-10-CM

## 2021-01-01 DIAGNOSIS — E86.0 DEHYDRATION: Primary | ICD-10-CM

## 2021-01-01 DIAGNOSIS — N28.9 RENAL INSUFFICIENCY: ICD-10-CM

## 2021-01-01 DIAGNOSIS — E87.0 HYPERNATREMIA: ICD-10-CM

## 2021-01-01 DIAGNOSIS — B34.9 VIRAL SYNDROME: Primary | ICD-10-CM

## 2021-01-01 DIAGNOSIS — E86.0 DEHYDRATION: ICD-10-CM

## 2021-01-01 DIAGNOSIS — N17.9 ACUTE RENAL INJURY (HCC): ICD-10-CM

## 2021-01-01 LAB
25(OH)D3 SERPL-MCNC: 43.2 NG/ML (ref 30–100)
ABO + RH BLD: NORMAL
ALBUMIN SERPL-MCNC: 2.4 G/DL (ref 3.5–5)
ALBUMIN SERPL-MCNC: 2.8 G/DL (ref 3.5–5)
ALBUMIN SERPL-MCNC: 3.2 G/DL (ref 3.5–5)
ALBUMIN SERPL-MCNC: 3.7 G/DL (ref 3.5–5)
ALBUMIN SERPL-MCNC: 3.9 G/DL (ref 3.5–5)
ALBUMIN/GLOB SERPL: 0.8 {RATIO} (ref 1.1–2.2)
ALBUMIN/GLOB SERPL: 0.9 {RATIO} (ref 1.1–2.2)
ALP SERPL-CCNC: 107 U/L (ref 45–117)
ALP SERPL-CCNC: 121 U/L (ref 45–117)
ALP SERPL-CCNC: 96 U/L (ref 45–117)
ALP SERPL-CCNC: 99 U/L (ref 45–117)
ALT SERPL-CCNC: 22 U/L (ref 12–78)
ALT SERPL-CCNC: 24 U/L (ref 12–78)
ALT SERPL-CCNC: 24 U/L (ref 12–78)
ALT SERPL-CCNC: 32 U/L (ref 12–78)
AMORPH CRY URNS QL MICRO: ABNORMAL
ANION GAP SERPL CALC-SCNC: 11 MMOL/L (ref 5–15)
ANION GAP SERPL CALC-SCNC: 12 MMOL/L (ref 5–15)
ANION GAP SERPL CALC-SCNC: 14 MMOL/L (ref 5–15)
ANION GAP SERPL CALC-SCNC: 14 MMOL/L (ref 5–15)
ANION GAP SERPL CALC-SCNC: 15 MMOL/L (ref 5–15)
ANION GAP SERPL CALC-SCNC: 15 MMOL/L (ref 5–15)
ANION GAP SERPL CALC-SCNC: 25 MMOL/L (ref 5–15)
ANION GAP SERPL CALC-SCNC: 5 MMOL/L (ref 5–15)
ANION GAP SERPL CALC-SCNC: 6 MMOL/L (ref 5–15)
ANION GAP SERPL CALC-SCNC: 8 MMOL/L (ref 5–15)
ANION GAP SERPL CALC-SCNC: 8 MMOL/L (ref 5–15)
ANION GAP SERPL CALC-SCNC: 9 MMOL/L (ref 5–15)
APPEARANCE UR: ABNORMAL
APPEARANCE UR: CLEAR
APPEARANCE UR: CLEAR
APTT PPP: 26.2 SEC (ref 22.1–31)
AST SERPL W P-5'-P-CCNC: 35 U/L (ref 15–37)
AST SERPL W P-5'-P-CCNC: 49 U/L (ref 15–37)
AST SERPL W P-5'-P-CCNC: 52 U/L (ref 15–37)
AST SERPL W P-5'-P-CCNC: 64 U/L (ref 15–37)
ATRIAL RATE: 105 BPM
ATRIAL RATE: 108 BPM
ATRIAL RATE: 99 BPM
BACTERIA URNS QL MICRO: ABNORMAL /HPF
BASOPHILS # BLD: 0 K/UL (ref 0–0.2)
BASOPHILS NFR BLD: 0 % (ref 0–2.5)
BASOPHILS NFR BLD: 1 % (ref 0–2.5)
BILIRUB SERPL-MCNC: 1.3 MG/DL (ref 0.2–1)
BILIRUB SERPL-MCNC: 1.6 MG/DL (ref 0.2–1)
BILIRUB SERPL-MCNC: 2.3 MG/DL (ref 0.2–1)
BILIRUB SERPL-MCNC: 2.6 MG/DL (ref 0.2–1)
BILIRUB UR QL CFM: POSITIVE
BLOOD GROUP ANTIBODIES SERPL: NEGATIVE
BUN SERPL-MCNC: 12 MG/DL (ref 6–20)
BUN SERPL-MCNC: 15 MG/DL (ref 6–20)
BUN SERPL-MCNC: 16 MG/DL (ref 6–20)
BUN SERPL-MCNC: 17 MG/DL (ref 6–20)
BUN SERPL-MCNC: 18 MG/DL (ref 6–20)
BUN SERPL-MCNC: 18 MG/DL (ref 6–20)
BUN SERPL-MCNC: 20 MG/DL (ref 6–20)
BUN SERPL-MCNC: 29 MG/DL (ref 6–20)
BUN SERPL-MCNC: 30 MG/DL (ref 6–20)
BUN SERPL-MCNC: 36 MG/DL (ref 6–20)
BUN SERPL-MCNC: 36 MG/DL (ref 6–20)
BUN SERPL-MCNC: 44 MG/DL (ref 6–20)
BUN SERPL-MCNC: 45 MG/DL (ref 6–20)
BUN SERPL-MCNC: 46 MG/DL (ref 6–20)
BUN SERPL-MCNC: 46 MG/DL (ref 6–20)
BUN SERPL-MCNC: 47 MG/DL (ref 6–20)
BUN SERPL-MCNC: 52 MG/DL (ref 6–20)
BUN SERPL-MCNC: 53 MG/DL (ref 6–20)
BUN SERPL-MCNC: 9 MG/DL (ref 6–20)
BUN SERPL-MCNC: 90 MG/DL (ref 6–20)
BUN/CREAT SERPL: 16 (ref 12–20)
BUN/CREAT SERPL: 17 (ref 12–20)
BUN/CREAT SERPL: 19 (ref 12–20)
BUN/CREAT SERPL: 19 (ref 12–20)
BUN/CREAT SERPL: 20 (ref 12–20)
BUN/CREAT SERPL: 21 (ref 12–20)
BUN/CREAT SERPL: 28 (ref 12–20)
BUN/CREAT SERPL: 32 (ref 12–20)
BUN/CREAT SERPL: 32 (ref 12–20)
BUN/CREAT SERPL: 34 (ref 12–20)
BUN/CREAT SERPL: 34 (ref 12–20)
BUN/CREAT SERPL: 35 (ref 12–20)
BUN/CREAT SERPL: 36 (ref 12–20)
BUN/CREAT SERPL: 40 (ref 12–20)
BUN/CREAT SERPL: 9 (ref 12–20)
CA-I BLD-MCNC: 10.2 MG/DL (ref 8.5–10.1)
CA-I BLD-MCNC: 10.3 MG/DL (ref 8.5–10.1)
CA-I BLD-MCNC: 10.4 MG/DL (ref 8.5–10.1)
CA-I BLD-MCNC: 10.8 MG/DL (ref 8.5–10.1)
CA-I BLD-MCNC: 7.7 MG/DL (ref 8.5–10.1)
CA-I BLD-MCNC: 7.7 MG/DL (ref 8.5–10.1)
CA-I BLD-MCNC: 8 MG/DL (ref 8.5–10.1)
CA-I BLD-MCNC: 8.2 MG/DL (ref 8.5–10.1)
CA-I BLD-MCNC: 8.4 MG/DL (ref 8.5–10.1)
CA-I BLD-MCNC: 8.8 MG/DL (ref 8.5–10.1)
CA-I BLD-MCNC: 8.8 MG/DL (ref 8.5–10.1)
CA-I BLD-MCNC: 9 MG/DL (ref 8.5–10.1)
CA-I BLD-MCNC: 9 MG/DL (ref 8.5–10.1)
CA-I BLD-MCNC: 9.1 MG/DL (ref 8.5–10.1)
CA-I BLD-MCNC: 9.5 MG/DL (ref 8.5–10.1)
CA-I BLD-MCNC: 9.7 MG/DL (ref 8.5–10.1)
CA-I BLD-MCNC: 9.7 MG/DL (ref 8.5–10.1)
CALCULATED P AXIS, ECG09: 56 DEGREES
CALCULATED P AXIS, ECG09: 81 DEGREES
CALCULATED P AXIS, ECG09: 85 DEGREES
CALCULATED R AXIS, ECG10: 52 DEGREES
CALCULATED R AXIS, ECG10: 52 DEGREES
CALCULATED R AXIS, ECG10: 54 DEGREES
CALCULATED T AXIS, ECG11: -107 DEGREES
CALCULATED T AXIS, ECG11: -107 DEGREES
CALCULATED T AXIS, ECG11: -85 DEGREES
CHLORIDE SERPL-SCNC: 101 MMOL/L (ref 97–108)
CHLORIDE SERPL-SCNC: 103 MMOL/L (ref 97–108)
CHLORIDE SERPL-SCNC: 105 MMOL/L (ref 97–108)
CHLORIDE SERPL-SCNC: 105 MMOL/L (ref 97–108)
CHLORIDE SERPL-SCNC: 107 MMOL/L (ref 97–108)
CHLORIDE SERPL-SCNC: 108 MMOL/L (ref 97–108)
CHLORIDE SERPL-SCNC: 108 MMOL/L (ref 97–108)
CHLORIDE SERPL-SCNC: 109 MMOL/L (ref 97–108)
CHLORIDE SERPL-SCNC: 109 MMOL/L (ref 97–108)
CHLORIDE SERPL-SCNC: 110 MMOL/L (ref 97–108)
CHLORIDE SERPL-SCNC: 111 MMOL/L (ref 97–108)
CHLORIDE SERPL-SCNC: 112 MMOL/L (ref 97–108)
CHLORIDE SERPL-SCNC: 112 MMOL/L (ref 97–108)
CHLORIDE SERPL-SCNC: 113 MMOL/L (ref 97–108)
CHLORIDE SERPL-SCNC: 115 MMOL/L (ref 97–108)
CHLORIDE SERPL-SCNC: 116 MMOL/L (ref 97–108)
CHLORIDE SERPL-SCNC: 118 MMOL/L (ref 97–108)
CO2 SERPL-SCNC: 16 MMOL/L (ref 21–32)
CO2 SERPL-SCNC: 24 MMOL/L (ref 21–32)
CO2 SERPL-SCNC: 24 MMOL/L (ref 21–32)
CO2 SERPL-SCNC: 25 MMOL/L (ref 21–32)
CO2 SERPL-SCNC: 26 MMOL/L (ref 21–32)
CO2 SERPL-SCNC: 26 MMOL/L (ref 21–32)
CO2 SERPL-SCNC: 28 MMOL/L (ref 21–32)
CO2 SERPL-SCNC: 28 MMOL/L (ref 21–32)
CO2 SERPL-SCNC: 29 MMOL/L (ref 21–32)
CO2 SERPL-SCNC: 30 MMOL/L (ref 21–32)
CO2 SERPL-SCNC: 31 MMOL/L (ref 21–32)
CO2 SERPL-SCNC: 32 MMOL/L (ref 21–32)
CO2 SERPL-SCNC: 33 MMOL/L (ref 21–32)
CO2 SERPL-SCNC: 33 MMOL/L (ref 21–32)
CO2 SERPL-SCNC: 34 MMOL/L (ref 21–32)
COLOR UR: ABNORMAL
COVID-19 RAPID TEST, COVR: DETECTED
COVID-19 RAPID TEST, COVR: DETECTED
COVID-19 RAPID TEST, COVR: NOT DETECTED
CREAT SERPL-MCNC: 0.64 MG/DL (ref 0.55–1.02)
CREAT SERPL-MCNC: 0.74 MG/DL (ref 0.55–1.02)
CREAT SERPL-MCNC: 0.84 MG/DL (ref 0.55–1.02)
CREAT SERPL-MCNC: 0.86 MG/DL (ref 0.55–1.02)
CREAT SERPL-MCNC: 0.87 MG/DL (ref 0.55–1.02)
CREAT SERPL-MCNC: 0.99 MG/DL (ref 0.55–1.02)
CREAT SERPL-MCNC: 1 MG/DL (ref 0.55–1.02)
CREAT SERPL-MCNC: 1.02 MG/DL (ref 0.55–1.02)
CREAT SERPL-MCNC: 1.12 MG/DL (ref 0.55–1.02)
CREAT SERPL-MCNC: 1.13 MG/DL (ref 0.55–1.02)
CREAT SERPL-MCNC: 1.17 MG/DL (ref 0.55–1.02)
CREAT SERPL-MCNC: 1.28 MG/DL (ref 0.55–1.02)
CREAT SERPL-MCNC: 1.32 MG/DL (ref 0.55–1.02)
CREAT SERPL-MCNC: 1.33 MG/DL (ref 0.55–1.02)
CREAT SERPL-MCNC: 1.49 MG/DL (ref 0.55–1.02)
CREAT SERPL-MCNC: 1.51 MG/DL (ref 0.55–1.02)
CREAT SERPL-MCNC: 2.8 MG/DL (ref 0.55–1.02)
CRP SERPL-MCNC: 3.15 MG/DL
CRP SERPL-MCNC: 5.87 MG/DL
D DIMER PPP FEU-MCNC: 12.14 MG/L FEU (ref 0.19–0.5)
D DIMER PPP FEU-MCNC: 16.93 MG/L FEU (ref 0.19–0.5)
D DIMER PPP FEU-MCNC: 24.66 MG/L FEU (ref 0.19–0.5)
D DIMER PPP FEU-MCNC: 35.1 MG/L FEU (ref 0.19–0.5)
D DIMER PPP FEU-MCNC: >35.2 MG/L FEU (ref 0.19–0.5)
DIAGNOSIS, 93000: NORMAL
ECHO AO ROOT DIAM: 3.18 CM
ECHO AV AREA PEAK VELOCITY: 4.04 CM2
ECHO AV AREA VTI: 4.84 CM2
ECHO AV AREA VTI: 5.36 CM2
ECHO AV MEAN GRADIENT: 42.72 MMHG
ECHO AV MEAN VELOCITY: 303.51 CM/S
ECHO AV PEAK GRADIENT: 69.72 MMHG
ECHO AV PEAK VELOCITY: 405.41 CM/S
ECHO AV REGURGITANT PHT: 376.06 MS
ECHO AV VTI: 63.91 CM
ECHO EST RA PRESSURE: 3 MMHG
ECHO LA VOL 2C: 13.29 ML (ref 22–52)
ECHO LA VOL 4C: 22.32 ML (ref 22–52)
ECHO LA VOL BP: 18.3 ML (ref 22–52)
ECHO LV EDV A2C: 29.02 ML
ECHO LV EDV BP: 14.43 ML (ref 56–104)
ECHO LV EJECTION FRACTION A2C: 82 PERCENT
ECHO LV EJECTION FRACTION A4C: 65 PERCENT
ECHO LV EJECTION FRACTION A4C: 65 PERCENT
ECHO LV EJECTION FRACTION BIPLANE: 75 PERCENT (ref 55–100)
ECHO LV ESV A2C: 2.83 ML
ECHO LV ESV BP: 3.6 ML (ref 19–49)
ECHO LV INTERNAL DIMENSION DIASTOLIC: 2.78 CM (ref 3.9–5.3)
ECHO LV INTERNAL DIMENSION SYSTOLIC: 1.91 CM
ECHO LV IVSD: 1.37 CM (ref 0.6–0.9)
ECHO LV MASS 2D: 124.2 G (ref 67–162)
ECHO LV POSTERIOR WALL DIASTOLIC: 1.39 CM (ref 0.6–0.9)
ECHO LVOT DIAM: 2.03 CM
ECHO LVOT PEAK GRADIENT: 101.62 MMHG
ECHO LVOT PEAK VELOCITY: 504.03 CM/S
ECHO LVOT SV: 14.5 ML
ECHO LVOT SV: 14.5 ML
ECHO LVOT VTI: 95.12 CM
ECHO MV AREA VTI: 15.03 CM2
ECHO MV AREA VTI: 15.03 CM2
ECHO MV MEAN GRADIENT: 4.57 MMHG
ECHO RA AREA 4C: 6 CM2
ECHO RIGHT VENTRICULAR SYSTOLIC PRESSURE (RVSP): 26 MMHG
ECHO RV INTERNAL DIMENSION: 1.72 CM
ECHO TV REGURGITANT MAX VELOCITY: 239.85 CM/S
ECHO TV REGURGITANT PEAK GRADIENT: 23.01 MMHG
EOSINOPHIL # BLD: 0 K/UL (ref 0–0.7)
EOSINOPHIL # BLD: 0.2 K/UL (ref 0–0.7)
EOSINOPHIL # BLD: 0.2 K/UL (ref 0–0.7)
EOSINOPHIL NFR BLD: 0 % (ref 0.9–2.9)
EOSINOPHIL NFR BLD: 3 % (ref 0.9–2.9)
EOSINOPHIL NFR BLD: 4 % (ref 0.9–2.9)
ERYTHROCYTE [DISTWIDTH] IN BLOOD BY AUTOMATED COUNT: 16.5 % (ref 11.5–14.5)
ERYTHROCYTE [DISTWIDTH] IN BLOOD BY AUTOMATED COUNT: 16.8 % (ref 11.5–14.5)
ERYTHROCYTE [DISTWIDTH] IN BLOOD BY AUTOMATED COUNT: 17.4 % (ref 11.5–14.5)
ERYTHROCYTE [DISTWIDTH] IN BLOOD BY AUTOMATED COUNT: 17.5 % (ref 11.5–14.5)
ERYTHROCYTE [DISTWIDTH] IN BLOOD BY AUTOMATED COUNT: 17.5 % (ref 11.5–14.5)
ERYTHROCYTE [DISTWIDTH] IN BLOOD BY AUTOMATED COUNT: 17.6 % (ref 11.5–14.5)
ERYTHROCYTE [DISTWIDTH] IN BLOOD BY AUTOMATED COUNT: 17.7 % (ref 11.5–14.5)
ERYTHROCYTE [DISTWIDTH] IN BLOOD BY AUTOMATED COUNT: 17.7 % (ref 11.5–14.5)
ERYTHROCYTE [DISTWIDTH] IN BLOOD BY AUTOMATED COUNT: 18.1 % (ref 11.5–14.5)
ERYTHROCYTE [DISTWIDTH] IN BLOOD BY AUTOMATED COUNT: 18.7 % (ref 11.5–14.5)
FERRITIN SERPL-MCNC: 455 NG/ML (ref 8–252)
FIBRINOGEN PPP-MCNC: 283 MG/DL (ref 200–475)
GLOBULIN SER CALC-MCNC: 3.1 G/DL (ref 2–4)
GLOBULIN SER CALC-MCNC: 4 G/DL (ref 2–4)
GLOBULIN SER CALC-MCNC: 4.9 G/DL (ref 2–4)
GLOBULIN SER CALC-MCNC: 5 G/DL (ref 2–4)
GLUCOSE SERPL-MCNC: 101 MG/DL (ref 65–100)
GLUCOSE SERPL-MCNC: 102 MG/DL (ref 65–100)
GLUCOSE SERPL-MCNC: 104 MG/DL (ref 65–100)
GLUCOSE SERPL-MCNC: 105 MG/DL (ref 65–100)
GLUCOSE SERPL-MCNC: 106 MG/DL (ref 65–100)
GLUCOSE SERPL-MCNC: 107 MG/DL (ref 65–100)
GLUCOSE SERPL-MCNC: 115 MG/DL (ref 65–100)
GLUCOSE SERPL-MCNC: 118 MG/DL (ref 65–100)
GLUCOSE SERPL-MCNC: 119 MG/DL (ref 65–100)
GLUCOSE SERPL-MCNC: 125 MG/DL (ref 65–100)
GLUCOSE SERPL-MCNC: 135 MG/DL (ref 65–100)
GLUCOSE SERPL-MCNC: 146 MG/DL (ref 65–100)
GLUCOSE SERPL-MCNC: 179 MG/DL (ref 65–100)
GLUCOSE SERPL-MCNC: 61 MG/DL (ref 65–100)
GLUCOSE SERPL-MCNC: 69 MG/DL (ref 65–100)
GLUCOSE SERPL-MCNC: 71 MG/DL (ref 65–100)
GLUCOSE SERPL-MCNC: 97 MG/DL (ref 65–100)
GLUCOSE UR STRIP.AUTO-MCNC: 250 MG/DL
GLUCOSE UR STRIP.AUTO-MCNC: NEGATIVE MG/DL
GLUCOSE UR STRIP.AUTO-MCNC: NEGATIVE MG/DL
GRAN CASTS URNS QL MICRO: >20 /LPF
HCT VFR BLD AUTO: 35.7 % (ref 36–46)
HCT VFR BLD AUTO: 37.1 % (ref 36–46)
HCT VFR BLD AUTO: 37.7 % (ref 36–46)
HCT VFR BLD AUTO: 38.6 % (ref 36–46)
HCT VFR BLD AUTO: 39.2 % (ref 36–46)
HCT VFR BLD AUTO: 39.2 % (ref 36–46)
HCT VFR BLD AUTO: 39.7 % (ref 36–46)
HCT VFR BLD AUTO: 42.5 % (ref 36–46)
HCT VFR BLD AUTO: 42.7 % (ref 36–46)
HCT VFR BLD AUTO: 44.2 % (ref 36–46)
HCT VFR BLD AUTO: 46.9 % (ref 36–46)
HCT VFR BLD AUTO: 52.2 % (ref 36–46)
HCT VFR BLD AUTO: 54.2 % (ref 36–46)
HCT VFR BLD AUTO: 55.9 % (ref 36–46)
HEMOCCULT SP1 STL QL: POSITIVE
HGB BLD-MCNC: 11.8 G/DL (ref 13.5–17.5)
HGB BLD-MCNC: 12.5 G/DL (ref 13.5–17.5)
HGB BLD-MCNC: 12.7 G/DL (ref 13.5–17.5)
HGB BLD-MCNC: 12.9 G/DL (ref 13.5–17.5)
HGB BLD-MCNC: 12.9 G/DL (ref 13.5–17.5)
HGB BLD-MCNC: 13.2 G/DL (ref 13.5–17.5)
HGB BLD-MCNC: 13.5 G/DL (ref 13.5–17.5)
HGB BLD-MCNC: 14.2 G/DL (ref 13.5–17.5)
HGB BLD-MCNC: 14.2 G/DL (ref 13.5–17.5)
HGB BLD-MCNC: 14.8 G/DL (ref 13.5–17.5)
HGB BLD-MCNC: 15.4 G/DL (ref 13.5–17.5)
HGB BLD-MCNC: 17.1 G/DL (ref 13.5–17.5)
HGB BLD-MCNC: 18 G/DL (ref 13.5–17.5)
HGB BLD-MCNC: 18.4 G/DL (ref 13.5–17.5)
HGB UR QL STRIP: ABNORMAL
HGB UR QL STRIP: ABNORMAL
HGB UR QL STRIP: NEGATIVE
INR PPP: 1.3 (ref 0.9–1.1)
KETONES UR QL STRIP.AUTO: ABNORMAL MG/DL
KETONES UR QL STRIP.AUTO: ABNORMAL MG/DL
KETONES UR QL STRIP.AUTO: NEGATIVE MG/DL
LACTATE SERPL-SCNC: 10.5 MMOL/L (ref 0.4–2)
LACTATE SERPL-SCNC: 2.2 MMOL/L (ref 0.4–2)
LDH SERPL L TO P-CCNC: 416 U/L (ref 81–264)
LEUKOCYTE ESTERASE UR QL STRIP.AUTO: ABNORMAL
LEUKOCYTE ESTERASE UR QL STRIP.AUTO: ABNORMAL
LEUKOCYTE ESTERASE UR QL STRIP.AUTO: NEGATIVE
LVOT MG: 71.22 MMHG
LYMPHOCYTES # BLD: 0.3 K/UL (ref 1–4.8)
LYMPHOCYTES # BLD: 0.4 K/UL (ref 1–4.8)
LYMPHOCYTES # BLD: 0.5 K/UL (ref 1–4.8)
LYMPHOCYTES # BLD: 0.8 K/UL (ref 1–4.8)
LYMPHOCYTES # BLD: 0.9 K/UL (ref 1–4.8)
LYMPHOCYTES # BLD: 1 K/UL (ref 1–4.8)
LYMPHOCYTES # BLD: 1.1 K/UL (ref 1–4.8)
LYMPHOCYTES # BLD: 1.1 K/UL (ref 1–4.8)
LYMPHOCYTES # BLD: 1.3 K/UL (ref 1–4.8)
LYMPHOCYTES # BLD: 1.3 K/UL (ref 1–4.8)
LYMPHOCYTES NFR BLD: 10 % (ref 20.5–51.1)
LYMPHOCYTES NFR BLD: 10 % (ref 20.5–51.1)
LYMPHOCYTES NFR BLD: 13 % (ref 20.5–51.1)
LYMPHOCYTES NFR BLD: 16 % (ref 20.5–51.1)
LYMPHOCYTES NFR BLD: 16 % (ref 20.5–51.1)
LYMPHOCYTES NFR BLD: 21 % (ref 20.5–51.1)
LYMPHOCYTES NFR BLD: 4 % (ref 20.5–51.1)
LYMPHOCYTES NFR BLD: 5 % (ref 20.5–51.1)
LYMPHOCYTES NFR BLD: 6 % (ref 20.5–51.1)
LYMPHOCYTES NFR BLD: 9 % (ref 20.5–51.1)
MAGNESIUM SERPL-MCNC: 1.5 MG/DL (ref 1.6–2.4)
MAGNESIUM SERPL-MCNC: 1.6 MG/DL (ref 1.6–2.4)
MAGNESIUM SERPL-MCNC: 1.9 MG/DL (ref 1.6–2.4)
MAGNESIUM SERPL-MCNC: 2.3 MG/DL (ref 1.6–2.4)
MCH RBC QN AUTO: 28.9 PG (ref 31–34)
MCH RBC QN AUTO: 29 PG (ref 31–34)
MCH RBC QN AUTO: 29 PG (ref 31–34)
MCH RBC QN AUTO: 29.1 PG (ref 31–34)
MCH RBC QN AUTO: 29.3 PG (ref 31–34)
MCH RBC QN AUTO: 29.3 PG (ref 31–34)
MCH RBC QN AUTO: 29.5 PG (ref 31–34)
MCH RBC QN AUTO: 29.5 PG (ref 31–34)
MCH RBC QN AUTO: 29.9 PG (ref 31–34)
MCH RBC QN AUTO: 30.2 PG (ref 31–34)
MCHC RBC AUTO-ENTMCNC: 32.8 G/DL (ref 31–36)
MCHC RBC AUTO-ENTMCNC: 33 G/DL (ref 31–36)
MCHC RBC AUTO-ENTMCNC: 33.2 G/DL (ref 31–36)
MCHC RBC AUTO-ENTMCNC: 33.2 G/DL (ref 31–36)
MCHC RBC AUTO-ENTMCNC: 33.3 G/DL (ref 31–36)
MCHC RBC AUTO-ENTMCNC: 33.3 G/DL (ref 31–36)
MCHC RBC AUTO-ENTMCNC: 33.4 G/DL (ref 31–36)
MCHC RBC AUTO-ENTMCNC: 33.6 G/DL (ref 31–36)
MCHC RBC AUTO-ENTMCNC: 33.6 G/DL (ref 31–36)
MCHC RBC AUTO-ENTMCNC: 34.4 G/DL (ref 31–36)
MCV RBC AUTO: 86.5 FL (ref 80–100)
MCV RBC AUTO: 86.6 FL (ref 80–100)
MCV RBC AUTO: 87.1 FL (ref 80–100)
MCV RBC AUTO: 87.3 FL (ref 80–100)
MCV RBC AUTO: 87.6 FL (ref 80–100)
MCV RBC AUTO: 87.7 FL (ref 80–100)
MCV RBC AUTO: 87.9 FL (ref 80–100)
MCV RBC AUTO: 89.1 FL (ref 80–100)
MCV RBC AUTO: 89.5 FL (ref 80–100)
MCV RBC AUTO: 90.6 FL (ref 80–100)
MONOCYTES # BLD: 0.3 K/UL (ref 0.2–2.4)
MONOCYTES # BLD: 0.4 K/UL (ref 0.2–2.4)
MONOCYTES # BLD: 0.7 K/UL (ref 0.2–2.4)
MONOCYTES # BLD: 0.7 K/UL (ref 0.2–2.4)
MONOCYTES NFR BLD: 11 % (ref 1.7–9.3)
MONOCYTES NFR BLD: 3 % (ref 1.7–9.3)
MONOCYTES NFR BLD: 3 % (ref 1.7–9.3)
MONOCYTES NFR BLD: 4 % (ref 1.7–9.3)
MONOCYTES NFR BLD: 6 % (ref 1.7–9.3)
MONOCYTES NFR BLD: 7 % (ref 1.7–9.3)
MONOCYTES NFR BLD: 7 % (ref 1.7–9.3)
MUCOUS THREADS URNS QL MICRO: ABNORMAL /LPF
NEUTS SEG # BLD: 10 K/UL (ref 1.8–7.7)
NEUTS SEG # BLD: 4.1 K/UL (ref 1.8–7.7)
NEUTS SEG # BLD: 4.8 K/UL (ref 1.8–7.7)
NEUTS SEG # BLD: 5.5 K/UL (ref 1.8–7.7)
NEUTS SEG # BLD: 6.7 K/UL (ref 1.8–7.7)
NEUTS SEG # BLD: 7.2 K/UL (ref 1.8–7.7)
NEUTS SEG # BLD: 7.7 K/UL (ref 1.8–7.7)
NEUTS SEG # BLD: 8.3 K/UL (ref 1.8–7.7)
NEUTS SEG NFR BLD: 63 % (ref 42–75)
NEUTS SEG NFR BLD: 75 % (ref 42–75)
NEUTS SEG NFR BLD: 80 % (ref 42–75)
NEUTS SEG NFR BLD: 80 % (ref 42–75)
NEUTS SEG NFR BLD: 84 % (ref 42–75)
NEUTS SEG NFR BLD: 86 % (ref 42–75)
NEUTS SEG NFR BLD: 86 % (ref 42–75)
NEUTS SEG NFR BLD: 91 % (ref 42–75)
NEUTS SEG NFR BLD: 92 % (ref 42–75)
NEUTS SEG NFR BLD: 92 % (ref 42–75)
NITRITE UR QL STRIP.AUTO: NEGATIVE
NITRITE UR QL STRIP.AUTO: POSITIVE
NITRITE UR QL STRIP.AUTO: POSITIVE
NRBC # BLD: 0 K/UL
NRBC # BLD: 0.01 K/UL
NRBC # BLD: 0.02 K/UL
NRBC # BLD: 0.03 K/UL
NRBC # BLD: 0.04 K/UL
NRBC # BLD: 0.04 K/UL
NRBC # BLD: 0.07 K/UL
NRBC # BLD: 0.23 K/UL
NRBC BLD-RTO: 0 PER 100 WBC
NRBC BLD-RTO: 0.1 PER 100 WBC
NRBC BLD-RTO: 0.2 PER 100 WBC
NRBC BLD-RTO: 0.4 PER 100 WBC
NRBC BLD-RTO: 0.7 PER 100 WBC
NRBC BLD-RTO: 2.7 PER 100 WBC
NRBC BLD-RTO: 20 PER 100 WBC
NRBC BLD-RTO: 30 PER 100 WBC
P-R INTERVAL, ECG05: 139 MS
P-R INTERVAL, ECG05: 149 MS
P-R INTERVAL, ECG05: 154 MS
PH UR STRIP: 5 [PH] (ref 5–8)
PH UR STRIP: 5.5 [PH] (ref 5–8)
PH UR STRIP: 6 [PH] (ref 5–8)
PLATELET # BLD AUTO: 107 K/UL
PLATELET # BLD AUTO: 110 K/UL
PLATELET # BLD AUTO: 128 K/UL
PLATELET # BLD AUTO: 143 K/UL
PLATELET # BLD AUTO: 195 K/UL
PLATELET # BLD AUTO: 59 K/UL
PLATELET # BLD AUTO: 59 K/UL
PLATELET # BLD AUTO: 82 K/UL
PLATELET # BLD AUTO: 82 K/UL
PLATELET # BLD AUTO: 88 K/UL
PMV BLD AUTO: 10.3 FL (ref 6.5–11.5)
PMV BLD AUTO: 10.5 FL (ref 6.5–11.5)
PMV BLD AUTO: 10.8 FL (ref 6.5–11.5)
PMV BLD AUTO: 8.8 FL (ref 6.5–11.5)
PMV BLD AUTO: 9.3 FL (ref 6.5–11.5)
PMV BLD AUTO: 9.3 FL (ref 6.5–11.5)
PMV BLD AUTO: 9.4 FL (ref 6.5–11.5)
PMV BLD AUTO: 9.5 FL (ref 6.5–11.5)
PMV BLD AUTO: 9.6 FL (ref 6.5–11.5)
PMV BLD AUTO: 9.9 FL (ref 6.5–11.5)
POTASSIUM SERPL-SCNC: 2.6 MMOL/L (ref 3.5–5.1)
POTASSIUM SERPL-SCNC: 2.6 MMOL/L (ref 3.5–5.1)
POTASSIUM SERPL-SCNC: 2.7 MMOL/L (ref 3.5–5.1)
POTASSIUM SERPL-SCNC: 2.7 MMOL/L (ref 3.5–5.1)
POTASSIUM SERPL-SCNC: 2.8 MMOL/L (ref 3.5–5.1)
POTASSIUM SERPL-SCNC: 3 MMOL/L (ref 3.5–5.1)
POTASSIUM SERPL-SCNC: 3.1 MMOL/L (ref 3.5–5.1)
POTASSIUM SERPL-SCNC: 3.2 MMOL/L (ref 3.5–5.1)
POTASSIUM SERPL-SCNC: 3.5 MMOL/L (ref 3.5–5.1)
POTASSIUM SERPL-SCNC: 3.6 MMOL/L (ref 3.5–5.1)
POTASSIUM SERPL-SCNC: 3.7 MMOL/L (ref 3.5–5.1)
POTASSIUM SERPL-SCNC: 4 MMOL/L (ref 3.5–5.1)
POTASSIUM SERPL-SCNC: 4 MMOL/L (ref 3.5–5.1)
POTASSIUM SERPL-SCNC: 4.1 MMOL/L (ref 3.5–5.1)
POTASSIUM SERPL-SCNC: 4.3 MMOL/L (ref 3.5–5.1)
PROCALCITONIN SERPL-MCNC: <0.05 NG/ML
PROT SERPL-MCNC: 5.9 G/DL (ref 6.4–8.2)
PROT SERPL-MCNC: 7.2 G/DL (ref 6.4–8.2)
PROT SERPL-MCNC: 8.6 G/DL (ref 6.4–8.2)
PROT SERPL-MCNC: 8.9 G/DL (ref 6.4–8.2)
PROT UR STRIP-MCNC: 30 MG/DL
PROT UR STRIP-MCNC: 30 MG/DL
PROT UR STRIP-MCNC: ABNORMAL MG/DL
PROTHROMBIN TIME: 12.8 SEC (ref 9–11.1)
Q-T INTERVAL, ECG07: 335 MS
Q-T INTERVAL, ECG07: 359 MS
Q-T INTERVAL, ECG07: 361 MS
QRS DURATION, ECG06: 80 MS
QRS DURATION, ECG06: 86 MS
QRS DURATION, ECG06: 88 MS
QTC CALCULATION (BEZET), ECG08: 445 MS
QTC CALCULATION (BEZET), ECG08: 459 MS
QTC CALCULATION (BEZET), ECG08: 487 MS
RBC # BLD AUTO: 4.28 M/UL (ref 4.5–5.9)
RBC # BLD AUTO: 4.3 M/UL (ref 4.5–5.9)
RBC # BLD AUTO: 4.38 M/UL (ref 4.5–5.9)
RBC # BLD AUTO: 4.4 M/UL (ref 4.5–5.9)
RBC # BLD AUTO: 4.5 M/UL (ref 4.5–5.9)
RBC # BLD AUTO: 4.8 M/UL (ref 4.5–5.9)
RBC # BLD AUTO: 4.91 M/UL (ref 4.5–5.9)
RBC # BLD AUTO: 5.83 M/UL (ref 4.5–5.9)
RBC # BLD AUTO: 6.22 M/UL (ref 4.5–5.9)
RBC # BLD AUTO: 6.36 M/UL (ref 4.5–5.9)
RBC #/AREA URNS HPF: >100 /HPF (ref 0–3)
RBC #/AREA URNS HPF: ABNORMAL /HPF (ref 0–3)
RBC #/AREA URNS HPF: ABNORMAL /HPF (ref 0–3)
SARS-COV-2, COV2: NORMAL
SARS-COV-2, COV2NT: DETECTED
SARS-COV-2, COV2NT: NOT DETECTED
SODIUM SERPL-SCNC: 140 MMOL/L (ref 136–145)
SODIUM SERPL-SCNC: 141 MMOL/L (ref 136–145)
SODIUM SERPL-SCNC: 142 MMOL/L (ref 136–145)
SODIUM SERPL-SCNC: 144 MMOL/L (ref 136–145)
SODIUM SERPL-SCNC: 144 MMOL/L (ref 136–145)
SODIUM SERPL-SCNC: 145 MMOL/L (ref 136–145)
SODIUM SERPL-SCNC: 146 MMOL/L (ref 136–145)
SODIUM SERPL-SCNC: 147 MMOL/L (ref 136–145)
SODIUM SERPL-SCNC: 149 MMOL/L (ref 136–145)
SODIUM SERPL-SCNC: 149 MMOL/L (ref 136–145)
SODIUM SERPL-SCNC: 152 MMOL/L (ref 136–145)
SODIUM SERPL-SCNC: 152 MMOL/L (ref 136–145)
SODIUM SERPL-SCNC: 153 MMOL/L (ref 136–145)
SODIUM SERPL-SCNC: 153 MMOL/L (ref 136–145)
SODIUM SERPL-SCNC: 157 MMOL/L (ref 136–145)
SODIUM SERPL-SCNC: 158 MMOL/L (ref 136–145)
SODIUM SERPL-SCNC: 160 MMOL/L (ref 136–145)
SP GR UR REFRACTOMETRY: 1.02 (ref 1–1.03)
SP GR UR REFRACTOMETRY: 1.02 (ref 1–1.03)
SP GR UR REFRACTOMETRY: >1.03 (ref 1–1.03)
SPECIMEN EXP DATE BLD: NORMAL
SPECIMEN SOURCE: ABNORMAL
SPECIMEN SOURCE: ABNORMAL
SPECIMEN SOURCE: NORMAL
THERAPEUTIC RANGE,PTTT: NORMAL SEC (ref 68–109)
TROPONIN I SERPL-MCNC: 0.13 NG/ML
TROPONIN I SERPL-MCNC: 0.14 NG/ML
TROPONIN I SERPL-MCNC: 0.17 NG/ML
TROPONIN I SERPL-MCNC: <0.05 NG/ML
UROBILINOGEN UR QL STRIP.AUTO: 1 EU/DL (ref 0.2–1)
UROBILINOGEN UR QL STRIP.AUTO: 1 EU/DL (ref 0.2–1)
UROBILINOGEN UR QL STRIP.AUTO: 2 EU/DL (ref 0.2–1)
VENTRICULAR RATE, ECG03: 106 BPM
VENTRICULAR RATE, ECG03: 109 BPM
VENTRICULAR RATE, ECG03: 98 BPM
WBC # BLD AUTO: 11.7 K/UL (ref 4.4–11.3)
WBC # BLD AUTO: 6 K/UL (ref 4.4–11.3)
WBC # BLD AUTO: 6.4 K/UL (ref 4.4–11.3)
WBC # BLD AUTO: 7.2 K/UL (ref 4.4–11.3)
WBC # BLD AUTO: 7.9 K/UL (ref 4.4–11.3)
WBC # BLD AUTO: 8.3 K/UL (ref 4.4–11.3)
WBC # BLD AUTO: 9 K/UL (ref 4.4–11.3)
WBC # BLD AUTO: 9.1 K/UL (ref 4.4–11.3)
WBC # BLD AUTO: 9.3 K/UL (ref 4.4–11.3)
WBC # BLD AUTO: 9.7 K/UL (ref 4.4–11.3)
WBC URNS QL MICRO: ABNORMAL /HPF (ref 0–5)

## 2021-01-01 PROCEDURE — 94762 N-INVAS EAR/PLS OXIMTRY CONT: CPT

## 2021-01-01 PROCEDURE — 74011250636 HC RX REV CODE- 250/636: Performed by: EMERGENCY MEDICINE

## 2021-01-01 PROCEDURE — 74011250636 HC RX REV CODE- 250/636

## 2021-01-01 PROCEDURE — 80048 BASIC METABOLIC PNL TOTAL CA: CPT

## 2021-01-01 PROCEDURE — 65270000029 HC RM PRIVATE

## 2021-01-01 PROCEDURE — 74011250637 HC RX REV CODE- 250/637: Performed by: FAMILY MEDICINE

## 2021-01-01 PROCEDURE — 80053 COMPREHEN METABOLIC PANEL: CPT

## 2021-01-01 PROCEDURE — 74011250637 HC RX REV CODE- 250/637: Performed by: EMERGENCY MEDICINE

## 2021-01-01 PROCEDURE — 81001 URINALYSIS AUTO W/SCOPE: CPT

## 2021-01-01 PROCEDURE — 85379 FIBRIN DEGRADATION QUANT: CPT

## 2021-01-01 PROCEDURE — 96361 HYDRATE IV INFUSION ADD-ON: CPT

## 2021-01-01 PROCEDURE — 87635 SARS-COV-2 COVID-19 AMP PRB: CPT

## 2021-01-01 PROCEDURE — C9113 INJ PANTOPRAZOLE SODIUM, VIA: HCPCS | Performed by: FAMILY MEDICINE

## 2021-01-01 PROCEDURE — 36415 COLL VENOUS BLD VENIPUNCTURE: CPT

## 2021-01-01 PROCEDURE — 99284 EMERGENCY DEPT VISIT MOD MDM: CPT

## 2021-01-01 PROCEDURE — 82040 ASSAY OF SERUM ALBUMIN: CPT

## 2021-01-01 PROCEDURE — 74011250636 HC RX REV CODE- 250/636: Performed by: FAMILY MEDICINE

## 2021-01-01 PROCEDURE — 96365 THER/PROPH/DIAG IV INF INIT: CPT

## 2021-01-01 PROCEDURE — 85025 COMPLETE CBC W/AUTO DIFF WBC: CPT

## 2021-01-01 PROCEDURE — 83735 ASSAY OF MAGNESIUM: CPT

## 2021-01-01 PROCEDURE — 84484 ASSAY OF TROPONIN QUANT: CPT

## 2021-01-01 PROCEDURE — 84520 ASSAY OF UREA NITROGEN: CPT

## 2021-01-01 PROCEDURE — 74011250637 HC RX REV CODE- 250/637: Performed by: HOSPITALIST

## 2021-01-01 PROCEDURE — 85018 HEMOGLOBIN: CPT

## 2021-01-01 PROCEDURE — 99283 EMERGENCY DEPT VISIT LOW MDM: CPT

## 2021-01-01 PROCEDURE — 74011250636 HC RX REV CODE- 250/636: Performed by: NURSE PRACTITIONER

## 2021-01-01 PROCEDURE — 74011000258 HC RX REV CODE- 258: Performed by: EMERGENCY MEDICINE

## 2021-01-01 PROCEDURE — 74011000250 HC RX REV CODE- 250: Performed by: FAMILY MEDICINE

## 2021-01-01 PROCEDURE — 86140 C-REACTIVE PROTEIN: CPT

## 2021-01-01 PROCEDURE — U0003 INFECTIOUS AGENT DETECTION BY NUCLEIC ACID (DNA OR RNA); SEVERE ACUTE RESPIRATORY SYNDROME CORONAVIRUS 2 (SARS-COV-2) (CORONAVIRUS DISEASE [COVID-19]), AMPLIFIED PROBE TECHNIQUE, MAKING USE OF HIGH THROUGHPUT TECHNOLOGIES AS DESCRIBED BY CMS-2020-01-R: HCPCS

## 2021-01-01 PROCEDURE — 74011250637 HC RX REV CODE- 250/637: Performed by: INTERNAL MEDICINE

## 2021-01-01 PROCEDURE — 96372 THER/PROPH/DIAG INJ SC/IM: CPT

## 2021-01-01 PROCEDURE — 99285 EMERGENCY DEPT VISIT HI MDM: CPT

## 2021-01-01 PROCEDURE — 93005 ELECTROCARDIOGRAM TRACING: CPT

## 2021-01-01 PROCEDURE — 99218 HC RM OBSERVATION: CPT

## 2021-01-01 PROCEDURE — 83605 ASSAY OF LACTIC ACID: CPT

## 2021-01-01 PROCEDURE — 93306 TTE W/DOPPLER COMPLETE: CPT

## 2021-01-01 PROCEDURE — 74011250637 HC RX REV CODE- 250/637: Performed by: NURSE PRACTITIONER

## 2021-01-01 PROCEDURE — 85384 FIBRINOGEN ACTIVITY: CPT

## 2021-01-01 PROCEDURE — 85730 THROMBOPLASTIN TIME PARTIAL: CPT

## 2021-01-01 PROCEDURE — 99291 CRITICAL CARE FIRST HOUR: CPT

## 2021-01-01 PROCEDURE — 82728 ASSAY OF FERRITIN: CPT

## 2021-01-01 PROCEDURE — 85610 PROTHROMBIN TIME: CPT

## 2021-01-01 PROCEDURE — 92610 EVALUATE SWALLOWING FUNCTION: CPT

## 2021-01-01 PROCEDURE — 74011250636 HC RX REV CODE- 250/636: Performed by: INTERNAL MEDICINE

## 2021-01-01 PROCEDURE — C9113 INJ PANTOPRAZOLE SODIUM, VIA: HCPCS | Performed by: NURSE PRACTITIONER

## 2021-01-01 PROCEDURE — 71045 X-RAY EXAM CHEST 1 VIEW: CPT

## 2021-01-01 PROCEDURE — 82272 OCCULT BLD FECES 1-3 TESTS: CPT

## 2021-01-01 PROCEDURE — 74011636637 HC RX REV CODE- 636/637: Performed by: HOSPITALIST

## 2021-01-01 PROCEDURE — 97161 PT EVAL LOW COMPLEX 20 MIN: CPT

## 2021-01-01 PROCEDURE — 74011000258 HC RX REV CODE- 258: Performed by: NURSE PRACTITIONER

## 2021-01-01 PROCEDURE — 82306 VITAMIN D 25 HYDROXY: CPT

## 2021-01-01 PROCEDURE — 96368 THER/DIAG CONCURRENT INF: CPT

## 2021-01-01 PROCEDURE — 74011000250 HC RX REV CODE- 250: Performed by: NURSE PRACTITIONER

## 2021-01-01 PROCEDURE — 96374 THER/PROPH/DIAG INJ IV PUSH: CPT

## 2021-01-01 PROCEDURE — 74011250636 HC RX REV CODE- 250/636: Performed by: HOSPITALIST

## 2021-01-01 PROCEDURE — 84145 PROCALCITONIN (PCT): CPT

## 2021-01-01 PROCEDURE — 83615 LACTATE (LD) (LDH) ENZYME: CPT

## 2021-01-01 PROCEDURE — 86901 BLOOD TYPING SEROLOGIC RH(D): CPT

## 2021-01-01 PROCEDURE — 75810000304 HC PLACE NEED INTRAOSSEOUS INFUS

## 2021-01-01 PROCEDURE — 96376 TX/PRO/DX INJ SAME DRUG ADON: CPT

## 2021-01-01 RX ORDER — POTASSIUM CHLORIDE 750 MG/1
20 TABLET, FILM COATED, EXTENDED RELEASE ORAL
Status: DISPENSED | OUTPATIENT
Start: 2021-01-01 | End: 2021-01-01

## 2021-01-01 RX ORDER — POTASSIUM CHLORIDE 20 MEQ/1
20 TABLET, EXTENDED RELEASE ORAL 2 TIMES DAILY
Status: ON HOLD | COMMUNITY
End: 2021-01-01 | Stop reason: SDUPTHER

## 2021-01-01 RX ORDER — FAMOTIDINE 20 MG/1
20 TABLET, FILM COATED ORAL 2 TIMES DAILY
Status: DISCONTINUED | OUTPATIENT
Start: 2021-01-01 | End: 2021-01-01

## 2021-01-01 RX ORDER — ATORVASTATIN CALCIUM 20 MG/1
20 TABLET, FILM COATED ORAL DAILY
COMMUNITY

## 2021-01-01 RX ORDER — POTASSIUM CHLORIDE 20 MEQ/1
40 TABLET, EXTENDED RELEASE ORAL 2 TIMES DAILY
Qty: 120 TAB | Refills: 0 | Status: SHIPPED | OUTPATIENT
Start: 2021-01-01

## 2021-01-01 RX ORDER — SODIUM CHLORIDE 9 MG/ML
125 INJECTION, SOLUTION INTRAVENOUS CONTINUOUS
Status: DISCONTINUED | OUTPATIENT
Start: 2021-01-01 | End: 2021-01-01 | Stop reason: HOSPADM

## 2021-01-01 RX ORDER — SODIUM CHLORIDE 450 MG/100ML
125 INJECTION, SOLUTION INTRAVENOUS CONTINUOUS
Status: DISCONTINUED | OUTPATIENT
Start: 2021-01-01 | End: 2021-01-01

## 2021-01-01 RX ORDER — NYSTATIN 100000 [USP'U]/ML
500000 SUSPENSION ORAL 4 TIMES DAILY
Qty: 200 ML | Refills: 0 | Status: SHIPPED | OUTPATIENT
Start: 2021-01-01 | End: 2021-01-01

## 2021-01-01 RX ORDER — ENOXAPARIN SODIUM 100 MG/ML
30 INJECTION SUBCUTANEOUS DAILY
Status: DISCONTINUED | OUTPATIENT
Start: 2021-01-01 | End: 2021-01-01

## 2021-01-01 RX ORDER — ATORVASTATIN CALCIUM 20 MG/1
20 TABLET, FILM COATED ORAL DAILY
Status: DISCONTINUED | OUTPATIENT
Start: 2021-01-01 | End: 2021-01-01 | Stop reason: HOSPADM

## 2021-01-01 RX ORDER — ASCORBIC ACID 500 MG
500 TABLET ORAL 2 TIMES DAILY
Qty: 60 TAB | Refills: 0 | Status: SHIPPED | OUTPATIENT
Start: 2021-01-01

## 2021-01-01 RX ORDER — ATORVASTATIN CALCIUM 20 MG/1
20 TABLET, FILM COATED ORAL
Status: DISCONTINUED | OUTPATIENT
Start: 2021-01-01 | End: 2021-01-01 | Stop reason: HOSPADM

## 2021-01-01 RX ORDER — TAMSULOSIN HYDROCHLORIDE 0.4 MG/1
0.4 CAPSULE ORAL DAILY
Status: DISCONTINUED | OUTPATIENT
Start: 2021-01-01 | End: 2021-01-01 | Stop reason: HOSPADM

## 2021-01-01 RX ORDER — TAMSULOSIN HYDROCHLORIDE 0.4 MG/1
0.4 CAPSULE ORAL DAILY
COMMUNITY

## 2021-01-01 RX ORDER — METOPROLOL TARTRATE 50 MG/1
50 TABLET ORAL 2 TIMES DAILY
Status: DISCONTINUED | OUTPATIENT
Start: 2021-01-01 | End: 2021-01-01

## 2021-01-01 RX ORDER — LANOLIN ALCOHOL/MO/W.PET/CERES
400 CREAM (GRAM) TOPICAL 2 TIMES DAILY
Status: DISCONTINUED | OUTPATIENT
Start: 2021-01-01 | End: 2021-01-01 | Stop reason: HOSPADM

## 2021-01-01 RX ORDER — ZINC SULFATE 50(220)MG
220 CAPSULE ORAL DAILY
Qty: 30 CAP | Refills: 0 | Status: SHIPPED | OUTPATIENT
Start: 2021-01-01

## 2021-01-01 RX ORDER — ASCORBIC ACID 500 MG
500 TABLET ORAL 2 TIMES DAILY
Status: DISCONTINUED | OUTPATIENT
Start: 2021-01-01 | End: 2021-01-01

## 2021-01-01 RX ORDER — HALOPERIDOL 5 MG/ML
2 INJECTION INTRAMUSCULAR
Status: DISCONTINUED | OUTPATIENT
Start: 2021-01-01 | End: 2021-01-01 | Stop reason: HOSPADM

## 2021-01-01 RX ORDER — PANTOPRAZOLE SODIUM 40 MG/1
40 TABLET, DELAYED RELEASE ORAL DAILY
Status: DISCONTINUED | OUTPATIENT
Start: 2021-01-01 | End: 2021-01-01

## 2021-01-01 RX ORDER — PANTOPRAZOLE SODIUM 40 MG/1
40 TABLET, DELAYED RELEASE ORAL DAILY
COMMUNITY

## 2021-01-01 RX ORDER — ACETAMINOPHEN 650 MG/1
650 SUPPOSITORY RECTAL
Status: DISCONTINUED | OUTPATIENT
Start: 2021-01-01 | End: 2021-01-01 | Stop reason: HOSPADM

## 2021-01-01 RX ORDER — ENOXAPARIN SODIUM 100 MG/ML
60 INJECTION SUBCUTANEOUS ONCE
Status: COMPLETED | OUTPATIENT
Start: 2021-01-01 | End: 2021-01-01

## 2021-01-01 RX ORDER — POTASSIUM CHLORIDE 20 MEQ/1
40 TABLET, EXTENDED RELEASE ORAL 2 TIMES DAILY
Status: DISCONTINUED | OUTPATIENT
Start: 2021-01-01 | End: 2021-01-01 | Stop reason: HOSPADM

## 2021-01-01 RX ORDER — ZINC SULFATE 50(220)MG
1 CAPSULE ORAL DAILY
Status: DISCONTINUED | OUTPATIENT
Start: 2021-01-01 | End: 2021-01-01 | Stop reason: HOSPADM

## 2021-01-01 RX ORDER — ACETAMINOPHEN 500 MG
2000 TABLET ORAL DAILY
Status: DISCONTINUED | OUTPATIENT
Start: 2021-01-01 | End: 2021-01-01 | Stop reason: HOSPADM

## 2021-01-01 RX ORDER — METOPROLOL TARTRATE 50 MG/1
TABLET ORAL 2 TIMES DAILY
COMMUNITY
End: 2021-01-01

## 2021-01-01 RX ORDER — SODIUM CHLORIDE 0.9 % (FLUSH) 0.9 %
5-40 SYRINGE (ML) INJECTION EVERY 8 HOURS
Status: DISCONTINUED | OUTPATIENT
Start: 2021-01-01 | End: 2021-01-01 | Stop reason: HOSPADM

## 2021-01-01 RX ORDER — PANTOPRAZOLE SODIUM 40 MG/1
40 TABLET, DELAYED RELEASE ORAL
Status: DISCONTINUED | OUTPATIENT
Start: 2021-01-01 | End: 2021-01-01 | Stop reason: HOSPADM

## 2021-01-01 RX ORDER — NYSTATIN 100000 [USP'U]/ML
500000 SUSPENSION ORAL 4 TIMES DAILY
Status: DISCONTINUED | OUTPATIENT
Start: 2021-01-01 | End: 2021-01-01 | Stop reason: HOSPADM

## 2021-01-01 RX ORDER — POLYETHYLENE GLYCOL 3350 17 G/17G
17 POWDER, FOR SOLUTION ORAL DAILY PRN
Status: DISCONTINUED | OUTPATIENT
Start: 2021-01-01 | End: 2021-01-01 | Stop reason: HOSPADM

## 2021-01-01 RX ORDER — GUAIFENESIN 100 MG/5ML
81 LIQUID (ML) ORAL
Status: COMPLETED | OUTPATIENT
Start: 2021-01-01 | End: 2021-01-01

## 2021-01-01 RX ORDER — DEXTROSE, SODIUM CHLORIDE, AND POTASSIUM CHLORIDE 5; .45; .15 G/100ML; G/100ML; G/100ML
75 INJECTION INTRAVENOUS CONTINUOUS
Status: DISCONTINUED | OUTPATIENT
Start: 2021-01-01 | End: 2021-01-01

## 2021-01-01 RX ORDER — SODIUM CHLORIDE, SODIUM LACTATE, POTASSIUM CHLORIDE, CALCIUM CHLORIDE 600; 310; 30; 20 MG/100ML; MG/100ML; MG/100ML; MG/100ML
150 INJECTION, SOLUTION INTRAVENOUS CONTINUOUS
Status: DISCONTINUED | OUTPATIENT
Start: 2021-01-01 | End: 2021-01-01

## 2021-01-01 RX ORDER — POTASSIUM CHLORIDE 7.45 MG/ML
10 INJECTION INTRAVENOUS
Status: COMPLETED | OUTPATIENT
Start: 2021-01-01 | End: 2021-01-01

## 2021-01-01 RX ORDER — SODIUM CHLORIDE 450 MG/100ML
150 INJECTION, SOLUTION INTRAVENOUS CONTINUOUS
Status: DISCONTINUED | OUTPATIENT
Start: 2021-01-01 | End: 2021-01-01 | Stop reason: HOSPADM

## 2021-01-01 RX ORDER — ENOXAPARIN SODIUM 100 MG/ML
30 INJECTION SUBCUTANEOUS EVERY 12 HOURS
Status: DISCONTINUED | OUTPATIENT
Start: 2021-01-01 | End: 2021-01-01

## 2021-01-01 RX ORDER — SODIUM CHLORIDE 0.9 % (FLUSH) 0.9 %
5-40 SYRINGE (ML) INJECTION AS NEEDED
Status: DISCONTINUED | OUTPATIENT
Start: 2021-01-01 | End: 2021-01-01 | Stop reason: HOSPADM

## 2021-01-01 RX ORDER — ACETAMINOPHEN 325 MG/1
650 TABLET ORAL
Status: DISCONTINUED | OUTPATIENT
Start: 2021-01-01 | End: 2021-01-01 | Stop reason: HOSPADM

## 2021-01-01 RX ORDER — HEPARIN SODIUM 5000 [USP'U]/ML
5000 INJECTION, SOLUTION INTRAVENOUS; SUBCUTANEOUS EVERY 12 HOURS
Status: DISCONTINUED | OUTPATIENT
Start: 2021-01-01 | End: 2021-01-01

## 2021-01-01 RX ORDER — ACETAMINOPHEN 325 MG/1
325 TABLET ORAL
COMMUNITY

## 2021-01-01 RX ORDER — HEPARIN SODIUM 5000 [USP'U]/ML
5000 INJECTION, SOLUTION INTRAVENOUS; SUBCUTANEOUS EVERY 8 HOURS
Status: DISCONTINUED | OUTPATIENT
Start: 2021-01-01 | End: 2021-01-01

## 2021-01-01 RX ORDER — DEXTROSE AND POTASSIUM CHLORIDE 5; .15 G/100ML; G/100ML
SOLUTION INTRAVENOUS CONTINUOUS
Status: DISCONTINUED | OUTPATIENT
Start: 2021-01-01 | End: 2021-01-01 | Stop reason: HOSPADM

## 2021-01-01 RX ORDER — PREDNISONE 20 MG/1
20 TABLET ORAL
Status: DISCONTINUED | OUTPATIENT
Start: 2021-01-01 | End: 2021-01-01

## 2021-01-01 RX ORDER — METOPROLOL TARTRATE 50 MG/1
50 TABLET ORAL EVERY 12 HOURS
Status: DISCONTINUED | OUTPATIENT
Start: 2021-01-01 | End: 2021-01-01

## 2021-01-01 RX ORDER — ACETAMINOPHEN 500 MG
2000 TABLET ORAL DAILY
Qty: 30 CAP | Refills: 0 | Status: SHIPPED | OUTPATIENT
Start: 2021-01-01

## 2021-01-01 RX ORDER — LORAZEPAM 1 MG/1
1 TABLET ORAL
Status: DISCONTINUED | OUTPATIENT
Start: 2021-01-01 | End: 2021-01-01 | Stop reason: HOSPADM

## 2021-01-01 RX ORDER — POTASSIUM CHLORIDE 7.45 MG/ML
10 INJECTION INTRAVENOUS
Status: DISCONTINUED | OUTPATIENT
Start: 2021-01-01 | End: 2021-01-01

## 2021-01-01 RX ORDER — DEXTROSE MONOHYDRATE 50 MG/ML
INJECTION, SOLUTION INTRAVENOUS
Status: COMPLETED
Start: 2021-01-01 | End: 2021-01-01

## 2021-01-01 RX ORDER — GUAIFENESIN/DEXTROMETHORPHAN 100-10MG/5
5 SYRUP ORAL
Status: DISCONTINUED | OUTPATIENT
Start: 2021-01-01 | End: 2021-01-01 | Stop reason: HOSPADM

## 2021-01-01 RX ORDER — DEXTROSE MONOHYDRATE 50 MG/ML
100 INJECTION, SOLUTION INTRAVENOUS CONTINUOUS
Status: DISCONTINUED | OUTPATIENT
Start: 2021-01-01 | End: 2021-01-01 | Stop reason: HOSPADM

## 2021-01-01 RX ORDER — PROMETHAZINE HYDROCHLORIDE 25 MG/1
12.5 TABLET ORAL
Status: DISCONTINUED | OUTPATIENT
Start: 2021-01-01 | End: 2021-01-01 | Stop reason: HOSPADM

## 2021-01-01 RX ORDER — SODIUM CHLORIDE 9 MG/ML
150 INJECTION, SOLUTION INTRAVENOUS ONCE
Status: COMPLETED | OUTPATIENT
Start: 2021-01-01 | End: 2021-01-01

## 2021-01-01 RX ORDER — SODIUM CHLORIDE AND POTASSIUM CHLORIDE .9; .15 G/100ML; G/100ML
SOLUTION INTRAVENOUS CONTINUOUS
Status: DISCONTINUED | OUTPATIENT
Start: 2021-01-01 | End: 2021-01-01

## 2021-01-01 RX ORDER — POTASSIUM CHLORIDE 20 MEQ/1
40 TABLET, EXTENDED RELEASE ORAL EVERY 4 HOURS
Status: COMPLETED | OUTPATIENT
Start: 2021-01-01 | End: 2021-01-01

## 2021-01-01 RX ORDER — SODIUM CHLORIDE 9 MG/ML
500 INJECTION, SOLUTION INTRAVENOUS ONCE
Status: DISCONTINUED | OUTPATIENT
Start: 2021-01-01 | End: 2021-01-01 | Stop reason: HOSPADM

## 2021-01-01 RX ORDER — DOXYCYCLINE 100 MG/1
100 TABLET ORAL 2 TIMES DAILY
Qty: 14 TAB | Refills: 0 | Status: SHIPPED | OUTPATIENT
Start: 2021-01-01 | End: 2021-01-01

## 2021-01-01 RX ORDER — ASCORBIC ACID 500 MG
500 TABLET ORAL 2 TIMES DAILY WITH MEALS
Status: DISCONTINUED | OUTPATIENT
Start: 2021-01-01 | End: 2021-01-01 | Stop reason: HOSPADM

## 2021-01-01 RX ORDER — POTASSIUM CHLORIDE 20 MEQ/1
40 TABLET, EXTENDED RELEASE ORAL ONCE
Status: COMPLETED | OUTPATIENT
Start: 2021-01-01 | End: 2021-01-01

## 2021-01-01 RX ORDER — MELATONIN
2000 DAILY
Status: DISCONTINUED | OUTPATIENT
Start: 2021-01-01 | End: 2021-01-01

## 2021-01-01 RX ORDER — MAGNESIUM SULFATE HEPTAHYDRATE 40 MG/ML
2 INJECTION, SOLUTION INTRAVENOUS ONCE
Status: DISCONTINUED | OUTPATIENT
Start: 2021-01-01 | End: 2021-01-01

## 2021-01-01 RX ORDER — POTASSIUM CHLORIDE 20 MEQ/1
40 TABLET, EXTENDED RELEASE ORAL 2 TIMES DAILY
Status: DISCONTINUED | OUTPATIENT
Start: 2021-01-01 | End: 2021-01-01

## 2021-01-01 RX ORDER — ONDANSETRON 2 MG/ML
4 INJECTION INTRAMUSCULAR; INTRAVENOUS
Status: DISCONTINUED | OUTPATIENT
Start: 2021-01-01 | End: 2021-01-01 | Stop reason: HOSPADM

## 2021-01-01 RX ORDER — LANOLIN ALCOHOL/MO/W.PET/CERES
400 CREAM (GRAM) TOPICAL 2 TIMES DAILY
Qty: 60 TAB | Refills: 0 | Status: SHIPPED | OUTPATIENT
Start: 2021-01-01

## 2021-01-01 RX ADMIN — SODIUM CHLORIDE 1000 ML: 9 INJECTION, SOLUTION INTRAVENOUS at 23:26

## 2021-01-01 RX ADMIN — AZITHROMYCIN DIHYDRATE 500 MG: 500 INJECTION, POWDER, LYOPHILIZED, FOR SOLUTION INTRAVENOUS at 00:42

## 2021-01-01 RX ADMIN — Medication 10 ML: at 21:06

## 2021-01-01 RX ADMIN — ZINC SULFATE 220 MG (50 MG) CAPSULE 1 CAPSULE: CAPSULE at 09:01

## 2021-01-01 RX ADMIN — FAMOTIDINE 20 MG: 20 TABLET ORAL at 09:00

## 2021-01-01 RX ADMIN — HEPARIN SODIUM 5000 UNITS: 5000 INJECTION INTRAVENOUS; SUBCUTANEOUS at 02:13

## 2021-01-01 RX ADMIN — SODIUM CHLORIDE 40 MG: 9 INJECTION INTRAMUSCULAR; INTRAVENOUS; SUBCUTANEOUS at 20:21

## 2021-01-01 RX ADMIN — SODIUM CHLORIDE 150 ML/HR: 4.5 INJECTION, SOLUTION INTRAVENOUS at 23:24

## 2021-01-01 RX ADMIN — PANTOPRAZOLE SODIUM 40 MG: 40 INJECTION, POWDER, FOR SOLUTION INTRAVENOUS at 23:31

## 2021-01-01 RX ADMIN — AZITHROMYCIN DIHYDRATE 500 MG: 500 INJECTION, POWDER, LYOPHILIZED, FOR SOLUTION INTRAVENOUS at 00:40

## 2021-01-01 RX ADMIN — SODIUM CHLORIDE 125 ML/HR: 4.5 INJECTION, SOLUTION INTRAVENOUS at 09:58

## 2021-01-01 RX ADMIN — SODIUM CHLORIDE 1000 ML: 9 INJECTION, SOLUTION INTRAVENOUS at 19:00

## 2021-01-01 RX ADMIN — SODIUM CHLORIDE 125 ML/HR: 4.5 INJECTION, SOLUTION INTRAVENOUS at 17:55

## 2021-01-01 RX ADMIN — SODIUM CHLORIDE 150 ML/HR: 9 INJECTION, SOLUTION INTRAVENOUS at 20:32

## 2021-01-01 RX ADMIN — Medication 10 ML: at 06:20

## 2021-01-01 RX ADMIN — TAMSULOSIN HYDROCHLORIDE 0.4 MG: 0.4 CAPSULE ORAL at 09:47

## 2021-01-01 RX ADMIN — LORAZEPAM 1 MG: 1 TABLET ORAL at 16:46

## 2021-01-01 RX ADMIN — Medication 10 ML: at 15:31

## 2021-01-01 RX ADMIN — Medication 10 ML: at 20:50

## 2021-01-01 RX ADMIN — POTASSIUM CHLORIDE 10 MEQ: 10 INJECTION, SOLUTION INTRAVENOUS at 11:38

## 2021-01-01 RX ADMIN — ZINC SULFATE 220 MG (50 MG) CAPSULE 1 CAPSULE: CAPSULE at 10:05

## 2021-01-01 RX ADMIN — NYSTATIN 500000 UNITS: 100000 SUSPENSION ORAL at 17:20

## 2021-01-01 RX ADMIN — Medication 10 ML: at 06:18

## 2021-01-01 RX ADMIN — AMOXICILLIN AND CLAVULANATE POTASSIUM 1 TABLET: 875; 125 TABLET, FILM COATED ORAL at 17:47

## 2021-01-01 RX ADMIN — PREDNISONE 20 MG: 20 TABLET ORAL at 11:31

## 2021-01-01 RX ADMIN — HALOPERIDOL LACTATE 2 MG: 5 INJECTION, SOLUTION INTRAMUSCULAR at 04:36

## 2021-01-01 RX ADMIN — Medication 10 ML: at 22:51

## 2021-01-01 RX ADMIN — NYSTATIN 500000 UNITS: 100000 SUSPENSION ORAL at 09:02

## 2021-01-01 RX ADMIN — DEXTROSE MONOHYDRATE 100 ML/HR: 50 INJECTION, SOLUTION INTRAVENOUS at 22:18

## 2021-01-01 RX ADMIN — DEXTROSE MONOHYDRATE 100 ML/HR: 50 INJECTION, SOLUTION INTRAVENOUS at 15:37

## 2021-01-01 RX ADMIN — METOPROLOL SUCCINATE 50 MG: 50 TABLET, EXTENDED RELEASE ORAL at 09:47

## 2021-01-01 RX ADMIN — Medication 10 ML: at 20:28

## 2021-01-01 RX ADMIN — NYSTATIN 500000 UNITS: 100000 SUSPENSION ORAL at 22:27

## 2021-01-01 RX ADMIN — METHYLPREDNISOLONE SODIUM SUCCINATE 40 MG: 40 INJECTION, POWDER, FOR SOLUTION INTRAMUSCULAR; INTRAVENOUS at 09:44

## 2021-01-01 RX ADMIN — SODIUM CHLORIDE 125 ML/HR: 4.5 INJECTION, SOLUTION INTRAVENOUS at 02:15

## 2021-01-01 RX ADMIN — Medication 10 ML: at 21:30

## 2021-01-01 RX ADMIN — POTASSIUM CHLORIDE 40 MEQ: 1500 TABLET, EXTENDED RELEASE ORAL at 13:23

## 2021-01-01 RX ADMIN — Medication 10 ML: at 15:43

## 2021-01-01 RX ADMIN — SODIUM CHLORIDE 40 MG: 9 INJECTION INTRAMUSCULAR; INTRAVENOUS; SUBCUTANEOUS at 14:56

## 2021-01-01 RX ADMIN — Medication 5 ML: at 14:00

## 2021-01-01 RX ADMIN — SODIUM CHLORIDE 40 MG: 9 INJECTION INTRAMUSCULAR; INTRAVENOUS; SUBCUTANEOUS at 22:13

## 2021-01-01 RX ADMIN — Medication 10 ML: at 14:03

## 2021-01-01 RX ADMIN — POTASSIUM CHLORIDE 10 MEQ: 7.46 INJECTION, SOLUTION INTRAVENOUS at 10:40

## 2021-01-01 RX ADMIN — FAMOTIDINE 20 MG: 20 TABLET ORAL at 09:11

## 2021-01-01 RX ADMIN — SODIUM CHLORIDE 40 MG: 9 INJECTION INTRAMUSCULAR; INTRAVENOUS; SUBCUTANEOUS at 20:33

## 2021-01-01 RX ADMIN — METHYLPREDNISOLONE SODIUM SUCCINATE 40 MG: 40 INJECTION, POWDER, FOR SOLUTION INTRAMUSCULAR; INTRAVENOUS at 09:00

## 2021-01-01 RX ADMIN — POTASSIUM CHLORIDE 40 MEQ: 1500 TABLET, EXTENDED RELEASE ORAL at 11:31

## 2021-01-01 RX ADMIN — FAMOTIDINE 20 MG: 20 TABLET ORAL at 09:44

## 2021-01-01 RX ADMIN — FAMOTIDINE 20 MG: 20 TABLET ORAL at 10:11

## 2021-01-01 RX ADMIN — Medication 10 ML: at 21:35

## 2021-01-01 RX ADMIN — POTASSIUM CHLORIDE 10 MEQ: 7.46 INJECTION, SOLUTION INTRAVENOUS at 01:00

## 2021-01-01 RX ADMIN — POTASSIUM CHLORIDE 10 MEQ: 7.46 INJECTION, SOLUTION INTRAVENOUS at 11:27

## 2021-01-01 RX ADMIN — NYSTATIN 500000 UNITS: 100000 SUSPENSION ORAL at 21:03

## 2021-01-01 RX ADMIN — MAGNESIUM OXIDE 400 MG: 400 TABLET ORAL at 11:31

## 2021-01-01 RX ADMIN — PANTOPRAZOLE SODIUM 40 MG: 40 TABLET, DELAYED RELEASE ORAL at 09:47

## 2021-01-01 RX ADMIN — METOPROLOL TARTRATE 50 MG: 50 TABLET, FILM COATED ORAL at 21:32

## 2021-01-01 RX ADMIN — Medication 10 ML: at 14:02

## 2021-01-01 RX ADMIN — HEPARIN SODIUM 5000 UNITS: 5000 INJECTION INTRAVENOUS; SUBCUTANEOUS at 10:04

## 2021-01-01 RX ADMIN — Medication 10 ML: at 22:43

## 2021-01-01 RX ADMIN — Medication 2000 UNITS: at 10:04

## 2021-01-01 RX ADMIN — Medication 10 ML: at 06:04

## 2021-01-01 RX ADMIN — OXYBUTYNIN CHLORIDE 5 MG: 5 TABLET ORAL at 09:47

## 2021-01-01 RX ADMIN — ZINC SULFATE 220 MG (50 MG) CAPSULE 1 CAPSULE: CAPSULE at 08:45

## 2021-01-01 RX ADMIN — NYSTATIN 500000 UNITS: 100000 SUSPENSION ORAL at 17:13

## 2021-01-01 RX ADMIN — SODIUM CHLORIDE 40 MG: 9 INJECTION INTRAMUSCULAR; INTRAVENOUS; SUBCUTANEOUS at 09:44

## 2021-01-01 RX ADMIN — AZITHROMYCIN DIHYDRATE 500 MG: 500 INJECTION, POWDER, LYOPHILIZED, FOR SOLUTION INTRAVENOUS at 02:13

## 2021-01-01 RX ADMIN — POTASSIUM CHLORIDE 20 MEQ: 750 TABLET, FILM COATED, EXTENDED RELEASE ORAL at 09:47

## 2021-01-01 RX ADMIN — ATORVASTATIN CALCIUM 20 MG: 20 TABLET, FILM COATED ORAL at 09:02

## 2021-01-01 RX ADMIN — Medication 2000 UNITS: at 09:02

## 2021-01-01 RX ADMIN — POTASSIUM CHLORIDE 40 MEQ: 1500 TABLET, EXTENDED RELEASE ORAL at 22:27

## 2021-01-01 RX ADMIN — SODIUM CHLORIDE 40 MG: 9 INJECTION INTRAMUSCULAR; INTRAVENOUS; SUBCUTANEOUS at 10:11

## 2021-01-01 RX ADMIN — POTASSIUM CHLORIDE 10 MEQ: 10 INJECTION, SOLUTION INTRAVENOUS at 13:30

## 2021-01-01 RX ADMIN — POTASSIUM CHLORIDE 10 MEQ: 7.46 INJECTION, SOLUTION INTRAVENOUS at 02:00

## 2021-01-01 RX ADMIN — POTASSIUM CHLORIDE 10 MEQ: 10 INJECTION, SOLUTION INTRAVENOUS at 15:47

## 2021-01-01 RX ADMIN — OXYBUTYNIN CHLORIDE 5 MG: 5 TABLET ORAL at 17:47

## 2021-01-01 RX ADMIN — ATORVASTATIN CALCIUM 20 MG: 20 TABLET, FILM COATED ORAL at 09:11

## 2021-01-01 RX ADMIN — DEXTROSE MONOHYDRATE 100 ML/HR: 50 INJECTION, SOLUTION INTRAVENOUS at 18:45

## 2021-01-01 RX ADMIN — FAMOTIDINE 20 MG: 20 TABLET ORAL at 10:05

## 2021-01-01 RX ADMIN — NYSTATIN 500000 UNITS: 100000 SUSPENSION ORAL at 08:51

## 2021-01-01 RX ADMIN — Medication 10 ML: at 14:00

## 2021-01-01 RX ADMIN — OXYBUTYNIN CHLORIDE 5 MG: 5 TABLET ORAL at 21:00

## 2021-01-01 RX ADMIN — POTASSIUM CHLORIDE, DEXTROSE MONOHYDRATE AND SODIUM CHLORIDE 75 ML/HR: 150; 5; 450 INJECTION, SOLUTION INTRAVENOUS at 11:27

## 2021-01-01 RX ADMIN — METHYLPREDNISOLONE SODIUM SUCCINATE 40 MG: 40 INJECTION, POWDER, FOR SOLUTION INTRAMUSCULAR; INTRAVENOUS at 10:11

## 2021-01-01 RX ADMIN — METOPROLOL TARTRATE 50 MG: 50 TABLET, FILM COATED ORAL at 20:39

## 2021-01-01 RX ADMIN — Medication 2000 UNITS: at 09:45

## 2021-01-01 RX ADMIN — NYSTATIN 500000 UNITS: 100000 SUSPENSION ORAL at 13:24

## 2021-01-01 RX ADMIN — Medication 10 ML: at 01:45

## 2021-01-01 RX ADMIN — POTASSIUM CHLORIDE 40 MEQ: 1500 TABLET, EXTENDED RELEASE ORAL at 17:20

## 2021-01-01 RX ADMIN — SODIUM CHLORIDE 125 ML/HR: 4.5 INJECTION, SOLUTION INTRAVENOUS at 18:41

## 2021-01-01 RX ADMIN — TAMSULOSIN HYDROCHLORIDE 0.4 MG: 0.4 CAPSULE ORAL at 10:11

## 2021-01-01 RX ADMIN — LORAZEPAM 1 MG: 1 TABLET ORAL at 17:49

## 2021-01-01 RX ADMIN — TAMSULOSIN HYDROCHLORIDE 0.4 MG: 0.4 CAPSULE ORAL at 09:11

## 2021-01-01 RX ADMIN — SODIUM CHLORIDE 125 ML/HR: 4.5 INJECTION, SOLUTION INTRAVENOUS at 10:10

## 2021-01-01 RX ADMIN — ATORVASTATIN CALCIUM 20 MG: 20 TABLET, FILM COATED ORAL at 21:01

## 2021-01-01 RX ADMIN — METHYLPREDNISOLONE SODIUM SUCCINATE 40 MG: 40 INJECTION, POWDER, FOR SOLUTION INTRAMUSCULAR; INTRAVENOUS at 22:17

## 2021-01-01 RX ADMIN — MAGNESIUM OXIDE 400 MG: 400 TABLET ORAL at 08:45

## 2021-01-01 RX ADMIN — POTASSIUM CHLORIDE 40 MEQ: 1500 TABLET, EXTENDED RELEASE ORAL at 15:24

## 2021-01-01 RX ADMIN — Medication 10 ML: at 05:11

## 2021-01-01 RX ADMIN — AMOXICILLIN AND CLAVULANATE POTASSIUM 1 TABLET: 875; 125 TABLET, FILM COATED ORAL at 09:47

## 2021-01-01 RX ADMIN — PANTOPRAZOLE SODIUM 40 MG: 40 TABLET, DELAYED RELEASE ORAL at 08:51

## 2021-01-01 RX ADMIN — METHYLPREDNISOLONE SODIUM SUCCINATE 40 MG: 40 INJECTION, POWDER, FOR SOLUTION INTRAMUSCULAR; INTRAVENOUS at 05:48

## 2021-01-01 RX ADMIN — ZINC SULFATE 220 MG (50 MG) CAPSULE 1 CAPSULE: CAPSULE at 10:10

## 2021-01-01 RX ADMIN — POTASSIUM CHLORIDE 10 MEQ: 7.46 INJECTION, SOLUTION INTRAVENOUS at 00:08

## 2021-01-01 RX ADMIN — MAGNESIUM OXIDE 400 MG: 400 TABLET ORAL at 17:20

## 2021-01-01 RX ADMIN — Medication 10 ML: at 21:54

## 2021-01-01 RX ADMIN — SODIUM CHLORIDE 40 MG: 9 INJECTION INTRAMUSCULAR; INTRAVENOUS; SUBCUTANEOUS at 23:39

## 2021-01-01 RX ADMIN — POTASSIUM CHLORIDE 10 MEQ: 7.46 INJECTION, SOLUTION INTRAVENOUS at 20:39

## 2021-01-01 RX ADMIN — AZITHROMYCIN DIHYDRATE 500 MG: 500 INJECTION, POWDER, LYOPHILIZED, FOR SOLUTION INTRAVENOUS at 23:40

## 2021-01-01 RX ADMIN — LORAZEPAM 1 MG: 1 TABLET ORAL at 09:19

## 2021-01-01 RX ADMIN — PANTOPRAZOLE SODIUM 40 MG: 40 TABLET, DELAYED RELEASE ORAL at 09:12

## 2021-01-01 RX ADMIN — FAMOTIDINE 20 MG: 20 TABLET ORAL at 18:41

## 2021-01-01 RX ADMIN — POTASSIUM CHLORIDE 20 MEQ: 750 TABLET, FILM COATED, EXTENDED RELEASE ORAL at 17:47

## 2021-01-01 RX ADMIN — ZINC SULFATE 220 MG (50 MG) CAPSULE 1 CAPSULE: CAPSULE at 09:44

## 2021-01-01 RX ADMIN — DEXTROSE MONOHYDRATE AND POTASSIUM CHLORIDE: 5; .149 INJECTION, SOLUTION INTRAVENOUS at 11:06

## 2021-01-01 RX ADMIN — METHYLPREDNISOLONE SODIUM SUCCINATE 40 MG: 40 INJECTION, POWDER, FOR SOLUTION INTRAMUSCULAR; INTRAVENOUS at 20:21

## 2021-01-01 RX ADMIN — Medication 10 ML: at 14:41

## 2021-01-01 RX ADMIN — DEXTROSE MONOHYDRATE 100 ML/HR: 50 INJECTION, SOLUTION INTRAVENOUS at 01:19

## 2021-01-01 RX ADMIN — TAMSULOSIN HYDROCHLORIDE 0.4 MG: 0.4 CAPSULE ORAL at 09:44

## 2021-01-01 RX ADMIN — SODIUM CHLORIDE 40 MG: 9 INJECTION INTRAMUSCULAR; INTRAVENOUS; SUBCUTANEOUS at 09:00

## 2021-01-01 RX ADMIN — POTASSIUM CHLORIDE 10 MEQ: 7.46 INJECTION, SOLUTION INTRAVENOUS at 21:59

## 2021-01-01 RX ADMIN — AMLODIPINE BESYLATE 2.5 MG: 2.5 TABLET ORAL at 21:00

## 2021-01-01 RX ADMIN — FAMOTIDINE 20 MG: 20 TABLET ORAL at 17:49

## 2021-01-01 RX ADMIN — SODIUM CHLORIDE, POTASSIUM CHLORIDE, SODIUM LACTATE AND CALCIUM CHLORIDE 150 ML/HR: 600; 310; 30; 20 INJECTION, SOLUTION INTRAVENOUS at 22:28

## 2021-01-01 RX ADMIN — Medication 2000 UNITS: at 08:21

## 2021-01-01 RX ADMIN — METHYLPREDNISOLONE SODIUM SUCCINATE 40 MG: 40 INJECTION, POWDER, FOR SOLUTION INTRAMUSCULAR; INTRAVENOUS at 21:34

## 2021-01-01 RX ADMIN — ENOXAPARIN SODIUM 60 MG: 30 INJECTION SUBCUTANEOUS at 06:33

## 2021-01-01 RX ADMIN — ZINC SULFATE 220 MG (50 MG) CAPSULE 1 CAPSULE: CAPSULE at 09:11

## 2021-01-01 RX ADMIN — TAMSULOSIN HYDROCHLORIDE 0.4 MG: 0.4 CAPSULE ORAL at 09:01

## 2021-01-01 RX ADMIN — MAGNESIUM OXIDE 400 MG: 400 TABLET ORAL at 09:00

## 2021-01-01 RX ADMIN — SODIUM CHLORIDE 125 ML/HR: 4.5 INJECTION, SOLUTION INTRAVENOUS at 03:10

## 2021-01-01 RX ADMIN — ATORVASTATIN CALCIUM 20 MG: 20 TABLET, FILM COATED ORAL at 08:45

## 2021-01-01 RX ADMIN — ATORVASTATIN CALCIUM 20 MG: 20 TABLET, FILM COATED ORAL at 10:11

## 2021-01-01 RX ADMIN — METOPROLOL TARTRATE 50 MG: 50 TABLET, FILM COATED ORAL at 10:11

## 2021-01-01 RX ADMIN — HALOPERIDOL LACTATE 2 MG: 5 INJECTION, SOLUTION INTRAMUSCULAR at 12:29

## 2021-01-01 RX ADMIN — Medication 10 ML: at 06:10

## 2021-01-01 RX ADMIN — POTASSIUM CHLORIDE 10 MEQ: 10 INJECTION, SOLUTION INTRAVENOUS at 12:30

## 2021-01-01 RX ADMIN — Medication 2000 UNITS: at 08:45

## 2021-01-01 RX ADMIN — METOPROLOL TARTRATE 50 MG: 50 TABLET, FILM COATED ORAL at 22:13

## 2021-01-01 RX ADMIN — METHYLPREDNISOLONE SODIUM SUCCINATE 40 MG: 40 INJECTION, POWDER, FOR SOLUTION INTRAMUSCULAR; INTRAVENOUS at 14:46

## 2021-01-01 RX ADMIN — ZINC SULFATE 220 MG (50 MG) CAPSULE 1 CAPSULE: CAPSULE at 08:21

## 2021-01-01 RX ADMIN — POTASSIUM CHLORIDE 40 MEQ: 1500 TABLET, EXTENDED RELEASE ORAL at 09:00

## 2021-01-01 RX ADMIN — TAMSULOSIN HYDROCHLORIDE 0.4 MG: 0.4 CAPSULE ORAL at 08:45

## 2021-01-01 RX ADMIN — METHYLPREDNISOLONE SODIUM SUCCINATE 40 MG: 40 INJECTION, POWDER, FOR SOLUTION INTRAMUSCULAR; INTRAVENOUS at 20:33

## 2021-01-01 RX ADMIN — ATORVASTATIN CALCIUM 20 MG: 20 TABLET, FILM COATED ORAL at 09:44

## 2021-01-01 RX ADMIN — POTASSIUM CHLORIDE 10 MEQ: 7.46 INJECTION, SOLUTION INTRAVENOUS at 09:22

## 2021-01-01 RX ADMIN — DEXTROSE MONOHYDRATE 100 ML/HR: 50 INJECTION, SOLUTION INTRAVENOUS at 08:15

## 2021-01-01 RX ADMIN — METOPROLOL TARTRATE 50 MG: 50 TABLET, FILM COATED ORAL at 09:44

## 2021-01-01 RX ADMIN — Medication 2000 UNITS: at 09:11

## 2021-01-01 RX ADMIN — Medication 10 ML: at 05:46

## 2021-01-01 RX ADMIN — Medication 2000 UNITS: at 10:11

## 2021-01-01 RX ADMIN — Medication 10 ML: at 22:30

## 2021-01-01 RX ADMIN — POTASSIUM CHLORIDE 10 MEQ: 10 INJECTION, SOLUTION INTRAVENOUS at 10:16

## 2021-01-01 RX ADMIN — SODIUM CHLORIDE 125 ML/HR: 4.5 INJECTION, SOLUTION INTRAVENOUS at 09:20

## 2021-01-01 RX ADMIN — ASPIRIN 81 MG: 81 TABLET, CHEWABLE ORAL at 06:33

## 2021-01-01 RX ADMIN — POTASSIUM CHLORIDE 10 MEQ: 10 INJECTION, SOLUTION INTRAVENOUS at 14:26

## 2021-01-01 RX ADMIN — POTASSIUM CHLORIDE 10 MEQ: 7.46 INJECTION, SOLUTION INTRAVENOUS at 22:46

## 2021-01-01 RX ADMIN — AZITHROMYCIN DIHYDRATE 500 MG: 500 INJECTION, POWDER, LYOPHILIZED, FOR SOLUTION INTRAVENOUS at 23:59

## 2021-01-01 RX ADMIN — Medication 10 ML: at 09:24

## 2021-01-01 RX ADMIN — MAGNESIUM OXIDE 400 MG: 400 TABLET ORAL at 17:13

## 2021-01-01 NOTE — PROGRESS NOTES
Progress Note  Date:1/1/2021       Room:[facility]  Patient Erlin Marinor     YOB: 1940     Age:80 y.o. Subjective    Patient is a 73-year-old female with history of hypertension, urinary incontinence and GERD who was brought into the emergency department this afternoon by EMS after the patient was found on the floor at home by her neighbor. Sergei Palma is a poor historian and didn't know for how long she was on the floor and power of  (Siobhansister at [phone]) ABK could not be reached. This morning pt was awake and oriented to person but not to place or time and and very pleasant without any complaints nor does seem like she has some discomfort with deep palpation of her abdomen.  Patient removed her IV yesterday so has been getting IM antibiotics and drinking fluids and eating.  Patient lives at home alone and does not seem to be able to take care of herself given the confusion    This morning patient seen in bed asleep but easily aroused. Oriented to person and time but not to place without any complaints. Review of Systems   Constitutional: Negative for activity change, appetite change, chills, diaphoresis, fatigue and fever. Respiratory: Negative for cough, choking, chest tightness, shortness of breath, wheezing and stridor. Cardiovascular: Negative for chest pain and palpitations. Gastrointestinal: Negative for abdominal distention, abdominal pain, blood in stool, constipation and diarrhea.        Objective           Vitals Last 24 Hours:  Patient Vitals for the past 24 hrs:   Temp Pulse Resp BP SpO2   01/01/21 1157 97 °F (36.1 °C) 67 18 (!) 119/54 97 %   01/01/21 0749 97.2 °F (36.2 °C) 83 18 105/82 97 %   01/01/21 0530 97 °F (36.1 °C) 80 18 (!) 112/52 90 %   01/01/21 0102 97.4 °F (36.3 °C) 94 18 (!) 101/57 96 %   12/31/20 2013 97 °F (36.1 °C) 86 18 112/71 95 %   12/31/20 1655 98 °F (36.7 °C) 87 19 106/60 96 %   12/31/20 1452 98 °F (36.7 °C) 79 18 130/76         I/O (24Hr): No intake or output data in the 24 hours ending 01/01/21 1335    Physical Exam:  General Appearance:  Comfortable, well-appearing. No acute distress. HEENT: NCAT, EOMI, No deformities or discharge, Neck supple with trachea midline. Respiratory: Normal respiratory rate. Breath sounds clear to auscultation. Heart: S1 normal and S2 normal.  No tachycardia  Abdomen: Soft and flat. Bowel sounds are normal. No rebound or guarding. No tenderness to palpation. Extremities: Normal range of motion. No acute deformities. Neurological: Alert and oriented to person, place and time. Skin:  Warm and dry.       Medications           Current Facility-Administered Medications   Medication Dose Route Frequency    potassium chloride SR (KLOR-CON 10) tablet 20 mEq  20 mEq Oral BID    amoxicillin-clavulanate (AUGMENTIN) 875-125 mg per tablet 1 Tab  1 Tab Oral BID WITH MEALS    amLODIPine (NORVASC) tablet 2.5 mg  2.5 mg Oral QHS    oxybutynin (DITROPAN) tablet 5 mg  5 mg Oral TID    pantoprazole (PROTONIX) tablet 40 mg  40 mg Oral ACB    acetaminophen (TYLENOL) tablet 650 mg  650 mg Oral Q6H PRN    ondansetron (ZOFRAN) injection 4 mg  4 mg IntraVENous Q8H PRN    metoprolol succinate (TOPROL-XL) XL tablet 50 mg  50 mg Oral DAILY    atorvastatin (LIPITOR) tablet 20 mg  20 mg Oral QHS    tamsulosin (FLOMAX) capsule 0.4 mg  0.4 mg Oral DAILY         Allergies         Codeine, Codeine, Ibuprofen, Morphine, and Morphine       Labs/Imaging/Diagnostics      Labs:  Recent Results (from the past 24 hour(s))   METABOLIC PANEL, BASIC    Collection Time: 01/01/21  6:49 AM   Result Value Ref Range    Sodium 142 136 - 145 mmol/L    Potassium 4.1 3.5 - 5.1 mmol/L    Chloride 105 97 - 108 mmol/L    CO2 29 21 - 32 mmol/L    Anion gap 8 5 - 15 mmol/L    Glucose 102 (H) 65 - 100 mg/dL    BUN 9 6 - 20 mg/dL    Creatinine 1.00 0.55 - 1.02 mg/dL    BUN/Creatinine ratio 9 (L) 12 - 20      GFR est AA >60 >60 ml/min/1.73m2    GFR est non-AA 53 (L) >60 ml/min/1.73m2    Calcium 9.7 8.5 - 10.1 mg/dL   MAGNESIUM    Collection Time: 01/01/21  6:49 AM   Result Value Ref Range    Magnesium 1.9 1.6 - 2.4 mg/dL   CBC WITH AUTOMATED DIFF    Collection Time: 01/01/21  6:49 AM   Result Value Ref Range    WBC 6.4 4.4 - 11.3 K/uL    RBC 4.91 4.50 - 5.90 M/uL    HGB 14.2 13.5 - 17.5 g/dL    HCT 42.5 36 - 46 %    MCV 86.6 80 - 100 FL    MCH 28.9 (L) 31 - 34 PG    MCHC 33.3 31.0 - 36.0 g/dL    RDW 16.8 (H) 11.5 - 14.5 %    PLATELET 921 K/uL    MPV 9.5 6.5 - 11.5 FL    NRBC 30.0  WBC    ABSOLUTE NRBC 0.02 K/uL    NEUTROPHILS 63 42 - 75 %    LYMPHOCYTES 21 20.5 - 51.1 %    MONOCYTES 11 (H) 1.7 - 9.3 %    EOSINOPHILS 4 (H) 0.9 - 2.9 %    BASOPHILS 1 0.0 - 2.5 %    ABS. NEUTROPHILS 4.1 1.8 - 7.7 K/UL    ABS. LYMPHOCYTES 1.3 1.0 - 4.8 K/UL    ABS. MONOCYTES 0.7 0.2 - 2.4 K/UL    ABS. EOSINOPHILS 0.2 0.0 - 0.7 K/UL    ABS. BASOPHILS 0.0 0.0 - 0.2 K/UL        Trended key labs include:  Recent Labs     01/01/21  0649   WBC 6.4   HGB 14.2   HCT 42.5        Recent Labs     01/01/21  0649      K 4.1      CO2 29   *   BUN 9   CREA 1.00   CA 9.7   MG 1.9       Imaging Last 24 Hours:  No results found. 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     Pt improved and now very much awake though still not oriented  -12/25/2020 patient is eating and drinking as per nursing so we will continue to hold on restarting her IV.  -12/26/2020  BUN/crea continues to improve, continue to monitor  -12/27/2020 renal function stable continue IV fluids  -1/1/2021 resolved patient no longer on IV fluids and tolerating orals    Hypertension  Bps stable, continue amlodipine, HCTZ, metoprolol  -1/1/2021 blood pressure stable continue same    Acute cystitis without hematuria  Currently on rocephin and awaiting urine culture  -12/25/2020 urine culture still pending but will change antibiotics to Augmentin  -12/26/20 sister reports that pt with chronic UTI and is on prophylactic abx that is being managed by   -12/27/2020 continue with Augmentin for UTI    Hypokalemia  -12/25/2020 potassium remains low at 3.4 on potassium chloride 20 mEq daily. Will increase to 20 mEq twice daily repeat level in the morning also include a mag level  -12/26/2020  Potassium normal, continue with supplements at 20 meq BID  -12/27/2020 potassium normal continue potassium chloride 20 equivalents twice daily  -1/1/2021 resolved    Thrombocytopenia ([address]  -12/25/2020 platelets decreased from previous we will hold Lovenox and repeat level in the morning  -12/26/2020 platelets improved from previous, continue to follow  -1/1/2021 resolved    Dementia ([address]  Spoke to pt's sister, Rachael Knapp, who stated that it is only her and the patient in the family and pt has always refused to do a living well or power of . As per sister patient has been told many times before that she is not to live alone and at one point was in the nursing home in [place] until about a year ago when she decided she was going to live with her sister but only did so for short time then moved back to [facility] and has been on her home. Patient is normally followed by Dr. Anika Raymond.   Patient's sister was informed that we will attempt to get patient placed in a nursing home because given her mentation she is not able to live alone as she is not oriented to time or place.   -12/27/2020 patient still oriented only to person and will need placement since she is unable to care for herself given her mentation  -1/1/2021 patient is oriented to person and time today and appears to be very lucid and coherent conversations at times    Abdominal pain  She has been having tenderness to palpation of the abdomen so a x-ray was done of the abdomen which did not reveal any acute findings.  -1/1/2021 resolved been an issue for a few days                Electronically signed by Dalton Tenorio MD, Alyssia Arvizu on 1/1/2021 at 1:25 PM

## 2021-01-01 NOTE — ROUTINE PROCESS
Bedside shift change report given to Marcia Contrersa LPN by Dale Brown RN Report included the following information SBAR.

## 2021-01-01 NOTE — PROGRESS NOTES
0700 - Assumed care of patient. Pt alert and pleasantly confused. No acute s/s of distress. Will continue to monitor. 0930 - A visitor came to see the patient and claimed to be a caregiver. The caregiver did not give her name but requested myself and others to be a witness to the patient signing a $600 check for rent. RN stated that patient is confused and not able to make decisions for herself. Caregiver had a check wrote out to a persons name and for $600. Caregiver wanted patient to \"scribble her name\". RN stated she needed to get a poa to help with the financial concerns of the patient. Caregiver visited for a few minutes and left. Case Management notified. 1600 - Pt up to chair with chair alarm intact. Tolerated well    1815 - Pt back to bed. Bed alarm armed and tolerated well.

## 2021-01-01 NOTE — PROGRESS NOTES
Problem: Falls - Risk of  Goal: *Absence of Falls  Description: Document Jacinta Blood Fall Risk and appropriate interventions in the flowsheet. Outcome: Progressing Towards Goal  Note: Fall Risk Interventions:  Mobility Interventions: Bed/chair exit alarm    Mentation Interventions: Reorient patient    Medication Interventions: Bed/chair exit alarm    Elimination Interventions: Call light in reach    History of Falls Interventions: Bed/chair exit alarm         Problem: Patient Education: Go to Patient Education Activity  Goal: Patient/Family Education  Outcome: Progressing Towards Goal     Problem: Fluid Volume - Risk of, Imbalanced  Goal: *Balanced intake and output  Outcome: Progressing Towards Goal     Problem: Pressure Injury - Risk of  Goal: *Prevention of pressure injury  Description: Document Bishnu Scale and appropriate interventions in the flowsheet. Outcome: Progressing Towards Goal  Note: Pressure Injury Interventions:  Sensory Interventions: Minimize linen layers    Moisture Interventions: Check for incontinence Q2 hours and as needed    Activity Interventions: Increase time out of bed    Mobility Interventions: PT/OT evaluation    Nutrition Interventions: Document food/fluid/supplement intake    Friction and Shear Interventions: Minimize layers                Problem: Patient Education: Go to Patient Education Activity  Goal: Patient/Family Education  Outcome: Progressing Towards Goal     Problem: Patient Education: Go to Patient Education Activity  Goal: Patient/Family Education  Outcome: Progressing Towards Goal     Problem: Risk for Spread of Infection  Goal: Prevent transmission of infectious organism to others  Description: Prevent the transmission of infectious organisms to other patients, staff members, and visitors.   Outcome: Progressing Towards Goal     Problem: Patient Education:  Go to Education Activity  Goal: Patient/Family Education  Outcome: Progressing Towards Goal

## 2021-01-02 PROCEDURE — 74011250637 HC RX REV CODE- 250/637: Performed by: NURSE PRACTITIONER

## 2021-01-02 PROCEDURE — 74011250637 HC RX REV CODE- 250/637: Performed by: INTERNAL MEDICINE

## 2021-01-02 PROCEDURE — 65270000029 HC RM PRIVATE

## 2021-01-02 RX ADMIN — OXYBUTYNIN CHLORIDE 5 MG: 5 TABLET ORAL at 10:05

## 2021-01-02 RX ADMIN — POTASSIUM CHLORIDE 20 MEQ: 750 TABLET, FILM COATED, EXTENDED RELEASE ORAL at 18:34

## 2021-01-02 RX ADMIN — AMOXICILLIN AND CLAVULANATE POTASSIUM 1 TABLET: 875; 125 TABLET, FILM COATED ORAL at 16:33

## 2021-01-02 RX ADMIN — POTASSIUM CHLORIDE 20 MEQ: 750 TABLET, FILM COATED, EXTENDED RELEASE ORAL at 10:06

## 2021-01-02 RX ADMIN — AMOXICILLIN AND CLAVULANATE POTASSIUM 1 TABLET: 875; 125 TABLET, FILM COATED ORAL at 10:06

## 2021-01-02 RX ADMIN — OXYBUTYNIN CHLORIDE 5 MG: 5 TABLET ORAL at 16:33

## 2021-01-02 RX ADMIN — TAMSULOSIN HYDROCHLORIDE 0.4 MG: 0.4 CAPSULE ORAL at 10:06

## 2021-01-02 RX ADMIN — PANTOPRAZOLE SODIUM 40 MG: 40 TABLET, DELAYED RELEASE ORAL at 07:33

## 2021-01-02 NOTE — PROGRESS NOTES
Progress Note  Date:1/2/2021       Room:[facility]  Patient All Bertrand     YOB: 1940     Age:81 y.o. Subjective    Patient is a 56-year-old female with history of hypertension, urinary incontinence and GERD who was brought into the emergency department this afternoon by EMS after the patient was found on the floor at home by her neighbor. Jina Menchaca is a poor historian and didn't know for how long she was on the floor and power of  (Siobhansister at [phone]) CND could not be reached. This morning pt was awake and oriented to person but not to place or time and and very pleasant without any complaints nor does seem like she has some discomfort with deep palpation of her abdomen.  Patient removed her IV yesterday so has been getting IM antibiotics and drinking fluids and eating.  Patient lives at home alone and does not seem to be able to take care of herself given the confusion. She is seen awake and alert oriented to person only this morning still very pleasant      Review of Systems   Constitutional: Negative for activity change, appetite change, chills, diaphoresis, fatigue and fever. Respiratory: Negative for cough, choking, chest tightness, shortness of breath, wheezing and stridor. Cardiovascular: Negative for chest pain and palpitations. Gastrointestinal: Negative for abdominal distention, abdominal pain, blood in stool, constipation and diarrhea. Objective           Vitals Last 24 Hours:  Patient Vitals for the past 24 hrs:   Temp Pulse Resp BP SpO2   01/02/21 1240 (!) 96.7 °F (35.9 °C) (!) 59 18 (!) 125/52 98 %   01/02/21 0749 97.2 °F (36.2 °C) 65 18 (!) 90/52 96 %   01/02/21 0445 97.2 °F (36.2 °C) 70 18 (!) 98/58 98 %   01/02/21 0240 97 °F (36.1 °C) 64 20 103/68    01/01/21 1948 97 °F (36.1 °C) 64 20 103/68 96 %   01/01/21 1625 97.1 °F (36.2 °C) 66 18 (!) 99/51 98 %        I/O (24Hr):   No intake or output data in the 24 hours ending 01/02/21 1500    Physical Exam:  General Appearance:  Comfortable, well-appearing. No acute distress.    HEENT: NCAT, EOMI, No deformities or discharge, Neck supple with trachea midline.  Respiratory: Normal respiratory rate.  Breath sounds clear to auscultation.    Heart: S1 normal and S2 normal.  No tachycardia  Abdomen: Soft and flat.  Bowel sounds are normal. No rebound or guarding. No tenderness to palpation.   Extremities: Normal range of motion.  No acute deformities.  Skin:  Warm and dry.      Medications           Current Facility-Administered Medications   Medication Dose Route Frequency   • potassium chloride SR (KLOR-CON 10) tablet 20 mEq  20 mEq Oral BID   • amoxicillin-clavulanate (AUGMENTIN) 875-125 mg per tablet 1 Tab  1 Tab Oral BID WITH MEALS   • amLODIPine (NORVASC) tablet 2.5 mg  2.5 mg Oral QHS   • oxybutynin (DITROPAN) tablet 5 mg  5 mg Oral TID   • pantoprazole (PROTONIX) tablet 40 mg  40 mg Oral ACB   • acetaminophen (TYLENOL) tablet 650 mg  650 mg Oral Q6H PRN   • ondansetron (ZOFRAN) injection 4 mg  4 mg IntraVENous Q8H PRN   • metoprolol succinate (TOPROL-XL) XL tablet 50 mg  50 mg Oral DAILY   • atorvastatin (LIPITOR) tablet 20 mg  20 mg Oral QHS   • tamsulosin (FLOMAX) capsule 0.4 mg  0.4 mg Oral DAILY         Allergies         Codeine, Codeine, Ibuprofen, Morphine, and Morphine       Labs/Imaging/Diagnostics      Labs:  No results found for this or any previous visit (from the past 24 hour(s)).     Trended key labs include:  Recent Labs     01/01/21  0649   WBC 6.4   HGB 14.2   HCT 42.5        Recent Labs     01/01/21  0649      K 4.1      CO2 29   *   BUN 9   CREA 1.00   CA 9.7   MG 1.9       Imaging Last 24 Hours:  No results found.      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 Pt improved and now very much awake though still not oriented  -12/25/2020 patient is eating and drinking as per nursing so we will continue to hold on restarting her IV.  -12/26/2020  BUN/crea continues to improve, continue to monitor  -12/27/2020 renal function stable continue IV fluids  -1/1/2021 resolved patient no longer on IV fluids and tolerating orals    Hypertension  Bps stable, continue amlodipine, HCTZ, metoprolol  -1/1/2021 blood pressure stable continue same  -1/2/2021 blood pressure stable, continue same    Acute cystitis without hematuria  Currently on rocephin and awaiting urine culture  -12/25/2020 urine culture still pending but will change antibiotics to Augmentin  -12/26/20 sister reports that pt with chronic UTI and is on prophylactic abx that is being managed by   -12/27/2020 continue with Augmentin for UTI  -1/2/2021 patient with chronic UTI on prophylactic antibiotics    Hypokalemia  -12/25/2020 potassium remains low at 3.4 on potassium chloride 20 mEq daily.   Will increase to 20 mEq twice daily repeat level in the morning also include a mag level  -12/26/2020  Potassium normal, continue with supplements at 20 meq BID  -12/27/2020 potassium normal continue potassium chloride 20 equivalents twice daily  -1/1/2021 resolved    Thrombocytopenia ([address]  -12/25/2020 platelets decreased from previous we will hold Lovenox and repeat level in the morning  -12/26/2020 platelets improved from previous, continue to follow  -1/1/2021 resolved    Dementia ([address]  Spoke to pt's sister, Machelle Archibald, who stated that it is only her and the patient in the family and pt has always refused to do a living well or power of . As per sister patient has been told many times before that she is not to live alone and at one point was in the nursing home in [place] until about a year ago when she decided she was going to live with her sister but only did so for short time then moved back to [facility] and has been on her home. Patient is normally followed by Dr. Rubén Ybarra. Patient's sister was informed that we will attempt to get patient placed in a nursing home because given her mentation she is not able to live alone as she is not oriented to time or place.   -12/27/2020 patient still oriented only to person and will need placement since she is unable to care for herself given her mentation  -1/1/2021 patient is oriented to person and time today and appears to be very lucid and coherent conversations at times  -1/2/2021 patient is oriented only to person today and very pleasantly confused    Abdominal pain  She has been having tenderness to palpation of the abdomen so a x-ray was done of the abdomen which did not reveal any acute findings.  -1/1/2021 resolved been an issue for a few days                Electronically signed by Agustín Werner MD, Gib Medico on 1/2/2021 at 2:54 PM

## 2021-01-02 NOTE — ROUTINE PROCESS
Resident lying in bed watching tv. Pleasantly confused. Call light within reach. Reinforced education to call for assistance. Bed/chair exit alarm in place. Monitoring

## 2021-01-02 NOTE — PROGRESS NOTES
Bedside shift change report given to [address] Mar[address] Mcmahon (oncoming nurse) by Serena Basilio (offgoing nurse). Report included the following information SBAR.

## 2021-01-02 NOTE — ROUTINE PROCESS
Bedside shift change report given to [facility] (oncoming nurse) by Merritt Montiel (offgoing nurse). Report included the following information Kardex.

## 2021-01-02 NOTE — PROGRESS NOTES
Problem: Falls - Risk of  Goal: *Absence of Falls  Description: Document Maggy Nguyen Fall Risk and appropriate interventions in the flowsheet. Outcome: Progressing Towards Goal  Note: Fall Risk Interventions:  Mobility Interventions: Bed/chair exit alarm    Mentation Interventions: Reorient patient    Medication Interventions: Bed/chair exit alarm    Elimination Interventions: Call light in reach    History of Falls Interventions: Bed/chair exit alarm, Consult care management for discharge planning, Room close to nurse's station, Door open when patient unattended         Problem: Patient Education: Go to Patient Education Activity  Goal: Patient/Family Education  Outcome: Progressing Towards Goal     Problem: Fluid Volume - Risk of, Imbalanced  Goal: *Balanced intake and output  Outcome: Progressing Towards Goal     Problem: Pressure Injury - Risk of  Goal: *Prevention of pressure injury  Description: Document Bishnu Scale and appropriate interventions in the flowsheet. Outcome: Progressing Towards Goal  Note: Pressure Injury Interventions:  Sensory Interventions: Keep linens dry and wrinkle-free    Moisture Interventions: Check for incontinence Q2 hours and as needed    Activity Interventions: Increase time out of bed    Mobility Interventions: HOB 30 degrees or less    Nutrition Interventions: Offer support with meals,snacks and hydration    Friction and Shear Interventions: Minimize layers                Problem: Patient Education: Go to Patient Education Activity  Goal: Patient/Family Education  Outcome: Progressing Towards Goal     Problem: Patient Education: Go to Patient Education Activity  Goal: Patient/Family Education  Outcome: Progressing Towards Goal     Problem: Risk for Spread of Infection  Goal: Prevent transmission of infectious organism to others  Description: Prevent the transmission of infectious organisms to other patients, staff members, and visitors.   Outcome: Progressing Towards Goal Problem: Patient Education:  Go to Education Activity  Goal: Patient/Family Education  Outcome: Progressing Towards Goal

## 2021-01-02 NOTE — ROUTINE PROCESS
Pt sleeping on left side at this time. Breathing even and non-labored. Bed alarm on. Call light within reach, bed low and locked. Monitoring.

## 2021-01-03 PROCEDURE — 65270000029 HC RM PRIVATE

## 2021-01-03 PROCEDURE — 74011250637 HC RX REV CODE- 250/637: Performed by: INTERNAL MEDICINE

## 2021-01-03 PROCEDURE — 74011250637 HC RX REV CODE- 250/637: Performed by: NURSE PRACTITIONER

## 2021-01-03 RX ORDER — AMOXICILLIN AND CLAVULANATE POTASSIUM 875; 125 MG/1; MG/1
1 TABLET, FILM COATED ORAL DAILY
Status: DISCONTINUED | OUTPATIENT
Start: 2021-01-04 | End: 2021-01-05 | Stop reason: HOSPADM

## 2021-01-03 RX ADMIN — ATORVASTATIN CALCIUM 20 MG: 20 TABLET, FILM COATED ORAL at 00:00

## 2021-01-03 RX ADMIN — OXYBUTYNIN CHLORIDE 5 MG: 5 TABLET ORAL at 15:44

## 2021-01-03 RX ADMIN — AMLODIPINE BESYLATE 2.5 MG: 2.5 TABLET ORAL at 21:53

## 2021-01-03 RX ADMIN — OXYBUTYNIN CHLORIDE 5 MG: 5 TABLET ORAL at 00:00

## 2021-01-03 RX ADMIN — AMOXICILLIN AND CLAVULANATE POTASSIUM 1 TABLET: 875; 125 TABLET, FILM COATED ORAL at 08:31

## 2021-01-03 RX ADMIN — TAMSULOSIN HYDROCHLORIDE 0.4 MG: 0.4 CAPSULE ORAL at 08:31

## 2021-01-03 RX ADMIN — PANTOPRAZOLE SODIUM 40 MG: 40 TABLET, DELAYED RELEASE ORAL at 07:38

## 2021-01-03 RX ADMIN — OXYBUTYNIN CHLORIDE 5 MG: 5 TABLET ORAL at 21:53

## 2021-01-03 RX ADMIN — AMLODIPINE BESYLATE 2.5 MG: 2.5 TABLET ORAL at 00:00

## 2021-01-03 RX ADMIN — METOPROLOL SUCCINATE 50 MG: 50 TABLET, EXTENDED RELEASE ORAL at 08:31

## 2021-01-03 RX ADMIN — POTASSIUM CHLORIDE 20 MEQ: 750 TABLET, FILM COATED, EXTENDED RELEASE ORAL at 16:42

## 2021-01-03 RX ADMIN — POTASSIUM CHLORIDE 20 MEQ: 750 TABLET, FILM COATED, EXTENDED RELEASE ORAL at 08:31

## 2021-01-03 RX ADMIN — OXYBUTYNIN CHLORIDE 5 MG: 5 TABLET ORAL at 08:31

## 2021-01-03 RX ADMIN — ATORVASTATIN CALCIUM 20 MG: 20 TABLET, FILM COATED ORAL at 21:53

## 2021-01-03 NOTE — PROGRESS NOTES
Problem: Falls - Risk of  Goal: *Absence of Falls  Description: Document Priya Lobe Fall Risk and appropriate interventions in the flowsheet. Outcome: Progressing Towards Goal  Note: Fall Risk Interventions:  Mobility Interventions: Bed/chair exit alarm    Mentation Interventions: Reorient patient    Medication Interventions: Bed/chair exit alarm    Elimination Interventions: Call light in reach    History of Falls Interventions: Bed/chair exit alarm, Consult care management for discharge planning, Room close to nurse's station, Door open when patient unattended         Problem: Patient Education: Go to Patient Education Activity  Goal: Patient/Family Education  Outcome: Progressing Towards Goal     Problem: Fluid Volume - Risk of, Imbalanced  Goal: *Balanced intake and output  Outcome: Progressing Towards Goal     Problem: Pressure Injury - Risk of  Goal: *Prevention of pressure injury  Description: Document Bishnu Scale and appropriate interventions in the flowsheet. Outcome: Progressing Towards Goal  Note: Pressure Injury Interventions:  Sensory Interventions: Keep linens dry and wrinkle-free    Moisture Interventions: Check for incontinence Q2 hours and as needed    Activity Interventions: Increase time out of bed    Mobility Interventions: HOB 30 degrees or less    Nutrition Interventions: Offer support with meals,snacks and hydration    Friction and Shear Interventions: Minimize layers                Problem: Patient Education: Go to Patient Education Activity  Goal: Patient/Family Education  Outcome: Progressing Towards Goal     Problem: Patient Education: Go to Patient Education Activity  Goal: Patient/Family Education  Outcome: Progressing Towards Goal     Problem: Risk for Spread of Infection  Goal: Prevent transmission of infectious organism to others  Description: Prevent the transmission of infectious organisms to other patients, staff members, and visitors.   Outcome: Progressing Towards Goal Problem: Patient Education:  Go to Education Activity  Goal: Patient/Family Education  Outcome: Progressing Towards Goal

## 2021-01-03 NOTE — ROUTINE PROCESS
Bedside shift change report given to Sanjay Brown LPN (oncoming nurse) by Francisca Lloyd (offgoing nurse). Report included the following information SBAR and Kardex.

## 2021-01-03 NOTE — PROGRESS NOTES
Progress Note  Date:1/3/2021       Room:[facility]  Patient Royce Garcia     YOB: 1940     Age:81 y.o. Subjective    Patient is a 64-year-old female with history of hypertension, urinary incontinence and GERD who was brought into the emergency department this afternoon by EMS after the patient was found on the floor at home by her neighbor. Anabell Cardona is a poor historian and didn't know for how long she was on the floor and power of  (Siobhansister at [phone]) ARTHUR could not be reached. This morning pt was awake and oriented to person but not to place or time and and very pleasant without any complaints nor does seem like she has some discomfort with deep palpation of her abdomen.  Patient removed her IV yesterday so has been getting IM antibiotics and drinking fluids and eating.  Patient lives at home alone and does not seem to be able to take care of herself given the confusion.     She is seen awake and alert oriented to person only this morning still very pleasant    Review of Systems  Constitutional: Negative for activity change, appetite change, chills, diaphoresis, fatigue and fever. Respiratory: Negative for cough, choking, chest tightness, shortness of breath, wheezing and stridor.    Cardiovascular: Negative for chest pain and palpitations.    Gastrointestinal: Negative for abdominal distention, abdominal pain, blood in stool, constipation and diarrhea  Objective           Vitals Last 24 Hours:  Patient Vitals for the past 24 hrs:   Temp Pulse Resp BP SpO2   01/03/21 1220 97.5 °F (36.4 °C) 63 18 (!) 105/55 97 %   01/03/21 0759 97.5 °F (36.4 °C) (!) 103 18 97/63 95 %   01/03/21 0520 97.5 °F (36.4 °C) 75 18 112/61 97 %   01/03/21 0023 97.5 °F (36.4 °C) 82 18 91/60 98 %   01/02/21 2016 97.8 °F (36.6 °C) 72 18 (!) 112/58 94 %   01/02/21 1615 97.7 °F (36.5 °C) 64 18 (!) 110/58 97 %   01/02/21 1240 (!) 96.7 °F (35.9 °C) (!) 59 18 (!) 125/52 98 %        I/O (24Hr): Intake/Output Summary (Last 24 hours) at 1/3/2021 1225  Last data filed at 1/3/2021 0520  Gross per 24 hour   Intake 200 ml   Output    Net 200 ml       Physical Exam:  General Appearance:  Comfortable, well-appearing. No acute distress. HEENT: NCAT, EOMI, No deformities or discharge, Neck supple with trachea midline. Respiratory: Normal respiratory rate. Breath sounds clear to auscultation. Heart: S1 normal and S2 normal.  No tachycardia  Abdomen: Soft and flat. Bowel sounds are normal. No rebound or guarding. No tenderness to palpation. Extremities: Normal range of motion. No acute deformities. Skin:  Warm and dry. Medications           Current Facility-Administered Medications   Medication Dose Route Frequency    [START ON 1/4/2021] amoxicillin-clavulanate (AUGMENTIN) 875-125 mg per tablet 1 Tab  1 Tab Oral DAILY    potassium chloride SR (KLOR-CON 10) tablet 20 mEq  20 mEq Oral BID    amLODIPine (NORVASC) tablet 2.5 mg  2.5 mg Oral QHS    oxybutynin (DITROPAN) tablet 5 mg  5 mg Oral TID    pantoprazole (PROTONIX) tablet 40 mg  40 mg Oral ACB    acetaminophen (TYLENOL) tablet 650 mg  650 mg Oral Q6H PRN    ondansetron (ZOFRAN) injection 4 mg  4 mg IntraVENous Q8H PRN    metoprolol succinate (TOPROL-XL) XL tablet 50 mg  50 mg Oral DAILY    atorvastatin (LIPITOR) tablet 20 mg  20 mg Oral QHS    tamsulosin (FLOMAX) capsule 0.4 mg  0.4 mg Oral DAILY         Allergies         Codeine, Codeine, Ibuprofen, Morphine, and Morphine       Labs/Imaging/Diagnostics      Labs:  No results found for this or any previous visit (from the past 24 hour(s)). Trended key labs include:  Recent Labs     01/01/21  0649   WBC 6.4   HGB 14.2   HCT 42.5        Recent Labs     01/01/21  0649      K 4.1      CO2 29   *   BUN 9   CREA 1.00   CA 9.7   MG 1.9       Imaging Last 24 Hours:  No results found.       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 Pt improved and now very much awake though still not oriented  -12/25/2020 patient is eating and drinking as per nursing so we will continue to hold on restarting her IV.  -12/26/2020  BUN/crea continues to improve, continue to monitor  -12/27/2020 renal function stable continue IV fluids  -1/1/2021 resolved patient no longer on IV fluids and tolerating orals    Hypertension  Bps stable, continue amlodipine, HCTZ, metoprolol  -1/1/2021 blood pressure stable continue same  -1/2/2021 blood pressure stable, continue same  -1/3/2021 blood pressures well controlled on amlodipine    Acute cystitis without hematuria  Currently on rocephin and awaiting urine culture  -12/25/2020 urine culture still pending but will change antibiotics to Augmentin  -12/26/20 sister reports that pt with chronic UTI and is on prophylactic abx that is being managed by   -12/27/2020 continue with Augmentin for UTI  -1/2/2021 patient with chronic UTI on prophylactic antibiotics  -1/3/2021 continue with prophylactic antibiotics for chronic UTI we will not change the regimen to daily    Hypokalemia  -12/25/2020 potassium remains low at 3.4 on potassium chloride 20 mEq daily.   Will increase to 20 mEq twice daily repeat level in the morning also include a mag level  -12/26/2020  Potassium normal, continue with supplements at 20 meq BID  -12/27/2020 potassium normal continue potassium chloride 20 equivalents twice daily  -1/1/2021 resolved    Thrombocytopenia ([address]  -12/25/2020 platelets decreased from previous we will hold Lovenox and repeat level in the morning  -12/26/2020 platelets improved from previous, continue to follow  -1/1/2021 resolved    Dementia ([address]  Spoke to pt's sister, Clarence Rogers, who stated that it is only her and the patient in the family and pt has always refused to do a living well or power of . As per sister patient has been told many times before that she is not to live alone and at one point was in the nursing home in [place] until about a year ago when she decided she was going to live with her sister but only did so for short time then moved back to [facility] and has been on her home. Patient is normally followed by Dr. [place]. Patient's sister was informed that we will attempt to get patient placed in a nursing home because given her mentation she is not able to live alone as she is not oriented to time or place. -12/27/2020 patient still oriented only to person and will need placement since she is unable to care for herself given her mentation  -1/1/2021 patient is oriented to person and time today and appears to be very lucid and coherent conversations at times  -1/2/2021 patient is oriented only to person today and very pleasantly confused  -1/3/2021 patient is very pleasant but not oriented to time and place.   Awaiting placement as patient is unable to care for herself    Abdominal pain  She has been having tenderness to palpation of the abdomen so a x-ray was done of the abdomen which did not reveal any acute findings.  -1/1/2021 resolved been an issue for a few days                Electronically signed by Cathie Srivastava MD, Jamey Goodrich on 1/3/2021 at 11:52 AM

## 2021-01-04 PROCEDURE — 65270000029 HC RM PRIVATE

## 2021-01-04 PROCEDURE — 74011250637 HC RX REV CODE- 250/637: Performed by: NURSE PRACTITIONER

## 2021-01-04 PROCEDURE — 74011250637 HC RX REV CODE- 250/637: Performed by: INTERNAL MEDICINE

## 2021-01-04 PROCEDURE — 97530 THERAPEUTIC ACTIVITIES: CPT

## 2021-01-04 RX ADMIN — AMOXICILLIN AND CLAVULANATE POTASSIUM 1 TABLET: 875; 125 TABLET, FILM COATED ORAL at 08:36

## 2021-01-04 RX ADMIN — POTASSIUM CHLORIDE 20 MEQ: 750 TABLET, FILM COATED, EXTENDED RELEASE ORAL at 17:01

## 2021-01-04 RX ADMIN — OXYBUTYNIN CHLORIDE 5 MG: 5 TABLET ORAL at 20:32

## 2021-01-04 RX ADMIN — POTASSIUM CHLORIDE 20 MEQ: 750 TABLET, FILM COATED, EXTENDED RELEASE ORAL at 08:36

## 2021-01-04 RX ADMIN — PANTOPRAZOLE SODIUM 40 MG: 40 TABLET, DELAYED RELEASE ORAL at 08:36

## 2021-01-04 RX ADMIN — TAMSULOSIN HYDROCHLORIDE 0.4 MG: 0.4 CAPSULE ORAL at 08:36

## 2021-01-04 RX ADMIN — OXYBUTYNIN CHLORIDE 5 MG: 5 TABLET ORAL at 16:20

## 2021-01-04 RX ADMIN — ATORVASTATIN CALCIUM 20 MG: 20 TABLET, FILM COATED ORAL at 20:32

## 2021-01-04 RX ADMIN — OXYBUTYNIN CHLORIDE 5 MG: 5 TABLET ORAL at 08:36

## 2021-01-04 NOTE — ROUTINE PROCESS
Bedside shift change report given to glo barbour(oncoming nurse) by Nabila Harvey nurse). Report included the following information SBAR.

## 2021-01-04 NOTE — ROUTINE PROCESS
Slept @ long intervals. Confused. Inc of urine. Bed alarm and fall mat. Has not tried to get out of bed during shift

## 2021-01-04 NOTE — PROGRESS NOTES
PHYSICAL THERAPY TREATMENT  Patient: Richie Ho (74 y.o. female)  Date: 1/4/2021  Diagnosis: Dehydration [E86.0]  UTI (urinary tract infection) [N39.0]  Dementia ([facility] [address] [address] [F03.90] <principal problem not specified>       Precautions:  Fall risk, altered mental status  Chart, physical therapy assessment, plan of care and goals were reviewed. ASSESSMENT  Patient continues with skilled PT services and is progressing towards goals. Pt is steadily progressing toward goals. Pt was able to ambulate approximately 650' with supervision with FWW. Current Level of Function Impacting Discharge (mobility/balance): decreased activity tolerance and functional mobility. Other factors to consider for discharge: Altered mental status         PLAN :  Patient continues to benefit from skilled intervention to address the above impairments. Continue treatment per established plan of care. to address goals. Recommendation for discharge: (in order for the patient to meet his/her long term goals)  Physical therapy at least 2 days/week in the home     This discharge recommendation:  Has been made in collaboration with the attending provider and/or case management    IF patient discharges home will need the following DME: walker       SUBJECTIVE:   Patient stated Ruel Handley had a good weekend.     OBJECTIVE DATA SUMMARY:   Critical Behavior:  Neurologic State: Alert, Confused  Orientation Level: Oriented to person  Cognition: Impaired decision making     Functional Mobility Training:  Bed Mobility:  Rolling: Independent  Supine to Sit: Independent  Sit to Supine: Independent  Scooting: Independent     Transfers:  Sit to Stand: Supervision  Stand to Sit: Supervision    Balance:  Sitting: Intact  Ambulation/Gait Training:  Distance (ft): 650 Feet (ft)  Assistive Device: Walker  Ambulation - Level of Assistance: Supervision     Treatment Session:   Pt able to ambulate 650' with FWW with supervision.  Pt performed seated marches and LAONEAL for 2x10. Pain Ratin/10    Activity Tolerance:   Good and tolerates ADLs without rest breaks  Please refer to the flowsheet for vital signs taken during this treatment. After treatment patient left in no apparent distress:   Supine in bed, Call bell within reach, Bed / chair alarm activated, and Side rails x 3    COMMUNICATION/COLLABORATION:   The patients plan of care was discussed with: Physician. Deja Levin, PT, DPT   Time Calculation: 14 mins      Problem: Mobility Impaired (Adult and Pediatric)  Goal: *Acute Goals and Plan of Care (Insert Text)  Description: FUNCTIONAL STATUS PRIOR TO ADMISSION: Patient was independent and active without use of DME.    HOME SUPPORT PRIOR TO ADMISSION: The patient lived alone with no local support. Physical Therapy Goals  Initiated 2020  1. Patient will perform sit to stand with modified independence within 7 day(s). 2.  Patient will ambulate with modified independence for 500 feet with the least restrictive device within 7 day(s).      Outcome: Progressing Towards Goal

## 2021-01-05 VITALS
BODY MASS INDEX: 21.51 KG/M2 | HEIGHT: 64 IN | RESPIRATION RATE: 18 BRPM | HEART RATE: 89 BPM | SYSTOLIC BLOOD PRESSURE: 105 MMHG | DIASTOLIC BLOOD PRESSURE: 66 MMHG | TEMPERATURE: 97.5 F | WEIGHT: 126 LBS | OXYGEN SATURATION: 97 %

## 2021-01-05 LAB
COVID-19 RAPID TEST, COVR: NOT DETECTED
SARS-COV-2, COV2: NORMAL
SPECIMEN SOURCE: NORMAL

## 2021-01-05 PROCEDURE — 87635 SARS-COV-2 COVID-19 AMP PRB: CPT

## 2021-01-05 PROCEDURE — 74011250637 HC RX REV CODE- 250/637: Performed by: NURSE PRACTITIONER

## 2021-01-05 PROCEDURE — 74011250637 HC RX REV CODE- 250/637: Performed by: INTERNAL MEDICINE

## 2021-01-05 RX ORDER — ACETAMINOPHEN 325 MG/1
650 TABLET ORAL
Qty: 30 TAB | Refills: 0 | Status: SHIPPED | OUTPATIENT
Start: 2021-01-05

## 2021-01-05 RX ORDER — PANTOPRAZOLE SODIUM 40 MG/1
40 TABLET, DELAYED RELEASE ORAL DAILY
Qty: 30 TAB | Refills: 0 | Status: SHIPPED | OUTPATIENT
Start: 2021-01-05

## 2021-01-05 RX ORDER — POTASSIUM CHLORIDE 1500 MG/1
20 TABLET, FILM COATED, EXTENDED RELEASE ORAL DAILY
Qty: 30 TAB | Refills: 0 | Status: SHIPPED | OUTPATIENT
Start: 2021-01-05

## 2021-01-05 RX ORDER — METOPROLOL SUCCINATE 50 MG/1
50 TABLET, EXTENDED RELEASE ORAL DAILY
Qty: 30 TAB | Refills: 0 | Status: SHIPPED | OUTPATIENT
Start: 2021-01-05

## 2021-01-05 RX ORDER — TAMSULOSIN HYDROCHLORIDE 0.4 MG/1
0.4 CAPSULE ORAL DAILY
Qty: 30 CAP | Refills: 0 | Status: SHIPPED | OUTPATIENT
Start: 2021-01-05

## 2021-01-05 RX ORDER — ATORVASTATIN CALCIUM 20 MG/1
20 TABLET, FILM COATED ORAL
Qty: 30 TAB | Refills: 0 | Status: SHIPPED | OUTPATIENT
Start: 2021-01-05

## 2021-01-05 RX ADMIN — OXYBUTYNIN CHLORIDE 5 MG: 5 TABLET ORAL at 08:41

## 2021-01-05 RX ADMIN — PANTOPRAZOLE SODIUM 40 MG: 40 TABLET, DELAYED RELEASE ORAL at 08:41

## 2021-01-05 RX ADMIN — POTASSIUM CHLORIDE 20 MEQ: 750 TABLET, FILM COATED, EXTENDED RELEASE ORAL at 08:41

## 2021-01-05 RX ADMIN — AMOXICILLIN AND CLAVULANATE POTASSIUM 1 TABLET: 875; 125 TABLET, FILM COATED ORAL at 08:41

## 2021-01-05 RX ADMIN — TAMSULOSIN HYDROCHLORIDE 0.4 MG: 0.4 CAPSULE ORAL at 08:41

## 2021-01-05 NOTE — DISCHARGE SUMMARY
Physician Discharge Summary       Patient: Katlin Niño MRN: [id number]     YOB: 1940  Age: [de-identified] y.o. Sex: female    PCP: Ester Moss MD    Allergies: Codeine, Codeine, Ibuprofen, Morphine, and Morphine    Admit date: 12/23/2020  Admitting Provider: Javi Hunter MD    Discharge date: 1/5/2021  Discharging Provider: Javi Hunter MD    * Admission Diagnoses:   Dehydration [E86.0]  UTI (urinary tract infection) [N39.0]  Dementia ([facility] [F03.90]      * Discharge Diagnoses:    Hospital Problems as of 1/5/2021 Date Reviewed: 6/13/2019          Codes Class Noted - Resolved POA    UTI (urinary tract infection) ICD-10-CM: N39.0  ICD-9-CM: 599.0  12/28/2020 - Present Unknown        Dementia ([facility] ICD-10-CM: F03.90  ICD-9-CM: 294.20  12/26/2020 - Present Unknown        Hypokalemia ICD-10-CM: E87.6  ICD-9-CM: 276.8  12/25/2020 - Present Unknown        Thrombocytopenia ([facility] ICD-10-CM: D69.6  ICD-9-CM: 287.5  12/25/2020 - Present Unknown        Acute cystitis without hematuria ICD-10-CM: N30.00  ICD-9-CM: 595.0  12/24/2020 - Present Unknown        Dehydration ICD-10-CM: E86.0  ICD-9-CM: 276.51  12/23/2020 - Present Unknown        Abdominal pain ICD-10-CM: R10.9  ICD-9-CM: 789.00  1/22/2019 - Present Unknown                * Hospital Course:   Patient is a 67-year-old female with history of hypertension, urinary incontinence and GERD who was brought into the emergency department this afternoon by EMS after the patient was found on the floor at home by her neighbor. Steffanie Homans is a poor historian and didn't know for how long she was on the floor and power of  (Siobhansister at [phone]) STX could not be reached.   This morning pt was awake and oriented to person but not to place or time and and very pleasant without any complaints nor does seem like she has some discomfort with deep palpation of her abdomen.  Patient removed her IV yesterday so has been getting IM antibiotics and drinking fluids and eating.  Patient lives at home alone and does not seem to be able to take care of herself given the confusion    Dehydration  Likely secondary to poor oral intake  Given normal saline for gentle rehydration. Pt improved and now very much awake though still not oriented  -12/25/2020 patient is eating and drinking as per nursing so we will continue to hold on restarting her IV.  -12/26/2020  BUN/crea continues to improve, continue to monitor  -12/27/2020 renal function stable continue IV fluids  -12/29: DC IV fluids  - patient will be dc from her home hctz dosing. Monitor po intake. Labs stable.      Hypertension  - blood pressure has been low at times. Continue metoprolol xl 50mg oral daily and will dc hctz which has been on hold during acute care and norvasc 2.5mg which was restarted but with periodic low blood pressure will dc on discharge.      Acute cystitis without hematuria  Currently on rocephin and awaiting urine culture  -12/25/2020 urine culture still pending but will change antibiotics to Augmentin  -12/26/20 sister reports that pt with chronic UTI and is on prophylactic abx that is being managed by   -12/27/2020 continue with Augmentin for UTI  -12/28 tolerating well he is afebrile  - patient completed dosing for treatment of UTI and patient is afebrille.      Hypokalemia  -12/25/2020 potassium remains low at 3.4 on potassium chloride 20 mEq daily.   Will increase to 20 mEq twice daily repeat level in the morning also include a mag level  -12/26/2020  Potassium normal, continue with supplements at 20 meq BID  -12/27/2020 potassium normal continue potassium chloride 20 equivalents twice daily  -12/28 stable sinew current supplementation  - patient K+ level is at 4.1 and was supplemented with 20meq bid and on discharge will change to daily dosing.      Thrombocytopenia ([facility] [address]  -12/25/2020 platelets decreased from previous we will hold Lovenox and repeat level in the morning  -12/26/2020 platelets improved from previous, continue to follow  -12/28 patient's platelet levels back in normal range     Dementia ([address]  Spoke to pt's sister, Hortencia Fox, who stated that it is only her and the patient in the family and pt has always refused to do a living well or power of . As per sister patient has been told many times before that she is not to live alone and at one point was in the nursing home in [place] until about a year ago when she decided she was going to live with her sister but only did so for short time then moved back to [facility] and has been on her home. Patient is normally followed by Dr. Cedric Warren. Patient's sister was informed that we will attempt to get patient placed in a nursing home because given her mentation she is not able to live alone as she is not oriented to time or place. -12/27/2020 patient still oriented only to person and will need placement since she is unable to care for herself given her mentation  -12/28 extensive discussion by case management with patient's family and it was noted that patient's dementia prevents her from living on her own previously was living at the nursing home at that time was noted to have acute kidney sepsis PEG. Patient left nursing home and initially lived with her sister but at this time is unable to take care of her as it is noted that patient has worsening dementia  -12/31: Overnight patient had fall continue to monitor closely possible use of Seroquel at bedtime to help patient rest  - patient stable and possible discharge to LTC in AM tomorrow   - patient has not shown any aggressive behavior. Pleasantly demented.  Recommend psych eval on arrival to evaluate for appropriate meds if needed.        * Procedures:   * No surgery found *        Consults:   NONE    Vitals Last 24 Hours:  Patient Vitals for the past 24 hrs:   Temp Pulse Resp BP SpO2   01/05/21 0717 97.5 °F (36.4 °C) 82 19 (!) 97/52 95 %   01/05/21 0421 97.3 °F (36.3 °C) 89 18 114/67 96 %   01/05/21 0124 97.2 °F (36.2 °C) 85 18 121/65 97 %   01/04/21 2000 97.8 °F (36.6 °C) 86 20 (!) 94/59 99 %   01/04/21 1548 97.5 °F (36.4 °C) 83 20 (!) 99/51    01/04/21 1144 97.9 °F (36.6 °C) 83 18 (!) 94/57 98 %        Discharge Exam:  General: Alert, cooperative, no distress, appears stated age. Head:  Normocephalic, without obvious abnormality, atraumatic. Eyes:  Conjunctivae/corneas clear. Pupils equal, round, reactive to light. Extraocular movements intact. Lungs:  Clear to auscultation bilaterally. no wheeze, rales, crackles, rhonchi   Chest wall: No tenderness or deformity. Heart:  Regular rate and rhythm, S1, S2 normal, no murmur, click, rub or gallop. Abdomen:  Soft, non-tender. Bowel sounds normal. No masses,  No organomegaly. Extremities: Extremities normal, atraumatic, no cyanosis or edema. Pulses: 2+ and symmetric all extremities. Skin: Skin color, texture, turgor normal. No rashes or lesions  Neurologic: Awake, Alert, oriented. No obvious gross sensory or motor deficits    Labs:  Recent Results (from the past 24 hour(s))   SARS-COV-2    Collection Time: 01/05/21  1:15 AM   Result Value Ref Range    SARS-CoV-2 Please find results under separate order     COVID-19 RAPID TEST    Collection Time: 01/05/21  1:15 AM   Result Value Ref Range    Specimen source Nasopharyngeal      COVID-19 rapid test Not Detected Not Detected           Imaging:  Ct Head Wo Cont    Result Date: 12/30/2020  Impression: Age-appropriate atrophy. No acute findings. * Discharge Condition: improved  * Disposition: SNF/LTC [facility]       Discharge Medications:  Current Discharge Medication List      START taking these medications    Details   acetaminophen (TYLENOL) 325 mg tablet Take 2 Tabs by mouth every six (6) hours as needed for Pain or Fever. Qty: 30 Tab, Refills: 0      atorvastatin (LIPITOR) 20 mg tablet Take 1 Tab by mouth nightly.   Qty: 30 Tab, Refills: 0 potassium chloride SR (K-TAB) 20 mEq tablet Take 1 Tab by mouth daily. Qty: 30 Tab, Refills: 0         CONTINUE these medications which have CHANGED    Details   mirabegron ER (Myrbetriq) 50 mg ER tablet Take 1 Tab by mouth daily. Qty: 30 Tab, Refills: 0      pantoprazole (PROTONIX) 40 mg tablet Take 1 Tab by mouth daily. Qty: 30 Tab, Refills: 0      tamsulosin (Flomax) 0.4 mg capsule Take 1 Cap by mouth daily. Qty: 30 Cap, Refills: 0      metoprolol succinate (TOPROL-XL) 50 mg XL tablet Take 1 Tab by mouth daily. Qty: 30 Tab, Refills: 0         STOP taking these medications       simvastatin (ZOCOR) 20 mg tablet Comments:   Reason for Stopping:         fluconazole (DIFLUCAN) 200 mg tablet Comments:   Reason for Stopping:         metoprolol tartrate (LOPRESSOR) 25 mg tablet Comments:   Reason for Stopping:         amoxicillin-clavulanate (AUGMENTIN) 500-125 mg per tablet Comments:   Reason for Stopping:         traMADol (ULTRAM) 50 mg tablet Comments:   Reason for Stopping:         aspirin delayed-release 81 mg tablet Comments:   Reason for Stopping:         hydroCHLOROthiazide (MICROZIDE) 12.5 mg capsule Comments:   Reason for Stopping:         amLODIPine (NORVASC) 2.5 mg tablet Comments:   Reason for Stopping:         diphenhydrAMINE (BENADRYL) 25 mg capsule Comments:   Reason for Stopping:         metroNIDAZOLE (FLAGYL) 250 mg tablet Comments:   Reason for Stopping:                 * Follow-up Care/Patient Instructions: Activity: Activity as tolerated and fall precautions. Diet: Cardiac Diet with aspiration precautions  - With patient dementia will need continued monitoring for pocketing of food and any signs of possible aspiration risk. Continue ensure pudding supplments with lunch and dinner     Patient Rapid COVID test was done in last 24 hours and found to be negative.    Follow-up Information     Follow up With Specialties Details Why Contact Info    [facility], [address] Yolanda Carlos MD Family Medicine In 3 days  [address] [place] [address] [phone]          Spent 35 minutes evaluting and coordinating patient care of which >50% was spent coordinating and counseling.      Signed:  Violeta Walls MD  1/5/2021  8:17 AM

## 2021-01-05 NOTE — PROGRESS NOTES
Report called to [facility]. Updated on current assessment and new medications. [facility] to take patient around noon.

## 2021-01-05 NOTE — PROGRESS NOTES
Progress Note  Date:1/4/2021       Room:[facility] [place]  Patient Asya Singh     YOB: 1940     Age:81 y.o. Subjective    Patient is a 81-year-old female with history of hypertension, urinary incontinence and GERD who was brought into the emergency department this afternoon by EMS after the patient was found on the floor at home by her neighbor. Marlon Keenan is a poor historian and didn't know for how long she was on the floor and power of  (Siobhansister at [phone]) PWQ could not be reached. This morning pt was awake and oriented to person but not to place or time and and very pleasant without any complaints nor does seem like she has some discomfort with deep palpation of her abdomen.  Patient removed her IV yesterday so has been getting IM antibiotics and drinking fluids and eating.  Patient lives at home alone and does not seem to be able to take care of herself given the confusion     Patient seen and evaluated awake alert but not oriented patient overall doing well patient was evaluated by PT and walked 500 feet. It was noted that patient has been accepted in long-term care facility [facility] and possible dc in AM after rapid COVID swab obtained. Review of Systems   Constitutional: Negative for activity change, appetite change, chills and fatigue. Cardiovascular: Negative for leg swelling. Gastrointestinal: Negative for abdominal distention, blood in stool, diarrhea, nausea and vomiting. Psychiatric/Behavioral: Positive for confusion. Negative for agitation and behavioral problems.        Objective           Vitals Last 24 Hours:  Patient Vitals for the past 24 hrs:   Temp Pulse Resp BP SpO2   01/04/21 1548 97.5 °F (36.4 °C) 83 20 (!) 99/51    01/04/21 1144 97.9 °F (36.6 °C) 83 18 (!) 94/57 98 %   01/04/21 0752 97.4 °F (36.3 °C) 74 20 (!) 95/59 96 %   01/04/21 0400 97.4 °F (36.3 °C) 69 20 (!) 111/48 94 %   01/04/21 0000 98 °F (36.7 °C) 62 20 (!) 102/52 97 %        I/O (24Hr): Intake/Output Summary (Last 24 hours) at 1/4/2021 2148  Last data filed at 1/4/2021 1327  Gross per 24 hour   Intake 240 ml   Output 0 ml   Net 240 ml       Physical Exam:  General Appearance:  Comfortable, well-appearing. No acute distress. HEENT: NCAT, EOMI, No deformities or discharge, Neck supple with trachea midline. Respiratory: Normal respiratory rate. Breath sounds clear to auscultation. Heart: S1 normal and S2 normal.  No tachycardia  Abdomen: Soft and flat. Bowel sounds are normal. No rebound or guarding. tenderness to palpation mid to lower abdomen. Extremities: Normal range of motion. No acute deformities. Neurological: Alert and oriented to person   Skin:  Warm and dry. Medications           Current Facility-Administered Medications   Medication Dose Route Frequency    amoxicillin-clavulanate (AUGMENTIN) 875-125 mg per tablet 1 Tab  1 Tab Oral DAILY    potassium chloride SR (KLOR-CON 10) tablet 20 mEq  20 mEq Oral BID    amLODIPine (NORVASC) tablet 2.5 mg  2.5 mg Oral QHS    oxybutynin (DITROPAN) tablet 5 mg  5 mg Oral TID    pantoprazole (PROTONIX) tablet 40 mg  40 mg Oral ACB    acetaminophen (TYLENOL) tablet 650 mg  650 mg Oral Q6H PRN    ondansetron (ZOFRAN) injection 4 mg  4 mg IntraVENous Q8H PRN    metoprolol succinate (TOPROL-XL) XL tablet 50 mg  50 mg Oral DAILY    atorvastatin (LIPITOR) tablet 20 mg  20 mg Oral QHS    tamsulosin (FLOMAX) capsule 0.4 mg  0.4 mg Oral DAILY         Allergies         Codeine, Codeine, Ibuprofen, Morphine, and Morphine       Labs/Imaging/Diagnostics      Labs:  No results found for this or any previous visit (from the past 24 hour(s)). Trended key labs include:  No results for input(s): WBC, HGB, HCT, PLT, HGBEXT, HCTEXT, PLTEXT, HGBEXT, HCTEXT, PLTEXT in the last 72 hours. No results for input(s): NA, K, CL, CO2, GLU, BUN, CREA, CA, MG, PHOS, ALB, TBIL, TBILI, ALT, INR, INREXT, INREXT in the last 72 hours.     No lab exists for component: SGOT    Imaging Last 24 Hours:  No results found. 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 Pt improved and now very much awake though still not oriented  -12/25/2020 patient is eating and drinking as per nursing so we will continue to hold on restarting her IV.  -12/26/2020  BUN/crea continues to improve, continue to monitor  -12/27/2020 renal function stable continue IV fluids  -12/29: DC IV fluids    Hypertension  Bps stable, continue amlodipine, HCTZ, metoprolol    Acute cystitis without hematuria  Currently on rocephin and awaiting urine culture  -12/25/2020 urine culture still pending but will change antibiotics to Augmentin  -12/26/20 sister reports that pt with chronic UTI and is on prophylactic abx that is being managed by   -12/27/2020 continue with Augmentin for UTI  -12/28 tolerating well he is afebrile    Hypokalemia  -12/25/2020 potassium remains low at 3.4 on potassium chloride 20 mEq daily.   Will increase to 20 mEq twice daily repeat level in the morning also include a mag level  -12/26/2020  Potassium normal, continue with supplements at 20 meq BID  -12/27/2020 potassium normal continue potassium chloride 20 equivalents twice daily  -12/28 stable sinew current supplementation    Thrombocytopenia (HCC)  -12/25/2020 platelets decreased from previous we will hold Lovenox and repeat level in the morning  -12/26/2020 platelets improved from previous, continue to follow  -12/28 patient's platelet levels back in normal range    Dementia ([address]  Spoke to pt's sister, Hortencia Fox, who stated that it is only her and the patient in the family and pt has always refused to do a living well or power of . As per sister patient has been told many times before that she is not to live alone and at one point was in the nursing home in [place] until about a year ago when she decided she was going to live with her sister but only did so for short time then moved back to [facility] and has been on her home. Patient is normally followed by Dr. Cedric Warren. Patient's sister was informed that we will attempt to get patient placed in a nursing home because given her mentation she is not able to live alone as she is not oriented to time or place. -12/27/2020 patient still oriented only to person and will need placement since she is unable to care for herself given her mentation  -12/28 extensive discussion by case management with patient's family and it was noted that patient's dementia prevents her from living on her own previously was living at the nursing home at that time was noted to have acute kidney sepsis PEG. Patient left nursing home and initially lived with her sister but at this time is unable to take care of her as it is noted that patient has worsening dementia  -12/31: Overnight patient had fall continue to monitor closely possible use of Seroquel at bedtime to help patient rest  - patient stable and possible discharge to LTC in AM tomorrow     Abdominal pain  She has been having tenderness to palpation of the abdomen so a x-ray was done of the abdomen which did not reveal any acute findings. Patient will continue to be monitored while awaiting for appropriate safe discharge plan    Spent 20 minutes evaluting and coordinating patient care of which >50% was spent coordinating and counseling.

## 2021-01-05 NOTE — PROGRESS NOTES
Reached out to DSS regarding patient's discharge plan; awaiting call back from [address] worker.

## 2021-01-05 NOTE — PROGRESS NOTES
Spoke with Ms. Evans with DSS regarding previously reported APS referral, on 12/28, in regards to safety concerns of patient living alone. Informed Ms. Evans that patient has been accepted at [facility] and the plan is to discharge her today to [facility]. I also notified Ms. Evans that I attempted to reach out to the patient's sister to discuss the discharge plan with her but was unable to reach her and left a voicemail requesting a call back.

## 2021-01-05 NOTE — ROUTINE PROCESS
Bedside shift change report given to Eugene Ruiz LPN (oncoming nurse) by Mitesh Eduardo LPN (offgoing nurse). Report included the following information SBAR.

## 2021-01-05 NOTE — PROGRESS NOTES
Problem: Falls - Risk of  Goal: *Absence of Falls  Description: Document Emma Weston Fall Risk and appropriate interventions in the flowsheet. Outcome: Progressing Towards Goal  Note: Fall Risk Interventions:  Mobility Interventions: Bed/chair exit alarm    Mentation Interventions: Reorient patient    Medication Interventions: Bed/chair exit alarm    Elimination Interventions: Call light in reach, Bed/chair exit alarm    History of Falls Interventions: Bed/chair exit alarm, Door open when patient unattended, Room close to nurse's station         Problem: Patient Education: Go to Patient Education Activity  Goal: Patient/Family Education  Outcome: Progressing Towards Goal     Problem: Fluid Volume - Risk of, Imbalanced  Goal: *Balanced intake and output  Outcome: Progressing Towards Goal     Problem: Pressure Injury - Risk of  Goal: *Prevention of pressure injury  Description: Document Bishnu Scale and appropriate interventions in the flowsheet. Outcome: Progressing Towards Goal  Note: Pressure Injury Interventions:  Sensory Interventions: Keep linens dry and wrinkle-free    Moisture Interventions: Absorbent underpads, Check for incontinence Q2 hours and as needed, Moisture barrier    Activity Interventions: Increase time out of bed    Mobility Interventions: HOB 30 degrees or less    Nutrition Interventions: Offer support with meals,snacks and hydration    Friction and Shear Interventions: Minimize layers                Problem: Patient Education: Go to Patient Education Activity  Goal: Patient/Family Education  Outcome: Progressing Towards Goal     Problem: Patient Education: Go to Patient Education Activity  Goal: Patient/Family Education  Outcome: Progressing Towards Goal     Problem: Risk for Spread of Infection  Goal: Prevent transmission of infectious organism to others  Description: Prevent the transmission of infectious organisms to other patients, staff members, and visitors.   Outcome: Progressing Towards Goal     Problem: Patient Education:  Go to Education Activity  Goal: Patient/Family Education  Outcome: Progressing Towards Goal

## 2021-01-22 NOTE — ED NOTES
Pt ambulatory to wheelchair for [facility] transport back to [facility]. Pt in NAD and 97% on room air

## 2021-01-22 NOTE — ED TRIAGE NOTES
. 
Chief Complaint Patient presents with  Shortness of Breath  
  pt presents via [facility] ambulance service with complaint of SOB, per facility staff pt sat 70% on 2L. Pt A&Ox4 on arrival NAD noted. Pt on room air at this time, for EMS pt 99% on room air.

## 2021-01-22 NOTE — DISCHARGE INSTRUCTIONS
Thank you! Thank you for allowing me to care for you in the emergency department. I sincerely hope that you are satisfied with your visit today. It is my goal to provide you with excellent care. Below you will find a list of your labs and imaging from your visit today. Should you have any questions regarding these results please do not hesitate to call the emergency department. Labs -     Recent Results (from the past 12 hour(s))   CBC WITH AUTOMATED DIFF    Collection Time: 01/22/21  2:00 PM   Result Value Ref Range    WBC 6.0 4.4 - 11.3 K/uL    RBC 6.22 (H) 4.50 - 5.90 M/uL    HGB 18.0 (H) 13.5 - 17.5 g/dL    HCT 54.2 (H) 36 - 46 %    MCV 87.3 80 - 100 FL    MCH 29.0 (L) 31 - 34 PG    MCHC 33.2 31.0 - 36.0 g/dL    RDW 18.1 (H) 11.5 - 14.5 %    PLATELET 519 K/uL    MPV 9.3 6.5 - 11.5 FL    NRBC 20.0  WBC    ABSOLUTE NRBC 0.01 K/uL    NEUTROPHILS 80 (H) 42 - 75 %    LYMPHOCYTES 13 (L) 20.5 - 51.1 %    MONOCYTES 7 1.7 - 9.3 %    EOSINOPHILS 0 (L) 0.9 - 2.9 %    BASOPHILS 0 0.0 - 2.5 %    ABS. NEUTROPHILS 4.8 1.8 - 7.7 K/UL    ABS. LYMPHOCYTES 0.8 (L) 1.0 - 4.8 K/UL    ABS. MONOCYTES 0.4 0.2 - 2.4 K/UL    ABS. EOSINOPHILS 0.0 0.0 - 0.7 K/UL    ABS. BASOPHILS 0.0 0.0 - 0.2 K/UL   METABOLIC PANEL, COMPREHENSIVE    Collection Time: 01/22/21  2:00 PM   Result Value Ref Range    Sodium 140 136 - 145 mmol/L    Potassium 3.7 3.5 - 5.1 mmol/L    Chloride 101 97 - 108 mmol/L    CO2 28 21 - 32 mmol/L    Anion gap 11 5 - 15 mmol/L    Glucose 97 65 - 100 mg/dL    BUN 18 6 - 20 mg/dL    Creatinine 1.13 (H) 0.55 - 1.02 mg/dL    BUN/Creatinine ratio 16 12 - 20      GFR est AA 56 (L) >60 ml/min/1.73m2    GFR est non-AA 46 (L) >60 ml/min/1.73m2    Calcium 10.4 (H) 8.5 - 10.1 mg/dL    Bilirubin, total 1.3 (H) 0.2 - 1.0 mg/dL    AST (SGOT) 64 (H) 15 - 37 U/L    ALT (SGPT) 32 12 - 78 U/L    Alk.  phosphatase 121 (H) 45 - 117 U/L    Protein, total 8.9 (H) 6.4 - 8.2 g/dL    Albumin 3.9 3.5 - 5.0 g/dL    Globulin 5.0 (H) 2.0 - 4.0 g/dL    A-G Ratio 0.8 (L) 1.1 - 2.2         Radiologic Studies -   XR CHEST PORT   Final Result   Impression: The cardiomediastinal silhouette is appropriate for age, technique,   and lung expansion. Pulmonary vasculature is not congested. The lungs are   essentially clear. No effusion or pneumothorax is seen. CT Results  (Last 48 hours)      None          CXR Results  (Last 48 hours)                 01/22/21 1346  XR CHEST PORT Final result    Impression:  Impression: The cardiomediastinal silhouette is appropriate for age, technique,   and lung expansion. Pulmonary vasculature is not congested. The lungs are   essentially clear. No effusion or pneumothorax is seen. Narrative:  1 new comparison 11/7/2012                      If you feel that you have not received excellent quality care or timely care, please ask to speak to the nurse manager. Please choose us in the future for your continued health care needs. ------------------------------------------------------------------------------------------------------------  The exam and treatment you received in the Emergency Department were for an urgent problem and are not intended as complete care. It is important that you follow-up with a doctor, nurse practitioner, or physician assistant to:  (1) confirm your diagnosis,  (2) re-evaluation of changes in your illness and treatment, and  (3) for ongoing care. If your symptoms become worse or you do not improve as expected and you are unable to reach your usual health care provider, you should return to the Emergency Department. We are available 24 hours a day. Please take your discharge instructions with you when you go to your follow-up appointment. If you have any problem arranging a follow-up appointment, contact the Emergency Department immediately. If a prescription has been provided, please have it filled as soon as possible to prevent a delay in treatment.  Read the entire medication instruction sheet provided to you by the pharmacy. If you have any questions or reservations about taking the medication due to side effects or interactions with other medications, please call your primary care physician or contact the ER to speak with the charge nurse. Make an appointment with your family doctor or the physician you were referred to for follow-up of this visit as instructed on your discharge paperwork, as this is a mandatory follow-up. Return to the ER if you are unable to be seen or if you are unable to be seen in a timely manner. If you have any problem arranging the follow-up visit, contact the Emergency Department immediately.

## 2021-01-22 NOTE — ED PROVIDER NOTES
EMERGENCY DEPARTMENT HISTORY AND PHYSICAL EXAM 
 
 
Date: 1/22/2021 Patient Name: Sincere Nails History of Presenting Illness Chief Complaint Patient presents with  Shortness of Breath  
  pt presents via [facility] with complaint of SOB, per facility staff pt sat 70% on 2L. Pt A&Ox4 on arrival NAD noted. Pt on room air at this time, for EMS pt 99% on room air. History Provided By: EMS 
 
HPI: Sincere Nails, [de-identified] y.o. female with a past medical history significant hypertension presents to the ED with cc of Non-productive Cough and Shortness of Breath x 2-3 hours. Looks and feels fine upon arrival. Not in any distress. Denies any chest pain, no dyspnea on exertion, no vomiting, no bleeding, no diarrhea, no fevers There are no other complaints, changes, or physical findings at this time. PCP: Farhana Smith MD 
 
No current facility-administered medications on file prior to encounter. No current outpatient medications on file prior to encounter. Past History Past Medical History: No past medical history on file. Past Surgical History: No past surgical history on file. Family History: No family history on file. Social History: 
Social History Tobacco Use  Smoking status: Not on file Substance Use Topics  Alcohol use: Not on file  Drug use: Not on file Allergies: Allergies Allergen Reactions  Codeine Rash  Ibuprofen Unknown (comments)  Morphine Rash Review of Systems Review of Systems Constitutional: Negative. HENT: Negative. Eyes: Negative. Respiratory: Positive for cough. Cardiovascular: Negative. Gastrointestinal: Negative. Endocrine: Negative. Genitourinary: Negative. Musculoskeletal: Negative. Skin: Negative. Allergic/Immunologic: Negative. Neurological: Negative. Hematological: Negative. Psychiatric/Behavioral: Negative.    
 
 
Physical Exam  
 
Physical Exam 
Vitals signs and nursing note reviewed. Constitutional:   
   Appearance: She is well-developed. HENT:  
   Head: Normocephalic and atraumatic. Eyes:  
   Extraocular Movements: Extraocular movements intact. Pupils: Pupils are equal, round, and reactive to light. Neck: Musculoskeletal: Normal range of motion and neck supple. Cardiovascular:  
   Rate and Rhythm: Normal rate and regular rhythm. Pulmonary:  
   Effort: Pulmonary effort is normal.  
   Breath sounds: Normal breath sounds. Abdominal:  
   Palpations: Abdomen is soft. Musculoskeletal: Normal range of motion. Skin: 
   General: Skin is warm. Neurological:  
   General: No focal deficit present. Mental Status: She is alert. Psychiatric:     
   Mood and Affect: Mood normal.  
 
 
 
Lab and Diagnostic Study Results Labs - Recent Results (from the past 12 hour(s)) CBC WITH AUTOMATED DIFF Collection Time: 01/22/21  2:00 PM  
Result Value Ref Range WBC 6.0 4.4 - 11.3 K/uL  
 RBC 6.22 (H) 4.50 - 5.90 M/uL  
 HGB 18.0 (H) 13.5 - 17.5 g/dL HCT 54.2 (H) 36 - 46 % MCV 87.3 80 - 100 FL  
 MCH 29.0 (L) 31 - 34 PG  
 MCHC 33.2 31.0 - 36.0 g/dL  
 RDW 18.1 (H) 11.5 - 14.5 % PLATELET 790 K/uL MPV 9.3 6.5 - 11.5 FL  
 NRBC 20.0  WBC ABSOLUTE NRBC 0.01 K/uL NEUTROPHILS 80 (H) 42 - 75 % LYMPHOCYTES 13 (L) 20.5 - 51.1 % MONOCYTES 7 1.7 - 9.3 % EOSINOPHILS 0 (L) 0.9 - 2.9 % BASOPHILS 0 0.0 - 2.5 % ABS. NEUTROPHILS 4.8 1.8 - 7.7 K/UL  
 ABS. LYMPHOCYTES 0.8 (L) 1.0 - 4.8 K/UL  
 ABS. MONOCYTES 0.4 0.2 - 2.4 K/UL  
 ABS. EOSINOPHILS 0.0 0.0 - 0.7 K/UL  
 ABS. BASOPHILS 0.0 0.0 - 0.2 K/UL METABOLIC PANEL, COMPREHENSIVE Collection Time: 01/22/21  2:00 PM  
Result Value Ref Range Sodium 140 136 - 145 mmol/L Potassium 3.7 3.5 - 5.1 mmol/L Chloride 101 97 - 108 mmol/L  
 CO2 28 21 - 32 mmol/L Anion gap 11 5 - 15 mmol/L Glucose 97 65 - 100 mg/dL  BUN 18 6 - 20 mg/dL Creatinine 1.13 (H) 0.55 - 1.02 mg/dL BUN/Creatinine ratio 16 12 - 20 GFR est AA 56 (L) >60 ml/min/1.73m2 GFR est non-AA 46 (L) >60 ml/min/1.73m2 Calcium 10.4 (H) 8.5 - 10.1 mg/dL Bilirubin, total 1.3 (H) 0.2 - 1.0 mg/dL AST (SGOT) 64 (H) 15 - 37 U/L  
 ALT (SGPT) 32 12 - 78 U/L Alk. phosphatase 121 (H) 45 - 117 U/L Protein, total 8.9 (H) 6.4 - 8.2 g/dL Albumin 3.9 3.5 - 5.0 g/dL Globulin 5.0 (H) 2.0 - 4.0 g/dL A-G Ratio 0.8 (L) 1.1 - 2.2 Radiologic Studies -  
@lastxrresult@ CT Results  (Last 48 hours) None CXR Results  (Last 48 hours) 01/22/21 1346  XR CHEST PORT Final result Impression:  Impression: The cardiomediastinal silhouette is appropriate for age, technique,  
and lung expansion. Pulmonary vasculature is not congested. The lungs are  
essentially clear. No effusion or pneumothorax is seen. Narrative:  1 new comparison 11/7/2012 Medical Decision Making - I am the first provider for this patient. - I reviewed the vital signs, available nursing notes, past medical history, past surgical history, family history and social history. - Initial assessment performed. The patients presenting problems have been discussed, and they are in agreement with the care plan formulated and outlined with them. I have encouraged them to ask questions as they arise throughout their visit. Vital Signs-Reviewed the patient's vital signs. Patient Vitals for the past 12 hrs: 
 Temp Pulse Resp BP SpO2  
01/22/21 1340     99 % 01/22/21 1304 97.7 °F (36.5 °C) 93 20 133/76 99 % Records Reviewed: Nursing Notes The patient presents with cough with a differential diagnosis of pneumonia, COPD, Pneumothorax, Pulmonary emboli ED Course:  
Patient is feeling back to normal. Not in any  Distress. Breathing is stable. No intervention needed.  
  
 
Provider Notes (Medical Decision Making):  
Patient is feeling back to normal. Not in any  Distress. Breathing is stable. No intervention needed. MDM Procedures Medical Decision Makingedical Decision Making Performed by: Lesly Vargas MD 
PROCEDURESProcedures   None Disposition Disposition: DC-The patient was given verbal follow-up instructions Discharged DISCHARGE PLAN: 
1. There are no discharge medications for this patient. 2.  
Follow-up Information Follow up With Specialties Details Why Contact Info Shiraz Ramirez MD Family Medicine Call in 3 days If symptoms worsen [address] [facility]  
[address] [place] [address],[address] [facility] 
[phone] 3. Return to ED if worse 4. There are no discharge medications for this patient. Diagnosis Clinical Impression: 1. Viral syndrome Attestations: 
 
Lesly Vargas MD 
 
Please note that this dictation was completed with [employer], the computer voice recognition software. Quite often unanticipated grammatical, syntax, homophones, and other interpretive errors are inadvertently transcribed by the computer software. Please disregard these errors. Please excuse any errors that have escaped final proofreading. Thank you.

## 2021-01-28 PROBLEM — I10 ESSENTIAL HYPERTENSION: Status: ACTIVE | Noted: 2021-01-01

## 2021-01-28 PROBLEM — E86.0 DEHYDRATION: Status: ACTIVE | Noted: 2021-01-01

## 2021-01-28 PROBLEM — E78.5 HYPERLIPIDEMIA: Status: ACTIVE | Noted: 2021-01-01

## 2021-01-28 PROBLEM — R62.7 FAILURE TO THRIVE IN ADULT: Status: ACTIVE | Noted: 2021-01-01

## 2021-01-28 PROBLEM — E87.0 HYPERNATREMIA: Status: ACTIVE | Noted: 2021-01-01

## 2021-01-28 PROBLEM — U07.1 COVID-19: Status: ACTIVE | Noted: 2021-01-01

## 2021-01-28 NOTE — PROGRESS NOTES
Patient now desaturating. Ordered steroids. Also combative and will not comply with oxygen. Ordered Haldol for better compliance.

## 2021-01-28 NOTE — ED NOTES
Pt combative with staff, unable to keep O2 on, desating into 80s on RA, MD Emil Corbin made aware, will medicate per [facility].
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
## 2021-01-28 NOTE — ED PROVIDER NOTES
EMERGENCY DEPARTMENT HISTORY AND PHYSICAL EXAM 
? 
 
Date: 1/27/2021 Patient Name: Pako Blakely History of Presenting Illness Patient presents with: 
Altered mental status: Pt sent here from [address] for altered mental status, poor appetite, and dehydration. History Provided By: EMS and Nursing Home/SNF/Rehab Center HPI: Pako Blakely, [de-identified] y.o. female with a past medical history significant for hyperlipidemia presents to the ED with cc of poor appetite, weakness and altered mental status. There are no other complaints, changes, or physical findings at this time. PCP: Lazaro Rubi MD 
 
Current Facility-Administered Medications: 
  sodium chloride (NS) flush 5-40 mL, 5-40 mL, IntraVENous, Q8H, Yogesh Jacinto MD 
  sodium chloride (NS) flush 5-40 mL, 5-40 mL, IntraVENous, PRN, Isaura Oliveira MD 
  azithromycin (ZITHROMAX) 500 mg in 0.9% sodium chloride 250 mL (VIAL-MATE), 500 mg, IntraVENous, Q24H, Isaura Oliveira MD, 500 mg at 01/28/21 9807   heparin (porcine) injection 5,000 Units, 5,000 Units, SubCUTAneous, Q12H, Isaura Oliveira MD, 5,000 Units at 01/28/21 0037   acetaminophen (TYLENOL) tablet 650 mg, 650 mg, Oral, Q6H PRN, Isaura Oliveira MD 
 Or 
  acetaminophen (TYLENOL) suppository 650 mg, 650 mg, Rectal, Q6H PRN, Isaura Oliveira MD 
  guaiFENesin-dextromethorphan (ROBITUSSIN DM) 100-10 mg/5 mL syrup 5 mL, 5 mL, Oral, Q4H PRN, Isaura Oliveira MD 
  cholecalciferol (VITAMIN D3) (1000 Units /25 mcg) tablet 2,000 Units, 2,000 Units, Oral, DAILY, Yogesh Jacinto MD 
  zinc sulfate (ZINCATE) 220 (50) mg capsule 1 Cap, 1 Cap, Oral, DAILY, Yogesh Jacinto MD 
  famotidine (PEPCID) tablet 20 mg, 20 mg, Oral, BID, Isaura Oliveira MD 
  0.45% sodium chloride infusion, 125 mL/hr, IntraVENous, CONTINUOUS, Isaura Oliveira MD, Last Rate: 125 mL/hr at 01/28/21 0215, 125 mL/hr at 01/28/21 0215 Current Outpatient Medications:  potassium chloride (K-DUR, KLOR-CON) 20 mEq tablet, Take 20 mEq by mouth two (2) times a day., Disp: , Rfl:  
  acetaminophen (TylenoL) 325 mg tablet, Take 325 mg by mouth every four (4) hours as needed for Pain., Disp: , Rfl:  
  pantoprazole (Protonix) 40 mg tablet, Take 40 mg by mouth daily. , Disp: , Rfl:  
  atorvastatin (Lipitor) 20 mg tablet, Take 20 mg by mouth daily. , Disp: , Rfl:  
  tamsulosin (Flomax) 0.4 mg capsule, Take 0.4 mg by mouth daily. , Disp: , Rfl:  
  metoprolol tartrate (LOPRESSOR) 50 mg tablet, Take  by mouth two (2) times a day., Disp: , Rfl:  
  mirabegron ER (MYRBETRIQ) 50 mg ER tablet, Take 50 mg by mouth daily. , Disp: , Rfl:  
 
 
 
Past History Past Medical History: No past medical history on file. Past Surgical History: No past surgical history on file. Family History: No family history on file. Social History: 
Social History Tobacco Use Smoking status: Not on file Alcohol use: Not on file Drug use: Not on file Allergies: 
-- Codeine -- Rash 
-- Ibuprofen -- Unknown (comments) -- Morphine -- Rash [facility] Physical Exam 
[unfilled] Diagnostic Study Results Labs - Recent Results (from the past 12 hour(s)) -CBC WITH AUTOMATED DIFF Collection Time: 01/27/21  8:00 PM 
    Result                      Value             Ref Range WBC                         9.3               4.4 - 11.3 K* 
    RBC                         6.36 (H)          4.50 - 5.90 * HGB                         18.4 (H)          13.5 - 17.5 * HCT                         55.9 (H)          36 - 46 % MCV                         87.9              80 - 100 FL   
    MCH                         29.0 (L)          31 - 34 PG    
    MCHC                        33.0              31.0 - 36.0 * RDW                         18.7 (H)          11.5 - 14.5 % PLATELET                    143               K/uL MPV                         9.9               6.5 - 11.5 FL 
    NRBC                        0.7                WBC ABSOLUTE NRBC               0.07              K/uL NEUTROPHILS                 84 (H)            42 - 75 % LYMPHOCYTES                 9 (L)             20.5 - 51.1 % MONOCYTES                   7                 1.7 - 9.3 % EOSINOPHILS                 0 (L)             0.9 - 2.9 % BASOPHILS                   0                 0.0 - 2.5 % ABS. NEUTROPHILS            7.7               1.8 - 7.7 K/* ABS. LYMPHOCYTES            0.9 (L)           1.0 - 4.8 K/* ABS. MONOCYTES              0.7               0.2 - 2.4 K/* ABS. EOSINOPHILS            0.0               0.0 - 0.7 K/* ABS. BASOPHILS              0.0               0.0 - 0.2 K/* 
-METABOLIC PANEL, COMPREHENSIVE Collection Time: 01/27/21  8:00 PM 
    Result                      Value             Ref Range Sodium                      153 (H)           136 - 145 mm* Potassium                   3.1 (L)           3.5 - 5.1 mm* Chloride                    109 (H)           97 - 108 mmo* CO2                         30                21 - 32 mmol* Anion gap                   14                5 - 15 mmol/L Glucose                     106 (H)           65 - 100 mg/* BUN                         45 (H)            6 - 20 mg/dL Creatinine                  1.32 (H)          0.55 - 1.02 * BUN/Creatinine ratio        34 (H)            12 - 20 GFR est AA                  47 (L)            >60 ml/min/1* GFR est non-AA              39 (L)            >60 ml/min/1* Calcium                     10.8 (H)          8.5 - 10.1 m* Bilirubin, total            1.6 (H)           0.2 - 1.0 mg*     AST (SGOT)                  49 (H)            15 - 37 U/L   
    ALT (SGPT)                  24                12 - 78 U/L Alk. phosphatase            107               45 - 117 U/L Protein, total              8.6 (H)           6.4 - 8.2 g/* Albumin                     3.7               3.5 - 5.0 g/* Globulin                    4.9 (H)           2.0 - 4.0 g/* A-G Ratio                   0.8 (L)           1.1 - 2.2     
-SARS-COV-2 Collection Time: 01/27/21  8:02 PM 
    Result                      Value             Ref Range SARS-CoV-2                  Swab                            
-COVID-19 RAPID TEST Collection Time: 01/27/21  8:02 PM 
    Result                      Value             Ref Range Specimen source Nasopharyngeal 
    COVID-19 rapid test         DETECTED (A)      Not Detected* 
-URINALYSIS W/ RFLX MICROSCOPIC Collection Time: 01/27/21  8:05 PM 
    Result                      Value             Ref Range Color                       Yellow/Straw Appearance                  Clear             Clear Specific gravity            1.020             1.003 - 1.03* pH (UA)                     5.5               5.0 - 8.0 Protein                     Trace (A)         Negative mg/* Glucose                     Negative          Negative mg/* Ketone                      Negative          Negative mg/* Blood                       Negative          Negative Urobilinogen                1.0               0.2 - 1.0 EU* Nitrites                    Negative          Negative Leukocyte Esterase          Negative          Negative Bilirubin UA, confirm       Positive (A)      Negative -BILIRUBIN, CONFIRM Collection Time: 01/27/21  8:05 PM 
    Result                      Value             Ref Range Bilirubin UA, confirm       Positive (A)      Negative      
-URINE MICROSCOPIC Collection Time: 01/27/21  8:05 PM 
    Result Value             Ref Range WBC                         5-10              0 - 5 /hpf    
    RBC                         0-5               0 - 3 /hpf Bacteria                    1+ (A)            Negative /hpf Mucus                       2+ (A)            Negative /lpf Granular cast               >20 (A)           Negative /lpf 
-BUN Collection Time: 01/28/21  2:29 AM 
    Result                      Value             Ref Range BUN                         46 (H)            6 - 20 mg/dL  
-MAGNESIUM Collection Time: 01/28/21  2:29 AM 
    Result                      Value             Ref Range Magnesium                   2.3               1.6 - 2.4 mg* Radiologic Studies -  
XR CHEST PORT Final Result The heart size is within normal limits. No evidence of focal 
 airspace consolidation. No evidence of pulmonary edema. No pneumothorax. Atherosclerotic arterial calcification. CT Results  (Last 48 hours) None CXR Results  (Last 48 hours) 01/27/21 2155  XR CHEST PORT Final result Impression: The heart size is within normal limits. No evidence of focal  
 
airspace consolidation. No evidence of pulmonary edema. No pneumothorax. Atherosclerotic arterial calcification. Narrative:  Chest, AP portable, 2149 hours. Comparison with 1/28/2016. There is incomplete imaging of the lung bases. Slight leftward patient  
obliquity. Medical Decision Making and ED Course I am the first provider for this patient. I reviewed the vital signs, available nursing notes, past medical history, past surgical history, family history and social history. Vital Signs-Reviewed the patient's vital signs. Empty flowsheet group. EKG interpretation: (Preliminary) Rhythm: sinus tachycardia; and regular . Rate (approx.): 109; Axis: normal; ME interval: normal; QRS interval: normal ; ST/T wave: non-specific changes;  Other findings: abnormal ekg. Records Reviewed: Nursing Notes Provider Notes (Medical Decision Making):  
Patient presents with altered mental status and general weakness with positive Covid. She appears dry. The patient presents with differential diagnosis of dehydration, Covid infection, sepsis. ED Course:  
Initial assessment performed. The patients presenting problems have been discussed, and they are in agreement with the care plan formulated and outlined with them. I have encouraged them to ask questions as they arise throughout their visit. Disposition Admitted Diagnosis Clinical Impression: COVID-19  (primary encounter diagnosis) Dehydration Kayli Romo MD 
 
Please note that this dictation was completed with [employer], the [employer] voice recognition software. Quite often unanticipated grammatical, syntax, homophones, and other interpretive errors are inadvertently transcribed by the computer software. Please disregard these errors. Please excuse any errors that have escaped final proofreading. Thank you. 
 
? No past medical history on file. No past surgical history on file. No family history on file. Social History Socioeconomic History  Marital status:  Spouse name: Not on file  Number of children: Not on file  Years of education: Not on file  Highest education level: Not on file Occupational History  Not on file Social Needs  Financial resource strain: Not on file  Food insecurity Worry: Not on file Inability: Not on file  Transportation needs Medical: Not on file Non-medical: Not on file Tobacco Use  Smoking status: Not on file Substance and Sexual Activity  Alcohol use: Not on file  Drug use: Not on file  Sexual activity: Not on file Lifestyle  Physical activity Days per week: Not on file Minutes per session: Not on file  Stress: Not on file Relationships  Social connections Talks on phone: Not on file Gets together: Not on file Attends [employer] service: Not on file Active member of club or organization: Not on file Attends meetings of clubs or organizations: Not on file Relationship status: Not on file  Intimate partner violence Fear of current or ex partner: Not on file Emotionally abused: Not on file Physically abused: Not on file Forced sexual activity: Not on file Other Topics Concern  Not on file Social History Narrative  Not on file ALLERGIES: Codeine, Ibuprofen, and Morphine Review of Systems Unable to perform ROS: Dementia Vitals:  
 01/28/21 0000 01/28/21 0030 01/28/21 0216 01/28/21 2486 BP: 138/83 130/85 128/87 (!) 140/82 Pulse: 100 (!) 104 (!) 105 (!) 102 Resp: 20 20 17 21 Temp:   98 °F (36.7 °C) SpO2: 92% 94% 99% 100% Physical Exam 
Vitals signs and nursing note reviewed. Constitutional:   
   Appearance: Normal appearance. HENT:  
   Head: Normocephalic and atraumatic. Right Ear: Tympanic membrane and ear canal normal.  
   Left Ear: Tympanic membrane and ear canal normal.  
   Nose: Nose normal.  
   Mouth/Throat:  
   Mouth: Mucous membranes are moist.  
   Pharynx: Oropharynx is clear. Comments: Dry Eyes:  
   Extraocular Movements: Extraocular movements intact. Conjunctiva/sclera: Conjunctivae normal.  
   Pupils: Pupils are equal, round, and reactive to light. Neck: Musculoskeletal: Normal range of motion and neck supple. Cardiovascular:  
   Rate and Rhythm: Normal rate and regular rhythm. Pulses: Normal pulses. Heart sounds: Normal heart sounds. Pulmonary:  
   Effort: Pulmonary effort is normal.  
   Breath sounds: Normal breath sounds. Abdominal:  
   General: Abdomen is flat. Bowel sounds are normal.  
   Palpations: Abdomen is soft. Musculoskeletal: Normal range of motion.   
Skin: 
 General: Skin is warm and dry. Neurological:  
   General: No focal deficit present. Mental Status: She is alert. She is confused. GCS: GCS eye subscore is 4. GCS verbal subscore is 5. GCS motor subscore is 6. Cranial Nerves: No facial asymmetry. Psychiatric:     
   Mood and Affect: Mood normal.     
   Behavior: Behavior normal.  
 
  
 
MDM Number of Diagnoses or Management Options Amount and/or Complexity of Data Reviewed Clinical lab tests: reviewed Tests in the radiology section of CPT®: reviewed Risk of Complications, Morbidity, and/or Mortality Presenting problems: high Diagnostic procedures: moderate Management options: moderate Patient Progress Patient progress: stable Procedures

## 2021-01-28 NOTE — H&P
GENERAL GENERIC H&P/CONSULT Subjective:  [de-identified]year old female from nursing home for poor appetite and intake for several days. Seen here on the 22nd for cough and fever and diagnosed with \"viral syndrome\". She returns now with increasing lethargy and food and water refusal.  Testing today reveals a + COVID test which has likely been present since she developed fever and shortness of breath on the 22nd. She does not answer any questions today and rolls back and forth and curls up in the fetal position to avoid the examiner. There were evidently problems with the copy machine at the facility so no papers were sent. She is reportedly a full code and the nurses had obtained her usual medications via phone for me and are placing them in the computer. There was no reported vomiting or diarrhea. Labs here revealed that she was dehydrated with BAYRON. Fortunately her oxygen saturation was greater than 95% on room air and she has no work of breathing noted. She was admitted for fluid replacement. Past medical history (deduced from meds she is on as there are no records) HTN Hyperlipidemia GERD Urinary issues Allergies Allergen Reactions  Codeine Rash  Ibuprofen Unknown (comments)  Morphine Rash Social History Tobacco Use  Smoking status: Not on file Substance Use Topics  Alcohol use: Not on file  
 lives in nursing home No family history obtainable Review of Systems Unable to perform ROS: Mental status change Constitutional: Positive for activity change and fever. HENT: Negative. Eyes: Negative. Respiratory: Positive for shortness of breath. Cardiovascular: Negative for chest pain and leg swelling. Endocrine: Negative. Genitourinary: Negative. Musculoskeletal: Negative. Skin: Negative for color change. Allergic/Immunologic: Negative. Neurological: Negative. Hematological: Negative. Psychiatric/Behavioral: Negative.    
All other systems reviewed and are negative. Objective: No intake/output data recorded. No intake/output data recorded. Patient Vitals for the past 8 hrs: 
 BP Temp Pulse Resp SpO2  
01/28/21 0030 130/85  (!) 104 20 94 % 01/28/21 0000 138/83  100 20 92 % 01/27/21 2325 94/83 97.9 °F (36.6 °C) (!) 104 18 93 % 01/27/21 2300 94/63  88 16 99 % 01/27/21 2230 119/82  95 20 95 % 01/27/21 2200 139/84  (!) 116 22 96 % Physical Exam 
Vitals signs and nursing note reviewed. Constitutional:   
   Comments: Wakes up, fusses for being wakened and goes back to sleep. Answered no questions HENT:  
   Head: Normocephalic and atraumatic. Mouth/Throat:  
   Mouth: Mucous membranes are dry. Eyes:  
   Pupils: Pupils are equal, round, and reactive to light. Neck: Musculoskeletal: Normal range of motion. Cardiovascular:  
   Rate and Rhythm: Normal rate and regular rhythm. Pulses: Normal pulses. Pulmonary:  
   Effort: Pulmonary effort is normal.  
   Comments: Diminished breath sounds Abdominal:  
   General: There is no distension. Palpations: Abdomen is soft. Tenderness: There is no abdominal tenderness. Musculoskeletal:     
   General: No swelling. Skin: 
   General: Skin is warm and dry. Capillary Refill: Capillary refill takes less than 2 seconds. Coloration: Skin is not pale. Findings: No erythema or rash. Neurological:  
   General: No focal deficit present. Mental Status: She is alert. Comments: Wakes and rolls away from stimuli, curls up in fetal position Psychiatric:  
   Comments: Unable to assess Labs:   
Recent Results (from the past 24 hour(s)) CBC WITH AUTOMATED DIFF Collection Time: 01/27/21  8:00 PM  
Result Value Ref Range WBC 9.3 4.4 - 11.3 K/uL  
 RBC 6.36 (H) 4.50 - 5.90 M/uL  
 HGB 18.4 (H) 13.5 - 17.5 g/dL HCT 55.9 (H) 36 - 46 %  MCV 87.9 80 - 100 FL  
 MCH 29.0 (L) 31 - 34 PG  
 MCHC 33.0 31.0 - 36.0 g/dL  
 RDW 18.7 (H) 11.5 - 14.5 % PLATELET 545 K/uL MPV 9.9 6.5 - 11.5 FL  
 NRBC 0.7  WBC ABSOLUTE NRBC 0.07 K/uL NEUTROPHILS 84 (H) 42 - 75 % LYMPHOCYTES 9 (L) 20.5 - 51.1 % MONOCYTES 7 1.7 - 9.3 % EOSINOPHILS 0 (L) 0.9 - 2.9 % BASOPHILS 0 0.0 - 2.5 % ABS. NEUTROPHILS 7.7 1.8 - 7.7 K/UL  
 ABS. LYMPHOCYTES 0.9 (L) 1.0 - 4.8 K/UL  
 ABS. MONOCYTES 0.7 0.2 - 2.4 K/UL  
 ABS. EOSINOPHILS 0.0 0.0 - 0.7 K/UL  
 ABS. BASOPHILS 0.0 0.0 - 0.2 K/UL METABOLIC PANEL, COMPREHENSIVE Collection Time: 01/27/21  8:00 PM  
Result Value Ref Range Sodium 153 (H) 136 - 145 mmol/L Potassium 3.1 (L) 3.5 - 5.1 mmol/L Chloride 109 (H) 97 - 108 mmol/L  
 CO2 30 21 - 32 mmol/L Anion gap 14 5 - 15 mmol/L Glucose 106 (H) 65 - 100 mg/dL BUN 45 (H) 6 - 20 mg/dL Creatinine 1.32 (H) 0.55 - 1.02 mg/dL BUN/Creatinine ratio 34 (H) 12 - 20 GFR est AA 47 (L) >60 ml/min/1.73m2 GFR est non-AA 39 (L) >60 ml/min/1.73m2 Calcium 10.8 (H) 8.5 - 10.1 mg/dL Bilirubin, total 1.6 (H) 0.2 - 1.0 mg/dL AST (SGOT) 49 (H) 15 - 37 U/L  
 ALT (SGPT) 24 12 - 78 U/L Alk. phosphatase 107 45 - 117 U/L Protein, total 8.6 (H) 6.4 - 8.2 g/dL Albumin 3.7 3.5 - 5.0 g/dL Globulin 4.9 (H) 2.0 - 4.0 g/dL A-G Ratio 0.8 (L) 1.1 - 2.2 SARS-COV-2 Collection Time: 01/27/21  8:02 PM  
Result Value Ref Range SARS-CoV-2 Swab COVID-19 RAPID TEST Collection Time: 01/27/21  8:02 PM  
Result Value Ref Range Specimen source Nasopharyngeal    
 COVID-19 rapid test DETECTED (A) Not Detected URINALYSIS W/ RFLX MICROSCOPIC Collection Time: 01/27/21  8:05 PM  
Result Value Ref Range Color Yellow/Straw Appearance Clear Clear Specific gravity 1.020 1.003 - 1.030    
 pH (UA) 5.5 5.0 - 8.0 Protein Trace (A) Negative mg/dL Glucose Negative Negative mg/dL Ketone Negative Negative mg/dL Blood Negative Negative Urobilinogen 1.0 0.2 - 1.0 EU/dL Nitrites Negative Negative Leukocyte Esterase Negative Negative Bilirubin UA, confirm Positive (A) Negative BILIRUBIN, CONFIRM Collection Time: 01/27/21  8:05 PM  
Result Value Ref Range Bilirubin UA, confirm Positive (A) Negative URINE MICROSCOPIC Collection Time: 01/27/21  8:05 PM  
Result Value Ref Range WBC 5-10 0 - 5 /hpf  
 RBC 0-5 0 - 3 /hpf Bacteria 1+ (A) Negative /hpf Mucus 2+ (A) Negative /lpf Granular cast >20 (A) Negative /lpf EKG NSR no acute ischemia CHEST XRAY no acute infiltrates Assessment: 
Principal Problem: 
  COVID-19 (1/28/2021) Active Problems: 
  Dehydration (1/28/2021) Hypernatremia (1/28/2021) Failure to thrive in adult (1/28/2021) Essential hypertension (1/28/2021) Hyperlipidemia (1/28/2021) Plan: 
 
COVID Isolation Monitor oxygenation and start steroids if hypoxia ensues Consider Remdesivir, CCP Hypernatremic dehydration Ordered fluids Repeat labs in am 
  Will need to assess intake and output Hypertension Usually on metoprolol Holding but will need to start once BP and hydration improve Hyperlipidemia 
  usual med DVT prophylaxis Ordered heparin q12 Can switch to lovenox if BAYRON improves May need full dose if dimer high (ordered) Poor records Obtain records from nursing home Verify code status Signed: 
Josie Mendez MD 1/28/2021

## 2021-01-28 NOTE — ED NOTES
Attempted to call [facility] x 2 for updated medical HX. Vm left for pts primary nurse to call back or to fax information due to no info being listed in chart.

## 2021-01-28 NOTE — ED NOTES
Pt not compliant with Iv start, PT not alowing writer to change brief. PRN ativan administered as ordered. PT ate 4 bites of applesauce.

## 2021-01-28 NOTE — ED NOTES
Pt with rhythm change on monitor, will attempt to obtain new EKG, pt fighting staff--MD Jacinto aware of this.

## 2021-01-28 NOTE — ED NOTES
Pt combative with staff, slapping and yelling for this RN to [facility] my room\" Ketty, ED Tech at bedside, pt familiar with her and agreeable to IV meds at this time.

## 2021-01-28 NOTE — ED NOTES
22GIv placed in pts left wrist via ultrasound, PT cooperative at this time. Pt refuses to keep NC on sats 97% on RA at this time

## 2021-01-28 NOTE — PROGRESS NOTES
Patient seen and examined. H&P reviewed. COPD exacerbation 
-Patient is currently not hypoxic 
-Continue steroid and breathing treatment as needed Abnormal D-dimer 
-Elevated D-dimer of 16.93 likely from COVID-19 infection 
-Patient is not hypoxic and therefore will hold off on CTA of the chest 
-Anticoagulation per Covid protocol COVID-19 infection 
-Patient with no acute finding on chest x-ray 
-Not hypoxic at current Hypernatremia 
-Continue current IV fluid 
-Monitor daily labs Elevated creatinine 
-Continue current IV fluid 
-Monitor daily renal function Altered mental status 
-Unclear baseline mental status 
-Could be metabolic encephalopathy due to renal insufficiency and electrolyte imbalance Hypertension 
-Resume metoprolol Dyslipidemia 
-Resume statin

## 2021-01-29 NOTE — PROGRESS NOTES
Progress Note Patient: Omari Arizmendi MRN: [id number]  SSN: [id number] YOB: 1940  Age: [de-identified] y.o. Sex: female Admit Date: 1/27/2021 LOS: 1 day Subjective:  
 
Patient appears more alert today. Still confused. Unclear baseline mental status. Patient does not communicate. Objective:  
 
Vitals:  
 01/29/21 0630 01/29/21 0701 01/29/21 0951 01/29/21 1150 BP: (!) 146/77  126/86 (!) 141/83 Pulse: 89  97 (!) 107 Resp: 16 16 21 Temp: 98.1 °F (36.7 °C) SpO2: 97% 97% 96% 97% Intake and Output: 
Current Shift: No intake/output data recorded. Last three shifts: No intake/output data recorded. Physical Exam:  
GENERAL: Alert but does not communicate THROAT & NECK: normal and no erythema or exudates noted. LUNG: clear to auscultation bilaterally HEART: regular rate and rhythm, S1, S2 normal, no murmur, click, rub or gallop ABDOMEN: soft, non-tender. Bowel sounds normal. No masses,  no organomegaly EXTREMITIES:  extremities normal, atraumatic, no cyanosis or edema SKIN: no rash or abnormalities NEUROLOGIC: No acute deficit PSYCHIATRIC: non focal 
 
Lab/Data Review: All lab results for the last 24 hours reviewed. Recent Results (from the past 24 hour(s)) PTT Collection Time: 01/29/21  9:51 AM  
Result Value Ref Range aPTT 26.2 22.1 - 31.0 sec  
 aPTT, therapeutic range   68 - 109 sec LD Collection Time: 01/29/21  9:51 AM  
Result Value Ref Range  (H) 81 - 264 U/L  
D DIMER Collection Time: 01/29/21  9:51 AM  
Result Value Ref Range D-dimer 24.66 (H) 0.19 - 0.50 mg/L FEU  
TROPONIN I Collection Time: 01/29/21  9:51 AM  
Result Value Ref Range Troponin-I, Qt. <0.05 <0.05 ng/mL LACTIC ACID Collection Time: 01/29/21  9:51 AM  
Result Value Ref Range Lactic acid 2.2 (HH) 0.4 - 2.0 mmol/L METABOLIC PANEL, BASIC Collection Time: 01/29/21  9:51 AM  
Result Value Ref Range Sodium 153 (H) 136 - 145 mmol/L  Potassium 3.2 (L) 3.5 - 5.1 mmol/L Chloride 113 (H) 97 - 108 mmol/L  
 CO2 25 21 - 32 mmol/L Anion gap 15 5 - 15 mmol/L Glucose 101 (H) 65 - 100 mg/dL BUN 47 (H) 6 - 20 mg/dL Creatinine 1.17 (H) 0.55 - 1.02 mg/dL BUN/Creatinine ratio 40 (H) 12 - 20 GFR est AA 54 (L) >60 ml/min/1.73m2 GFR est non-AA 45 (L) >60 ml/min/1.73m2 Calcium 10.3 (H) 8.5 - 10.1 mg/dL Imaging: No results found. Assessment and Plan: COPD exacerbation 
-Patient is currently not hypoxic 
-Continue steroid and breathing treatment as needed 
  
Abnormal D-dimer 
-Elevated D-dimer of 16.93 likely from COVID-19 infection 
-Patient is not hypoxic and therefore will hold off on CTA of the chest 
-Anticoagulation per Covid protocol 
  
COVID-19 infection 
-Patient with no acute finding on chest x-ray 
-Not hypoxic at current 
  
Hypernatremia 
-Continue current IV fluid 
-Monitor daily labs 
  
Elevated creatinine 
-Continue current IV fluid 
-Monitor daily renal function 
  
Altered mental status 
-Unclear baseline mental status 
-Could be metabolic encephalopathy due to renal insufficiency and electrolyte imbalance 
  
Hypertension 
-Continue metoprolol 
  
Dyslipidemia 
-Continue statin Anticipated disposition is 24 to 48 hours pending progress. Signed By: Angy Ahumada MD   
 January 29, 2021

## 2021-01-29 NOTE — PROGRESS NOTES
Pt with BM #2 today; note bright red blood; medium sized movement; no distress noted; Dr. Fragoso Every notified; ordered to discontinue Heparin; no other orders rec'd at this time; will continue to monitor.

## 2021-01-29 NOTE — ED NOTES
Multiple attempts made by this RN, Dago Jackson RN, nursing supervisor, Dr. Jasper Read, and Donell Garcia NP for IV access without success. Per Patricio Spear NP patient will have to wait for PICC team in the morning. Per nursing supervisor, patient cannot go to inpatient bed assignment without IV access. Patient will continue to hold in ED at this time.
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
## 2021-01-29 NOTE — PROGRESS NOTES
Nurse called to notify that patient had bloody bowel movement. Vital signs are currently stable. Heparin discontinued and started on IV Protonix. Monitor H&H every 8 hour. Patient's sister in the chart called and updated.

## 2021-01-29 NOTE — PROGRESS NOTES
Notify Dr. Malia Ulrich of inability to obtain bloodwork with US and via ; no info rec'd regarding PICC; will reach out to determine time for placement.

## 2021-01-29 NOTE — ED NOTES
Patient moved up in the bed. Soiled brief removed, natalee-care provided, new brief placed. Linens changed.

## 2021-01-30 NOTE — PROGRESS NOTES
Progress Note Date:1/30/2021       Room:220/01 Patient Wyatt Ramires     YOB: 1940     Age:81 y.o. Subjective [de-identified]year old female from nursing home for poor appetite and intake for several days. Seen here on the 22nd for cough and fever and diagnosed with \"viral syndrome\". She returns now with increasing lethargy and food and water refusal.  Testing today reveals a + COVID test which has likely been present since she developed fever and shortness of breath on the 22nd. She does not answer any questions today and rolls back and forth and curls up in the fetal position to avoid the examiner. There were evidently problems with the copy machine at the facility so no papers were sent. She is reportedly a full code and the nurses had obtained her usual medications via phone for me and are placing them in the computer. There was no reported vomiting or diarrhea. Labs here revealed that she was dehydrated with BAYRON. Fortunately her oxygen saturation was greater than 95% on room air and she has no work of breathing noted. She was admitted for fluid replacement. 
  
 
 
Patient seen and evaluated. Resting in bed. Sill confused. Nonverbal.  
Has good O2 saturation and has been tapered down to room air. Afebrile Review of Systems Unable to perform ROS: Dementia Objective Vitals Last 24 Hours: 
Patient Vitals for the past 24 hrs: 
 Temp Pulse Resp BP SpO2  
01/30/21 1631 97.4 °F (36.3 °C) (!) 52 18 123/62 99 % 01/30/21 1202 97.5 °F (36.4 °C) (!) 54 16 (!) 108/51 97 % 01/30/21 0732 97.6 °F (36.4 °C) (!) 56 16 (!) 146/62 97 % 01/30/21 0336  (!) 56 16 (!) 151/71 96 % 01/29/21 2200  74 16 (!) 145/96 95 % 01/29/21 2132  76  (!) 155/99   
01/29/21 2000  88 20 (!) 166/92 96 % 01/29/21 1900  67 18 138/75 95 % I/O (24Hr): Intake/Output Summary (Last 24 hours) at 1/30/2021 1850 Last data filed at 1/30/2021 1410 Gross per 24 hour Intake 535 ml Output  Net 535 ml Physical Exam: 
General: Alert, cooperative, no distress, appears stated age. Head:  Normocephalic, without obvious abnormality, atraumatic. Eyes:  Conjunctivae/corneas clear. Pupils equal, round, reactive to light. Extraocular movements intact. Lungs:  Clear to auscultation bilaterally. no wheeze, rales, crackles, rhonchi Chest wall: No tenderness or deformity. Heart:  Regular rate and rhythm, S1, S2 normal, no murmur, click, rub or gallop. Abdomen:  Soft, non-tender. Bowel sounds normal. No masses,  No organomegaly. Extremities: Extremities normal, atraumatic, no cyanosis or edema. Pulses: 2+ and symmetric all extremities. Skin: Skin color, texture, turgor normal. No rashes or lesions Neurologic: Awake, Alert, oriented. No obvious gross sensory or motor deficits Medications Current Facility-Administered Medications Medication Dose Route Frequency  atorvastatin (LIPITOR) tablet 20 mg  20 mg Oral DAILY  tamsulosin (FLOMAX) capsule 0.4 mg  0.4 mg Oral DAILY  methylPREDNISolone (PF) (SOLU-MEDROL) injection 40 mg  40 mg IntraVENous Q12H  
 metoprolol tartrate (LOPRESSOR) tablet 50 mg  50 mg Oral Q12H  pantoprazole (PROTONIX) 40 mg in 0.9% sodium chloride 10 mL injection  40 mg IntraVENous Q12H  
 sodium chloride (NS) flush 5-40 mL  5-40 mL IntraVENous Q8H  
 sodium chloride (NS) flush 5-40 mL  5-40 mL IntraVENous PRN  
 azithromycin (ZITHROMAX) 500 mg in 0.9% sodium chloride 250 mL (VIAL-MATE)  500 mg IntraVENous Q24H  
 acetaminophen (TYLENOL) tablet 650 mg  650 mg Oral Q6H PRN Or  
 acetaminophen (TYLENOL) suppository 650 mg  650 mg Rectal Q6H PRN  
 guaiFENesin-dextromethorphan (ROBITUSSIN DM) 100-10 mg/5 mL syrup 5 mL  5 mL Oral Q4H PRN  cholecalciferol (VITAMIN D3) (1000 Units /25 mcg) tablet 2,000 Units  2,000 Units Oral DAILY  zinc sulfate (ZINCATE) 220 (50) mg capsule 1 Cap  1 Cap Oral DAILY  famotidine (PEPCID) tablet 20 mg 20 mg Oral BID  
 0.45% sodium chloride infusion  125 mL/hr IntraVENous CONTINUOUS  
 haloperidol lactate (HALDOL) injection 2 mg  2 mg IntraVENous Q6H PRN  
 LORazepam (ATIVAN) tablet 1 mg  1 mg Oral Q6H PRN  
 [Held by provider] heparin (porcine) injection 5,000 Units  5,000 Units SubCUTAneous Q8H Allergies Codeine, Ibuprofen, and Morphine Labs/Imaging/Diagnostics Labs: 
Recent Results (from the past 48 hour(s)) PTT Collection Time: 01/29/21  9:51 AM  
Result Value Ref Range aPTT 26.2 22.1 - 31.0 sec  
 aPTT, therapeutic range   68 - 109 sec FIBRINOGEN Collection Time: 01/29/21  9:51 AM  
Result Value Ref Range Fibrinogen 283 200 - 475 mg/dL C REACTIVE PROTEIN, QT Collection Time: 01/29/21  9:51 AM  
Result Value Ref Range C-Reactive protein 3.15 mg/dL LD Collection Time: 01/29/21  9:51 AM  
Result Value Ref Range  (H) 81 - 264 U/L FERRITIN Collection Time: 01/29/21  9:51 AM  
Result Value Ref Range Ferritin 455 (H) 8 - 252 ng/mL D DIMER Collection Time: 01/29/21  9:51 AM  
Result Value Ref Range D-dimer 24.66 (H) 0.19 - 0.50 mg/L FEU  
TROPONIN I Collection Time: 01/29/21  9:51 AM  
Result Value Ref Range Troponin-I, Qt. <0.05 <0.05 ng/mL LACTIC ACID Collection Time: 01/29/21  9:51 AM  
Result Value Ref Range Lactic acid 2.2 (HH) 0.4 - 2.0 mmol/L  
VITAMIN D, 25 HYDROXY Collection Time: 01/29/21  9:51 AM  
Result Value Ref Range Vitamin D 25-Hydroxy 43.2 30 - 100 ng/mL METABOLIC PANEL, BASIC Collection Time: 01/29/21  9:51 AM  
Result Value Ref Range Sodium 153 (H) 136 - 145 mmol/L Potassium 3.2 (L) 3.5 - 5.1 mmol/L Chloride 113 (H) 97 - 108 mmol/L  
 CO2 25 21 - 32 mmol/L Anion gap 15 5 - 15 mmol/L Glucose 101 (H) 65 - 100 mg/dL BUN 47 (H) 6 - 20 mg/dL Creatinine 1.17 (H) 0.55 - 1.02 mg/dL BUN/Creatinine ratio 40 (H) 12 - 20 GFR est AA 54 (L) >60 ml/min/1.73m2  GFR est non-AA 45 (L) >60 ml/min/1.73m2 Calcium 10.3 (H) 8.5 - 10.1 mg/dL HGB & HCT Collection Time: 01/29/21  8:10 PM  
Result Value Ref Range HGB 14.8 13.5 - 17.5 g/dL HCT 44.2 36 - 46 % BUN Collection Time: 01/29/21  8:30 PM  
Result Value Ref Range BUN 36 (H) 6 - 20 mg/dL METABOLIC PANEL, BASIC Collection Time: 01/30/21  6:45 AM  
Result Value Ref Range Sodium 149 (H) 136 - 145 mmol/L Potassium 4.0 3.5 - 5.1 mmol/L Chloride 111 (H) 97 - 108 mmol/L  
 CO2 24 21 - 32 mmol/L Anion gap 14 5 - 15 mmol/L Glucose 104 (H) 65 - 100 mg/dL BUN 30 (H) 6 - 20 mg/dL Creatinine 0.84 0.55 - 1.02 mg/dL BUN/Creatinine ratio 36 (H) 12 - 20 GFR est AA >60 >60 ml/min/1.73m2 GFR est non-AA >60 >60 ml/min/1.73m2 Calcium 9.1 8.5 - 10.1 mg/dL HGB & HCT Collection Time: 01/30/21  6:45 AM  
Result Value Ref Range HGB 15.4 13.5 - 17.5 g/dL HCT 46.9 (H) 36 - 46 % OCCULT BLOOD, STOOL Collection Time: 01/30/21  2:30 PM  
Result Value Ref Range Occult Blood,day 1 Positive Imaging: Xr Chest [facility] Result Date: 1/27/2021 The heart size is within normal limits. No evidence of focal airspace consolidation. No evidence of pulmonary edema. No pneumothorax. Atherosclerotic arterial calcification. Assessment//Plan Problem List: 
Hospital Problems  Never Reviewed Codes Class Noted POA * (Principal) COVID-19 ICD-10-CM: U07.1 ICD-9-CM: 079.89  1/28/2021 Yes Dehydration ICD-10-CM: E86.0 ICD-9-CM: 276.51  1/28/2021 Yes Hypernatremia ICD-10-CM: E87.0 ICD-9-CM: 276.0  1/28/2021 Yes Failure to thrive in adult ICD-10-CM: R62.7 ICD-9-CM: 783.7  1/28/2021 Yes Essential hypertension ICD-10-CM: I10 
ICD-9-CM: 401.9  1/28/2021 Yes Hyperlipidemia ICD-10-CM: E78.5 ICD-9-CM: 272.4  1/28/2021 Yes COPD exacerbation 
-Patient is currently not hypoxic 
-Continue steroid and breathing treatment as needed 
  Abnormal D-dimer 
-Elevated D-dimer of 16.93 likely from COVID-19 infection 
-Patient is not hypoxic and therefore will hold off on CTA of the chest 
-Anticoagulation per Covid protocol initially but patient had bloody bowel movement and noted that occult blood is positive so discontinued heparin drip 
  
COVID-19 infection 
-Patient with no acute finding on chest x-ray 
-Rapid Covid test on 1/27 was positive and PCR testing also positive 
-Not hypoxic at current so no remdesevir or steroids. -C-reactive protein on downward trend 
-Procalcitonin normal range 
  
Hypernatremia 
-Initially 153 on admission has been on downward trend 
-Continue current IV fluid 
-Monitor daily labs 
  
Elevated creatinine 
-Continue current IV fluid 
-Monitor daily renal function 
-1/30: Resolved 
  
Altered mental status 
-Unclear baseline mental status 
-Could be metabolic encephalopathy due to renal insufficiency and electrolyte imbalance Bloody bowel movement 
-Noted on 1/29 so heparin drip was discontinued 
-Monitor with serial H&H's and stable hemoglobin of 15.4 
-Occult blood noted to be positive 
-Monitor for recurrent episodes and monitor H&H 
-GI consultation Hypertension 
-Continue metoprolol 
-Every 4 hours Dyslipidemia 
-Continue statin GI prophylaxis -IV Protonix twice daily DVT prophylaxis 
-Contraindicated secondary to bloody bowel movement Full Code Spent 35 minutes evaluting and coordinating patient care of which >50% was spent coordinating and counseling.   
 
 
Electronically signed by Oxana Tejada MD on 1/30/2021 at 6:50 PM

## 2021-01-30 NOTE — ROUTINE PROCESS
Bedside shift change report given to ham don (oncoming nurse) by Racheal Muñoz (offgoing nurse). Report included the following information SBAR.

## 2021-01-30 NOTE — ROUTINE PROCESS
No s/s of distress or pain. Patient is lethargic. Unable to answer questions for admission. Performed assessment. Skin is intact. Rash is noted in the perineal area. Patient is currently sleeping. Will continue to monitor.

## 2021-01-30 NOTE — ED NOTES
Patient care and report received from [facility]. The patient is lying in left lateral position with her eyes closed. The RN from the infusion company is at the bedside attempting to insert a PICC line.

## 2021-01-30 NOTE — ED NOTES
The patient needs to have Protonix given today at 0300 due to IV access issues. This information was passed on to the [facility] staff with the patient report.

## 2021-01-30 NOTE — PROGRESS NOTES
Problem: Falls - Risk of 
Goal: *Absence of Falls Description: Document Earma [place] Fall Risk and appropriate interventions in the flowsheet. Outcome: Progressing Towards Goal 
Note: Fall Risk Interventions: 
  
 
  
 
  
 
  
 
  
 
 
  
Problem: Patient Education: Go to Patient Education Activity Goal: Patient/Family Education Outcome: Progressing Towards Goal 
  
Problem: Pressure Injury - Risk of 
Goal: *Prevention of pressure injury Description: Document Marcelino Scale and appropriate interventions in the flowsheet. Outcome: Progressing Towards Goal 
Note: Pressure Injury Interventions: 
  
 
  
 
  
 
  
 
  
 
  
 
  
 
 
 
  
Problem: Patient Education: Go to Patient Education Activity Goal: Patient/Family Education Outcome: Progressing Towards Goal

## 2021-01-30 NOTE — ED NOTES
TRANSFER - OUT REPORT: 
 
Verbal report given to [facility], [place] on Jamie Bachelor  being transferred to [facility] # [id number] for routine progression of care Report consisted of patients Situation, Background, Assessment and  
Recommendations(SBAR). Information from the following report(s) SBAR was reviewed with the receiving nurse. Opportunity for questions and clarification was provided. Patient transported with: 
 [employer]

## 2021-01-30 NOTE — ROUTINE PROCESS
Lab call states pt. Is hard stick check with  About drawing h&h 15.4 okay to not draw per dr. Khan Rom.

## 2021-01-30 NOTE — ROUTINE PROCESS
TRANSFER - IN REPORT: 
 
Verbal report received from Barb Robison on Aubrey Jaramillo  being received from ER for routine progression of care Report consisted of patients Situation, Background, Assessment and  
Recommendations(SBAR). Information from the following report(s) SBAR was reviewed with the receiving nurse. Opportunity for questions and clarification was provided. Assessment completed upon patients arrival to unit and care assumed.

## 2021-01-30 NOTE — ROUTINE PROCESS
Patient came on floor without O2. Patient has an order for 4 Liters. Patient's sat is 96 without 02.

## 2021-01-31 NOTE — ROUTINE PROCESS
Bedside shift change report given to Jesus Godwin LPN (oncoming nurse) by Serina Cash LPN (offgoing nurse). Report included the following information SBAR.

## 2021-01-31 NOTE — PROGRESS NOTES
Progress Note Date:1/31/2021       Room:[facility] Patient Brock Romero     YOB: 1940     Age:81 y.o. Subjective [de-identified]year old female from nursing home for poor appetite and intake for several days. Seen here on the 22nd for cough and fever and diagnosed with \"viral syndrome\". She returns now with increasing lethargy and food and water refusal.  Testing today reveals a + COVID test which has likely been present since she developed fever and shortness of breath on the 22nd. She does not answer any questions today and rolls back and forth and curls up in the fetal position to avoid the examiner. There were evidently problems with the copy machine at the facility so no papers were sent. She is reportedly a full code and the nurses had obtained her usual medications via phone for me and are placing them in the computer. There was no reported vomiting or diarrhea. Labs here revealed that she was dehydrated with BAYRON. Fortunately her oxygen saturation was greater than 95% on room air and she has no work of breathing noted. She was admitted for fluid replacement. 
  
 
 
Patient seen and evaluated. Resting in bed. Sill confused. Nonverbal.  
Has good O2 saturation and has been tapered down to room air. Afebrile Review of Systems Unable to perform ROS: Dementia Objective Vitals Last 24 Hours: 
Patient Vitals for the past 24 hrs: 
 Temp Pulse Resp BP SpO2  
01/31/21 1209 97.4 °F (36.3 °C) (!) 48 20 130/60 97 % 01/31/21 0738 98.4 °F (36.9 °C) 67 20 (!) 108/51 90 % 01/31/21 0547 97.2 °F (36.2 °C) 69 20 (!) 123/58 97 % 01/31/21 0030 97.5 °F (36.4 °C) 60 18  91 % 01/31/21 0026 97.5 °F (36.4 °C) (!) 101 20 (!) 118/47 94 % I/O (24Hr): Intake/Output Summary (Last 24 hours) at 1/31/2021 1706 Last data filed at 1/31/2021 1444 Gross per 24 hour Intake 685 ml Output 2 ml Net 683 ml Physical Exam: 
General: Alert, cooperative, no distress, appears stated age. Head:  Normocephalic, without obvious abnormality, atraumatic. Eyes:  Conjunctivae/corneas clear. Pupils equal, round, reactive to light. Extraocular movements intact. Lungs:  Clear to auscultation bilaterally. no wheeze, rales, crackles, rhonchi Chest wall: No tenderness or deformity. Heart:  Regular rate and rhythm, S1, S2 normal, no murmur, click, rub or gallop. Abdomen:  Soft, non-tender. Bowel sounds normal. No masses,  No organomegaly. Extremities: Extremities normal, atraumatic, no cyanosis or edema. Pulses: 2+ and symmetric all extremities. Skin: Skin color, texture, turgor normal. No rashes or lesions Neurologic: Awake, Alert, oriented. No obvious gross sensory or motor deficits Medications Current Facility-Administered Medications Medication Dose Route Frequency  atorvastatin (LIPITOR) tablet 20 mg  20 mg Oral DAILY  tamsulosin (FLOMAX) capsule 0.4 mg  0.4 mg Oral DAILY  methylPREDNISolone (PF) (SOLU-MEDROL) injection 40 mg  40 mg IntraVENous Q12H  
 metoprolol tartrate (LOPRESSOR) tablet 50 mg  50 mg Oral Q12H  pantoprazole (PROTONIX) 40 mg in 0.9% sodium chloride 10 mL injection  40 mg IntraVENous Q12H  
 sodium chloride (NS) flush 5-40 mL  5-40 mL IntraVENous Q8H  
 sodium chloride (NS) flush 5-40 mL  5-40 mL IntraVENous PRN  
 azithromycin (ZITHROMAX) 500 mg in 0.9% sodium chloride 250 mL (VIAL-MATE)  500 mg IntraVENous Q24H  
 acetaminophen (TYLENOL) tablet 650 mg  650 mg Oral Q6H PRN Or  
 acetaminophen (TYLENOL) suppository 650 mg  650 mg Rectal Q6H PRN  
 guaiFENesin-dextromethorphan (ROBITUSSIN DM) 100-10 mg/5 mL syrup 5 mL  5 mL Oral Q4H PRN  cholecalciferol (VITAMIN D3) (1000 Units /25 mcg) tablet 2,000 Units  2,000 Units Oral DAILY  zinc sulfate (ZINCATE) 220 (50) mg capsule 1 Cap  1 Cap Oral DAILY  famotidine (PEPCID) tablet 20 mg  20 mg Oral BID  
 0.45% sodium chloride infusion  125 mL/hr IntraVENous CONTINUOUS  
 haloperidol lactate (HALDOL) injection 2 mg  2 mg IntraVENous Q6H PRN  
 LORazepam (ATIVAN) tablet 1 mg  1 mg Oral Q6H PRN  
 [Held by provider] heparin (porcine) injection 5,000 Units  5,000 Units SubCUTAneous Q8H Allergies Codeine, Ibuprofen, and Morphine Labs/Imaging/Diagnostics Labs: 
Recent Results (from the past 48 hour(s)) HGB & HCT Collection Time: 01/29/21  8:10 PM  
Result Value Ref Range HGB 14.8 13.5 - 17.5 g/dL HCT 44.2 36 - 46 % BUN Collection Time: 01/29/21  8:30 PM  
Result Value Ref Range BUN 36 (H) 6 - 20 mg/dL METABOLIC PANEL, BASIC Collection Time: 01/30/21  6:45 AM  
Result Value Ref Range Sodium 149 (H) 136 - 145 mmol/L Potassium 4.0 3.5 - 5.1 mmol/L Chloride 111 (H) 97 - 108 mmol/L  
 CO2 24 21 - 32 mmol/L Anion gap 14 5 - 15 mmol/L Glucose 104 (H) 65 - 100 mg/dL BUN 30 (H) 6 - 20 mg/dL Creatinine 0.84 0.55 - 1.02 mg/dL BUN/Creatinine ratio 36 (H) 12 - 20 GFR est AA >60 >60 ml/min/1.73m2 GFR est non-AA >60 >60 ml/min/1.73m2 Calcium 9.1 8.5 - 10.1 mg/dL HGB & HCT Collection Time: 01/30/21  6:45 AM  
Result Value Ref Range HGB 15.4 13.5 - 17.5 g/dL HCT 46.9 (H) 36 - 46 % OCCULT BLOOD, STOOL Collection Time: 01/30/21  2:30 PM  
Result Value Ref Range Occult Blood,day 1 Positive HGB & HCT Collection Time: 01/30/21 11:50 PM  
Result Value Ref Range HGB 11.8 (L) 13.5 - 17.5 g/dL HCT 35.7 (L) 36 - 46 % HGB & HCT Collection Time: 01/31/21  6:18 AM  
Result Value Ref Range HGB 12.9 (L) 13.5 - 17.5 g/dL HCT 39.2 36 - 46 % CBC WITH AUTOMATED DIFF Collection Time: 01/31/21  3:20 PM  
Result Value Ref Range WBC 9.0 4.4 - 11.3 K/uL  
 RBC 4.30 (L) 4.50 - 5.90 M/uL  
 HGB 12.7 (L) 13.5 - 17.5 g/dL HCT 37.7 36 - 46 % MCV 87.6 80 - 100 FL  
 MCH 29.5 (L) 31 - 34 PG  
 MCHC 33.6 31.0 - 36.0 g/dL  
 RDW 17.5 (H) 11.5 - 14.5 % PLATELET 680 K/uL  MPV 10.3 6.5 - 11.5 FL  
 NRBC 0.0  WBC ABSOLUTE NRBC 0.00 K/uL NEUTROPHILS 92 (H) 42 - 75 % LYMPHOCYTES 5 (L) 20.5 - 51.1 % MONOCYTES 3 1.7 - 9.3 % EOSINOPHILS 0 (L) 0.9 - 2.9 % BASOPHILS 0 0.0 - 2.5 % ABS. NEUTROPHILS 8.3 (H) 1.8 - 7.7 K/UL  
 ABS. LYMPHOCYTES 0.4 (L) 1.0 - 4.8 K/UL  
 ABS. MONOCYTES 0.3 0.2 - 2.4 K/UL  
 ABS. EOSINOPHILS 0.0 0.0 - 0.7 K/UL  
 ABS. BASOPHILS 0.0 0.0 - 0.2 K/UL Imaging: Xr Chest [facility] Result Date: 1/27/2021 The heart size is within normal limits. No evidence of focal airspace consolidation. No evidence of pulmonary edema. No pneumothorax. Atherosclerotic arterial calcification. Assessment//Plan Problem List: 
Hospital Problems  Never Reviewed Codes Class Noted POA * (Principal) COVID-19 ICD-10-CM: U07.1 ICD-9-CM: 079.89  1/28/2021 Yes Dehydration ICD-10-CM: E86.0 ICD-9-CM: 276.51  1/28/2021 Yes Hypernatremia ICD-10-CM: E87.0 ICD-9-CM: 276.0  1/28/2021 Yes Failure to thrive in adult ICD-10-CM: R62.7 ICD-9-CM: 783.7  1/28/2021 Yes Essential hypertension ICD-10-CM: I10 
ICD-9-CM: 401.9  1/28/2021 Yes Hyperlipidemia ICD-10-CM: E78.5 ICD-9-CM: 272.4  1/28/2021 Yes COPD exacerbation 
-Patient is currently not hypoxic 
-Continue steroid and breathing treatment as needed 
  
Abnormal D-dimer 
-Elevated D-dimer of 16.93 likely from COVID-19 infection 
-Patient is not hypoxic and therefore will hold off on CTA of the chest 
-Anticoagulation per Covid protocol initially but patient had bloody bowel movement and noted that occult blood is positive so discontinued heparin drip 
  
COVID-19 infection 
-Patient with no acute finding on chest x-ray 
-Rapid Covid test on 1/27 was positive and PCR testing also positive 
-Not hypoxic at current so no remdesevir or steroids.   
-C-reactive protein on downward trend 
-Procalcitonin normal range 
  
Hypernatremia 
-Initially 153 on admission has been on downward trend 
-Continue current IV fluid 
-Monitor daily labs 
  
Elevated creatinine 
-Continue current IV fluid 
-Monitor daily renal function 
-1/30: Resolved 
  
Altered mental status 
-Unclear baseline mental status 
-Could be metabolic encephalopathy due to renal insufficiency and electrolyte imbalance Bloody bowel movement 
-Noted on 1/29 so heparin drip was discontinued 
-Monitor with serial H&H's and stable hemoglobin of 15.4 
-Occult blood noted to be positive 
-Monitor for recurrent episodes and monitor H&H 
-GI consultation Hypertension 
-Continue metoprolol 
-Every 4 hours Dyslipidemia 
-Continue statin GI prophylaxis -IV Protonix twice daily DVT prophylaxis 
-Contraindicated secondary to bloody bowel movement Full Code Spent 30 minutes evaluting and coordinating patient care of which >50% was spent coordinating and counseling.   
 
 
Electronically signed by Dandre Escobar MD on 1/31/2021 at 6:50 PM

## 2021-01-31 NOTE — PROGRESS NOTES
Problem: Falls - Risk of 
Goal: *Absence of Falls Description: Document Lily Adams Fall Risk and appropriate interventions in the flowsheet. Outcome: Progressing Towards Goal 
Note: Fall Risk Interventions: 
  
 
  
 
  
 
  
 
  
 
 
  
Problem: Patient Education: Go to Patient Education Activity Goal: Patient/Family Education Outcome: Progressing Towards Goal 
  
Problem: Pressure Injury - Risk of 
Goal: *Prevention of pressure injury Description: Document Marcelino Scale and appropriate interventions in the flowsheet. Outcome: Progressing Towards Goal 
Note: Pressure Injury Interventions: 
  
 
  
 
  
 
  
 
  
 
  
 
  
 
 
 
  
Problem: Patient Education: Go to Patient Education Activity Goal: Patient/Family Education Outcome: Progressing Towards Goal

## 2021-01-31 NOTE — ROUTINE PROCESS
Bedside shift change report given to ham don (oncoming nurse) by Waqar Bliss (offgoing nurse). Report included the following information SBAR.

## 2021-02-01 NOTE — ROUTINE PROCESS
Bedside shift change report given to Vikki Albright LPN (oncoming nurse) by Isak Ryan LPN (offgoing nurse). Report included the following information SBAR.

## 2021-02-01 NOTE — PROGRESS NOTES
Progress Note Date:2/1/2021       Room:220/ Patient Keyshawn Draft     YOB: 1940     Age:81 y.o. Subjective [de-identified]year old female from nursing home for poor appetite and intake for several days. Seen here on the 22nd for cough and fever and diagnosed with \"viral syndrome\". She returns now with increasing lethargy and food and water refusal.  Testing today reveals a + COVID test which has likely been present since she developed fever and shortness of breath on the 22nd. She does not answer any questions today and rolls back and forth and curls up in the fetal position to avoid the examiner. There were evidently problems with the copy machine at the facility so no papers were sent. She is reportedly a full code and the nurses had obtained her usual medications via phone for me and are placing them in the computer. There was no reported vomiting or diarrhea. Labs here revealed that she was dehydrated with BAYRON. Fortunately her oxygen saturation was greater than 95% on room air and she has no work of breathing noted. She was admitted for fluid replacement. 
  
 
 
Patient seen and evaluated. Resting in bed appears to count to be sleeping nurse contacted nursing home and no other contact other than the neighbor is noted. And patient baseline is similar to how patient currently is. Review of Systems Unable to perform ROS: Dementia Objective Vitals Last 24 Hours: 
Patient Vitals for the past 24 hrs: 
 Temp Pulse Resp BP SpO2  
02/01/21 1546 96.8 °F (36 °C) (!) 45 20 (!) 132/50 98 % 02/01/21 1135  (!) 39  (!) 146/72   
02/01/21 0702 98 °F (36.7 °C) (!) 45 20 (!) 141/48 94 % 02/01/21 0332 97.5 °F (36.4 °C) (!) 47 20 (!) 118/49 95 % 02/01/21 0000 97.6 °F (36.4 °C) (!) 47 20 110/62 100 % 01/31/21 2045 97.3 °F (36.3 °C) 94 20 117/83 97 % 01/31/21 1732 97.4 °F (36.3 °C) (!) 47 20 (!) 111/42 97 % I/O (24Hr):  
No intake or output data in the 24 hours ending 02/01/21 1724 Physical Exam: 
General: Alert, cooperative, no distress, appears stated age. Head:  Normocephalic, without obvious abnormality, atraumatic. Eyes:  Conjunctivae/corneas clear. Pupils equal, round, reactive to light. Extraocular movements intact. Lungs:  Clear to auscultation bilaterally. no wheeze, rales, crackles, rhonchi Chest wall: No tenderness or deformity. Heart:  Regular rate and rhythm, S1, S2 normal, no murmur, click, rub or gallop. Abdomen:  Soft, non-tender. Bowel sounds normal. No masses,  No organomegaly. Extremities: Extremities normal, atraumatic, no cyanosis or edema. Pulses: 2+ and symmetric all extremities. Skin: Skin color, texture, turgor normal. No rashes or lesions Neurologic: Awake, Alert, oriented. No obvious gross sensory or motor deficits Medications Current Facility-Administered Medications Medication Dose Route Frequency  cholecalciferol (VITAMIN D3) capsule 2,000 Units  2,000 Units Oral DAILY  atorvastatin (LIPITOR) tablet 20 mg  20 mg Oral DAILY  tamsulosin (FLOMAX) capsule 0.4 mg  0.4 mg Oral DAILY  methylPREDNISolone (PF) (SOLU-MEDROL) injection 40 mg  40 mg IntraVENous Q12H  pantoprazole (PROTONIX) 40 mg in 0.9% sodium chloride 10 mL injection  40 mg IntraVENous Q12H  
 sodium chloride (NS) flush 5-40 mL  5-40 mL IntraVENous Q8H  
 sodium chloride (NS) flush 5-40 mL  5-40 mL IntraVENous PRN  
 azithromycin (ZITHROMAX) 500 mg in 0.9% sodium chloride 250 mL (VIAL-MATE)  500 mg IntraVENous Q24H  
 acetaminophen (TYLENOL) tablet 650 mg  650 mg Oral Q6H PRN Or  
 acetaminophen (TYLENOL) suppository 650 mg  650 mg Rectal Q6H PRN  
 guaiFENesin-dextromethorphan (ROBITUSSIN DM) 100-10 mg/5 mL syrup 5 mL  5 mL Oral Q4H PRN  zinc sulfate (ZINCATE) 220 (50) mg capsule 1 Cap  1 Cap Oral DAILY  famotidine (PEPCID) tablet 20 mg  20 mg Oral BID  
 0.45% sodium chloride infusion  125 mL/hr IntraVENous CONTINUOUS  
 haloperidol lactate (HALDOL) injection 2 mg  2 mg IntraVENous Q6H PRN  
 LORazepam (ATIVAN) tablet 1 mg  1 mg Oral Q6H PRN  
 [Held by provider] heparin (porcine) injection 5,000 Units  5,000 Units SubCUTAneous Q8H Allergies Codeine, Ibuprofen, and Morphine Labs/Imaging/Diagnostics Labs: 
Recent Results (from the past 48 hour(s)) HGB & HCT Collection Time: 01/30/21 11:50 PM  
Result Value Ref Range HGB 11.8 (L) 13.5 - 17.5 g/dL HCT 35.7 (L) 36 - 46 % HGB & HCT Collection Time: 01/31/21  6:18 AM  
Result Value Ref Range HGB 12.9 (L) 13.5 - 17.5 g/dL HCT 39.2 36 - 46 % CBC WITH AUTOMATED DIFF Collection Time: 01/31/21  3:20 PM  
Result Value Ref Range WBC 9.0 4.4 - 11.3 K/uL  
 RBC 4.30 (L) 4.50 - 5.90 M/uL  
 HGB 12.7 (L) 13.5 - 17.5 g/dL HCT 37.7 36 - 46 % MCV 87.6 80 - 100 FL  
 MCH 29.5 (L) 31 - 34 PG  
 MCHC 33.6 31.0 - 36.0 g/dL  
 RDW 17.5 (H) 11.5 - 14.5 % PLATELET 409 K/uL MPV 10.3 6.5 - 11.5 FL  
 NRBC 0.0  WBC ABSOLUTE NRBC 0.00 K/uL NEUTROPHILS 92 (H) 42 - 75 % LYMPHOCYTES 5 (L) 20.5 - 51.1 % MONOCYTES 3 1.7 - 9.3 % EOSINOPHILS 0 (L) 0.9 - 2.9 % BASOPHILS 0 0.0 - 2.5 % ABS. NEUTROPHILS 8.3 (H) 1.8 - 7.7 K/UL  
 ABS. LYMPHOCYTES 0.4 (L) 1.0 - 4.8 K/UL  
 ABS. MONOCYTES 0.3 0.2 - 2.4 K/UL  
 ABS. EOSINOPHILS 0.0 0.0 - 0.7 K/UL  
 ABS. BASOPHILS 0.0 0.0 - 0.2 K/UL  
BUN Collection Time: 02/01/21 12:50 AM  
Result Value Ref Range BUN 20 6 - 20 mg/dL CBC WITH AUTOMATED DIFF Collection Time: 02/01/21  5:47 AM  
Result Value Ref Range WBC 9.1 4.4 - 11.3 K/uL  
 RBC 4.40 (L) 4.50 - 5.90 M/uL  
 HGB 12.9 (L) 13.5 - 17.5 g/dL HCT 38.6 36 - 46 % MCV 87.7 80 - 100 FL  
 MCH 29.3 (L) 31 - 34 PG  
 MCHC 33.4 31.0 - 36.0 g/dL  
 RDW 17.4 (H) 11.5 - 14.5 % PLATELET 88 K/uL MPV 9.4 6.5 - 11.5 FL  
 NRBC 0.1  WBC ABSOLUTE NRBC 0.01 K/uL NEUTROPHILS 91 (H) 42 - 75 %  LYMPHOCYTES 6 (L) 20.5 - 51.1 % MONOCYTES 3 1.7 - 9.3 % EOSINOPHILS 0 (L) 0.9 - 2.9 % BASOPHILS 0 0.0 - 2.5 % ABS. NEUTROPHILS 8.3 (H) 1.8 - 7.7 K/UL  
 ABS. LYMPHOCYTES 0.5 (L) 1.0 - 4.8 K/UL  
 ABS. MONOCYTES 0.3 0.2 - 2.4 K/UL  
 ABS. EOSINOPHILS 0.0 0.0 - 0.7 K/UL  
 ABS. BASOPHILS 0.0 0.0 - 0.2 K/UL Imaging: Xr Chest [facility] Result Date: 1/27/2021 The heart size is within normal limits. No evidence of focal airspace consolidation. No evidence of pulmonary edema. No pneumothorax. Atherosclerotic arterial calcification. Assessment//Plan Problem List: 
Hospital Problems  Never Reviewed Codes Class Noted POA * (Principal) COVID-19 ICD-10-CM: U07.1 ICD-9-CM: 079.89  1/28/2021 Yes Dehydration ICD-10-CM: E86.0 ICD-9-CM: 276.51  1/28/2021 Yes Hypernatremia ICD-10-CM: E87.0 ICD-9-CM: 276.0  1/28/2021 Yes Failure to thrive in adult ICD-10-CM: R62.7 ICD-9-CM: 783.7  1/28/2021 Yes Essential hypertension ICD-10-CM: I10 
ICD-9-CM: 401.9  1/28/2021 Yes Hyperlipidemia ICD-10-CM: E78.5 ICD-9-CM: 272.4  1/28/2021 Yes COPD exacerbation 
-Patient is currently not hypoxic 
-Continue steroid and breathing treatment as needed 
  
Abnormal D-dimer 
-Elevated D-dimer of 16.93 likely from COVID-19 infection 
-Patient is not hypoxic and therefore will hold off on CTA of the chest 
-Anticoagulation per Covid protocol initially but patient had bloody bowel movement and noted that occult blood is positive so discontinued heparin drip 
-D-dimer 1 initially increased to 35.1 but then decreased to 24.66 on 1/29 
  
COVID-19 infection 
-Patient with no acute finding on chest x-ray 
-Rapid Covid test on 1/27 was positive and PCR testing also positive 
-Not hypoxic at current so no remdesevir or steroids.   
-C-reactive protein on downward trend 
-Procalcitonin normal range 
  
Hypernatremia 
-Initially 153 on admission has been on downward trend 
-Continue current IV fluid 
-Monitor daily labs 
  
Elevated creatinine 
-Continue current IV fluid 
-Monitor daily renal function 
-1/30: Resolved 
  
Altered mental status 
-Unclear baseline mental status 
-Could be metabolic encephalopathy due to renal insufficiency and electrolyte imbalance Bloody bowel movement 
-Noted on 1/29 so heparin drip was discontinued 
-Monitor with serial H&H's and stable hemoglobin of 15.4 
-Occult blood noted to be positive 
-Monitor for recurrent episodes and monitor H&H 
-GI consultation Hypertension 
-Continue metoprolol 
-Every 4 hours Dyslipidemia 
-Continue statin GI prophylaxis -IV Protonix twice daily DVT prophylaxis 
-Contraindicated secondary to bloody bowel movement Full Code Spent 30 minutes evaluting and coordinating patient care of which >50% was spent coordinating and counseling.   
 
 
Electronically signed by Dandre Escobar MD on 2/1/2021 at 6:50 PM

## 2021-02-01 NOTE — PROGRESS NOTES
Problem: Falls - Risk of 
Goal: *Absence of Falls Description: Document Samuel Connor Fall Risk and appropriate interventions in the flowsheet. Outcome: Progressing Towards Goal 
Note: Fall Risk Interventions: 
  
 
  
 
  
 
  
 
  
 
 
  
Problem: Patient Education: Go to Patient Education Activity Goal: Patient/Family Education Outcome: Progressing Towards Goal 
  
Problem: Pressure Injury - Risk of 
Goal: *Prevention of pressure injury Description: Document Marcelino Scale and appropriate interventions in the flowsheet. Outcome: Progressing Towards Goal 
Note: Pressure Injury Interventions: 
  
 
  
 
  
 
  
 
  
 
  
 
  
 
 
 
  
Problem: Patient Education: Go to Patient Education Activity Goal: Patient/Family Education Outcome: Progressing Towards Goal

## 2021-02-02 NOTE — PROGRESS NOTES
SPEECH LANGUAGE PATHOLOGY BEDSIDE SWALLOW EVALUATION AND DISCHARGE Patient: Guy Almaraz (62 y.o. female) Date: 2/2/2021 Primary Diagnosis: COVID-19 [U07.1] Precautions: aspiration, fall, droplet plus ASSESSMENT : 
Clinical beside swallow eval completed per MD orders. Patient resting in bed, easily roused to name. Patient curled into fetal position on her right side and difficult to reposition. Patient HOB raised and pillow placed under right shoulder to approximate upright positioning as much as possible. Patient with difficulty following commands so full oral mech difficult to assess. Patient with reluctance/refusal behaviors when proffered food and drink. Verbal encouragement yielded x2 spoonfuls of soft solids and x3 straw sips of water, but patient adamantly refused additional trials. No overt s/s suggestive of aspiration noted with any consistencies presented, but patient with limited PO intake. When checking patient mouth for pocketing, lingual residue noted, cleared with liquid wash. Patient with significant amount of white patches covering tongue surface. Poor PO intake likely a result of oral discomfort. Thank you for this referral.  
Armando Talavera, SLP  
 
PLAN : 
Recommendations and Planned Interventions: 
No formal ST needs ID'd for dysphagia. Eval only. Discharge Recommendations: To Be Determined SUBJECTIVE:  
Patient stated No, I don't think so\" when asked if would like her midday meal 
 
OBJECTIVE:  
 
Past Medical History:  
Diagnosis Date  COPD (chronic obstructive pulmonary disease) (HCC)  COVID-19 No past surgical history on file. Home Situation:  
Home Situation Home Environment: Skilled nursing facility Care Facility Name: [facility] # Steps to Enter: 0 One/Two Story Residence: One story Living Alone: No 
Support Systems: Skilled nursing facility Patient Expects to be Discharged to[de-identified] Skilled nursing facility Current DME Used/Available at Home: Other (comment)(Long term care facility) Diet prior to admission: Unknown Current Diet:  Mechanical soft Cognitive and Communication Status: 
Neurologic State: Sleeping, Agitated Orientation Level: Disoriented X4 Cognition: Decreased command following Oral Assessment: 
Oral Assessment Labial: No impairment Dentition: Limited;Poor Oral Hygiene: Poor Lingual: (White, patchy thrush) Velum: Unable to visualize Mandible: No impairment P.O. Trials: 
Patient Position: Lying in bed, HOB repositionied upright Vocal quality prior to P.O.: No impairment Consistency Presented: Thin liquid;Mechanical soft How Presented: Spoon;Straw;Successive swallows;SLP-fed/presented Bolus Acceptance: No impairment Propulsion: Delayed (# of seconds) Oral Residue: 10-50% of bolus Initiation of Swallow: No impairment Aspiration Signs/Symptoms: None Pharyngeal Phase Characteristics: No impairment, issues, or problems Effective Modifications: (Verbal cues to swallow) Cues for Modifications: Minimal 
  
 
  
  
 
PAIN: 
Patient did not exhibit s/s suggestive of pain at rest or with repositioning After evaluation:  
[]            Patient left in no apparent distress sitting up in chair 
[x]            Patient left in no apparent distress in bed 
[x]            Call bell left within reach [x]            Nursing notified []            Family present 
[]            Caregiver present 
[]            Bed alarm activated COMMUNICATION/EDUCATION:  
[x]            Aspiration precautions; swallow safety; compensatory techniques. []            Patient/family have participated as able in goal setting and plan of care. []            Patient/family agree to work toward stated goals and plan of care. []            Patient understands intent and goals of therapy; neutral about participation.  
[x]            Patient unable to participate in goal setting/plan of care; educ ongoing with interdisciplinary staff 
[]         Posted safety precautions in patient's room. The patient's plan of care including recommendations, planned interventions, and recommended diet changes were discussed with: Registered nurse, PASHA. Thank you for this referral. 
KAITLIN Cruz Time Calculation: 20 mins

## 2021-02-02 NOTE — ROUTINE PROCESS
Bedside shift change report given to ham don (oncoming nurse) by Mariely Santamaria (offgoing nurse). Report included the following information Kardex.

## 2021-02-02 NOTE — PROGRESS NOTES
Comprehensive Nutrition Assessment Type and Reason for Visit: Positive nutrition screen, Initial 
 
Nutrition Recommendations/Plan:  
Continue w/ current cardiac, soft solid foods Ensure x3/day Request a speech eval  
Request a pre-alb & total protein lab value Obtain an ABW to better assess patient's current nutrition status Monitor & Record daily wts, po & supplement intakes Will continue to follow as high level nutrition care Nutrition Assessment:  [de-identified]year old female from nursing home for poor appetite and intake for several days. Covid +. Noted poor intake >25% @ meals per pt's RN. Stated that she does drink the liquids on her meal tray. loss of appetite most likely r/t covid 19. Nutrition related lab values: hbg (12.5), glucose (105), mg (1.5), mg (1.5), Alb (2.4), Ca (7.7), Corrected Ca ( 8.98). Meds: heparin, lipitor. Nutrition Supplements: vitamin D (2,000 units), Zinc (50mg). Malnutrition Assessment: 
Malnutrition Status:  Mild malnutrition(unable to perform NFPE due to precautions of covid 19) Context:  Acute illness Findings of the 6 clinical characteristics of malnutrition:  
Energy Intake:  7 - 50% or less of est energy requirements for 5 or more days Weight Loss:  Unable to assess Body Fat Loss:  Unable to assess, Muscle Mass Loss:  Unable to assess, Fluid Accumulation:  Unable to assess,   
 Strength:  Not performed Estimated Daily Nutrient Needs: 
Energy (kcal): 1,300kcals (1,053. 75BMR x 1.2); Weight Used for Energy Requirements: Current Protein (g): 56-67g (1.0-1.2g/kg); Weight Used for Protein Requirements: Current Fluid (ml/day): 1,300ml; Method Used for Fluid Requirements: 1 ml/kcal 
 
 
Nutrition Related Findings:  No reported n/v, c/d, ??? oral dysphagia r/t recent decline in po intake. Currently bedbound, unable to assess for muscle or fat wasting, & Skin: intacted dry. Wounds:   
None Current Nutrition Therapies: DIET CARDIAC Soft Solids DIET NUTRITIONAL SUPPLEMENTS All Meals; Ensure [employer] Anthropometric Measures: 
· Height:  5' 6\" (167.6 cm) · Current Body Wt:  56.7 kg (125 lb) -EST. WEIGHT · Ideal Body Wt:  130 lbs:  96.2 % · Adjusted Body Weight:   ; Weight Adjustment for: No adjustment · BMI Category:  Underweight (BMI less than 22) age over 72 Nutrition Diagnosis:  
· Inadequate protein-energy intake related to other (specify)(loss of appetite most likely r/t covid +) as evidenced by intake 0-25%, poor intake prior to admission Nutrition Interventions:  
Food and/or Nutrient Delivery: Continue current diet, Start oral nutrition supplement(ensure x3/day) Nutrition Education and Counseling: No recommendations at this time Coordination of Nutrition Care: Swallow evaluation, Speech therapy, Continue to monitor while inpatient, Interdisciplinary rounds Goals: 
>50% EENs x4days, Wt. Stability =/+kg x 7days Nutrition Monitoring and Evaluation:  
Behavioral-Environmental Outcomes: None identified Food/Nutrient Intake Outcomes: Food and nutrient intake, Supplement intake Physical Signs/Symptoms Outcomes: Biochemical data, Chewing or swallowing, Weight Discharge Planning:   
Continue oral nutrition supplement

## 2021-02-02 NOTE — PROGRESS NOTES
Spiritual Care Assessment/Progress Note [address] 
 
 
NAME: Leda Rodriguez      MRN: [id number] AGE: [de-identified] y.o. SEX: female Shinto Affiliation: Unknown Language: English  
 
2/2/2021     Total Time (in minutes): 13 Spiritual Assessment begun in [address] through conversation with: 
  
    []Patient        [x] Family    [] Friend(s) Reason for Consult: Initial/Spiritual assessment, patient floor Spiritual beliefs: (Please include comment if needed) [x] Identifies with a valeria tradition:     
   [] Supported by a valeria community:        
   [] Claims no spiritual orientation:       
   [] Seeking spiritual identity:            
   [] Adheres to an individual form of spirituality:       
   [] Not able to assess:                   
 
    
Identified resources for coping:  
   [x] Prayer                           
   [] Music                  [] Guided Imagery [x] Family/friends                 [] Pet visits [] Devotional reading                         [] Unknown 
   [] Other:                                          
 
 
Interventions offered during this visit: (See comments for more details) Patient Interventions: Other (comment)(Silent support and prayer) Family/Friend(s): Affirmation of emotions/emotional suffering, Affirmation of valeria, Bridging, Coping skills reviewed/reinforced, Iconic (affirming the presence of God/Higher Power), Initial Assessment, Life review/legacy, Normalization of emotional/spiritual concerns, Prayer (assurance of) Plan of Care: 
 
 [] Support spiritual and/or cultural needs  
 [] Support AMD and/or advance care planning process    
 [] Support grieving process 
 [] Coordinate Rites and/or Rituals  
 [] Coordination with community clergy  [] No spiritual needs identified at this time 
 [] Detailed Plan of Care below (See Comments)  [] Make referral to Music Therapy 
[] Make referral to Pet Therapy [] Make referral to Addiction services 
[] Make referral to [facility] 
[] Make referral to Spiritual Care Partner 
[] No future visits requested       
[x] Follow up upon further referrals Comments:  Visit attempted for patient in 2 acute care [facility] Rustam Allen during rounds. Per current COVID-19 isolation protocol in effect, I provided silent support and prayer outside patient room. I called patient room but Ms. Granda did not answer. I called her sister Macie Grandbhavana and provided spiritual support. She shared about family situations and recent medical conditions as well as family history of patient. Ms. Macie Baldwin seemed to find strength in her valeria in God along with prayer. I listened with empathy and reflection while facilitating storytelling. I affirmed her valeria in God and support system she has with her family. I assured of prayer for her sister and her family, and advised of  availability. Chaplains will follow up if further referrals are requested. Chaplain Karla Bland M.Div.  can be reached by calling the  at [facility] 
[phone]

## 2021-02-03 NOTE — PROGRESS NOTES
Progress Note [facility]CV[facility]:4/0/3096       CBVP:016/31 Patient Edmond Flowers     YOB: 1940     Age:81 y.o. Subjective [de-identified]year old female from nursing home for poor appetite and intake for several days. Seen here on the 22nd for cough and fever and diagnosed with \"viral syndrome\". She returns now with increasing lethargy and food and water refusal.  Testing today reveals a + COVID test which has likely been present since she developed fever and shortness of breath on the 22nd. She does not answer any questions today and rolls back and forth and curls up in the fetal position to avoid the examiner. There were evidently problems with the copy machine at the facility so no papers were sent. She is reportedly a full code and the nurses had obtained her usual medications via phone for me and are placing them in the computer. There was no reported vomiting or diarrhea. Labs here revealed that she was dehydrated with BAYRON. Fortunately her oxygen saturation was greater than 95% on room air and she has no work of breathing noted. She was admitted for fluid replacement. 
  
 
 
Patient seen and evaluated. Resting in bed. Patient wants to be left alone. Discussed with patient sister and she did not wish to make decsion about DNR. Also didn't want to tell me if patient wishes to be comfort. Potassium and mag still low Review of Systems Unable to perform ROS: Dementia Objective Vitals Last 24 Hours: 
Patient Vitals for the past 24 hrs: 
 Temp Pulse Resp BP SpO2  
02/03/21 1537 97 °F (36.1 °C) 70 20 114/60 95 % 02/03/21 1110 (!) 96.4 °F (35.8 °C) 60 20 (!) 128/47 96 % 02/03/21 0714 97.4 °F (36.3 °C) 61 20 (!) 87/52   
02/03/21 0508 (!) 96.7 °F (35.9 °C) (!) 48 20 (!) 115/52 97 % 02/02/21 2258 97 °F (36.1 °C) 70 20 (!) 136/45 94 % 02/02/21 1903 97.3 °F (36.3 °C) (!) 45 18 (!) 114/42 94 % I/O (24Hr): Intake/Output Summary (Last 24 hours) at 2/3/2021 1727 Last data filed at 2/3/2021 8200 Gross per 24 hour Intake 0 ml Output 1 ml Net -1 ml Physical Exam: 
General: Alert, cooperative, no distress, appears stated age. Head:  Normocephalic, without obvious abnormality, atraumatic. Eyes:  Conjunctivae/corneas clear. Pupils equal, round, reactive to light. Extraocular movements intact. Lungs:  Clear to auscultation bilaterally. no wheeze, rales, crackles, rhonchi Chest wall: No tenderness or deformity. Heart:  Regular rate and rhythm, S1, S2 normal, no murmur, click, rub or gallop. Abdomen:  Soft, non-tender. Bowel sounds normal. No masses,  No organomegaly. Extremities: Extremities normal, atraumatic, no cyanosis or edema. Pulses: 2+ and symmetric all extremities. Skin: Skin color, texture, turgor normal. No rashes or lesions Neurologic: Awake, Alert, oriented. No obvious gross sensory or motor deficits Medications Current Facility-Administered Medications Medication Dose Route Frequency  pantoprazole (PROTONIX) tablet 40 mg  40 mg Oral ACB  magnesium oxide (MAG-OX) tablet 400 mg  400 mg Oral BID  nystatin (MYCOSTATIN) 100,000 unit/mL oral suspension 500,000 Units  500,000 Units Oral QID  potassium chloride (K-DUR, KLOR-CON) SR tablet 40 mEq  40 mEq Oral BID  cholecalciferol (VITAMIN D3) capsule 2,000 Units  2,000 Units Oral DAILY  atorvastatin (LIPITOR) tablet 20 mg  20 mg Oral DAILY  tamsulosin (FLOMAX) capsule 0.4 mg  0.4 mg Oral DAILY  sodium chloride (NS) flush 5-40 mL  5-40 mL IntraVENous Q8H  
 sodium chloride (NS) flush 5-40 mL  5-40 mL IntraVENous PRN  
 acetaminophen (TYLENOL) tablet 650 mg  650 mg Oral Q6H PRN Or  
 acetaminophen (TYLENOL) suppository 650 mg  650 mg Rectal Q6H PRN  
 guaiFENesin-dextromethorphan (ROBITUSSIN DM) 100-10 mg/5 mL syrup 5 mL  5 mL Oral Q4H PRN  zinc sulfate (ZINCATE) 220 (50) mg capsule 1 Cap  1 Cap Oral DAILY  haloperidol lactate (HALDOL) injection 2 mg  2 mg IntraVENous Q6H PRN  
 LORazepam (ATIVAN) tablet 1 mg  1 mg Oral Q6H PRN Allergies Codeine, Ibuprofen, and Morphine Labs/Imaging/Diagnostics Labs: 
Recent Results (from the past 48 hour(s)) CBC WITH AUTOMATED DIFF Collection Time: 02/02/21  5:37 AM  
Result Value Ref Range WBC 7.9 4.4 - 11.3 K/uL  
 RBC 4.28 (L) 4.50 - 5.90 M/uL  
 HGB 12.5 (L) 13.5 - 17.5 g/dL HCT 37.1 36 - 46 % MCV 86.5 80 - 100 FL  
 MCH 29.1 (L) 31 - 34 PG  
 MCHC 33.6 31.0 - 36.0 g/dL  
 RDW 17.5 (H) 11.5 - 14.5 % PLATELET 202 K/uL MPV 10.5 6.5 - 11.5 FL  
 NRBC 0.2  WBC ABSOLUTE NRBC 0.01 K/uL NEUTROPHILS 92 (H) 42 - 75 % LYMPHOCYTES 4 (L) 20.5 - 51.1 % MONOCYTES 4 1.7 - 9.3 % EOSINOPHILS 0 (L) 0.9 - 2.9 % BASOPHILS 0 0.0 - 2.5 % ABS. NEUTROPHILS 7.2 1.8 - 7.7 K/UL  
 ABS. LYMPHOCYTES 0.3 (L) 1.0 - 4.8 K/UL  
 ABS. MONOCYTES 0.3 0.2 - 2.4 K/UL  
 ABS. EOSINOPHILS 0.0 0.0 - 0.7 K/UL  
 ABS. BASOPHILS 0.0 0.0 - 0.2 K/UL METABOLIC PANEL, BASIC Collection Time: 02/02/21  5:37 AM  
Result Value Ref Range Sodium 145 136 - 145 mmol/L Potassium 2.6 (LL) 3.5 - 5.1 mmol/L Chloride 108 97 - 108 mmol/L  
 CO2 26 21 - 32 mmol/L Anion gap 11 5 - 15 mmol/L Glucose 105 (H) 65 - 100 mg/dL BUN 18 6 - 20 mg/dL Creatinine 0.87 0.55 - 1.02 mg/dL BUN/Creatinine ratio 21 (H) 12 - 20 GFR est AA >60 >60 ml/min/1.73m2 GFR est non-AA >60 >60 ml/min/1.73m2 Calcium 7.7 (L) 8.5 - 10.1 mg/dL MAGNESIUM Collection Time: 02/02/21  5:37 AM  
Result Value Ref Range Magnesium 1.5 (L) 1.6 - 2.4 mg/dL D DIMER Collection Time: 02/02/21  5:37 AM  
Result Value Ref Range D-dimer >35.20 (H) 0.19 - 0.50 mg/L FEU ALBUMIN Collection Time: 02/02/21  5:37 AM  
Result Value Ref Range Albumin 2.4 (L) 3.5 - 5.0 g/dL METABOLIC PANEL, BASIC Collection Time: 02/03/21  5:55 AM  
Result Value Ref Range Sodium 144 136 - 145 mmol/L  Potassium 2.7 (LL) 3.5 - 5.1 mmol/L Chloride 107 97 - 108 mmol/L  
 CO2 28 21 - 32 mmol/L Anion gap 9 5 - 15 mmol/L Glucose 71 65 - 100 mg/dL BUN 16 6 - 20 mg/dL Creatinine 0.86 0.55 - 1.02 mg/dL BUN/Creatinine ratio 19 12 - 20 GFR est AA >60 >60 ml/min/1.73m2 GFR est non-AA >60 >60 ml/min/1.73m2 Calcium 8.2 (L) 8.5 - 10.1 mg/dL D DIMER Collection Time: 02/03/21  5:55 AM  
Result Value Ref Range D-dimer 12.14 (H) 0.19 - 0.50 mg/L FEU MAGNESIUM Collection Time: 02/03/21  5:55 AM  
Result Value Ref Range Magnesium 1.6 1.6 - 2.4 mg/dL Imaging: Xr Chest [place] Result Date: 1/27/2021 The heart size is within normal limits. No evidence of focal airspace consolidation. No evidence of pulmonary edema. No pneumothorax. Atherosclerotic arterial calcification. Assessment//Plan Problem List: 
Hospital Problems  Never Reviewed Codes Class Noted POA * (Principal) COVID-19 ICD-10-CM: U07.1 ICD-9-CM: 079.89  1/28/2021 Yes Dehydration ICD-10-CM: E86.0 ICD-9-CM: 276.51  1/28/2021 Yes Hypernatremia ICD-10-CM: E87.0 ICD-9-CM: 276.0  1/28/2021 Yes Failure to thrive in adult ICD-10-CM: R62.7 ICD-9-CM: 783.7  1/28/2021 Yes Essential hypertension ICD-10-CM: I10 
ICD-9-CM: 401.9  1/28/2021 Yes Hyperlipidemia ICD-10-CM: E78.5 ICD-9-CM: 272.4  1/28/2021 Yes COPD exacerbation 
-Patient is currently not hypoxic 
-Continue steroid and breathing treatment as needed 
  
Abnormal D-dimer 
-Elevated D-dimer of 16.93 likely from COVID-19 infection 
-Patient is not hypoxic and therefore will hold off on CTA of the chest 
-Anticoagulation per Covid protocol initially but patient had bloody bowel movement and noted that occult blood is positive so discontinued heparin drip 
-D-dimer 1 initially increased to 35.1 but then decreased to 24.66 on 1/29 ut on repeat on 2/2 increased again to >35.2 
- patient had picc line in place and attempted to get CTA but patient pulled out line once again.  
  
COVID-19 infection 
-Patient with no acute finding on chest x-ray 
-Rapid Covid test on 1/27 was positive and PCR testing also positive 
-Not hypoxic at current so no remdesevir or steroids. -C-reactive protein on downward trend 
-Procalcitonin normal range 
  
Hypernatremia 
-Initially 153 on admission has been on downward trend with IV fluids and discontinued. -Monitor daily labs 
  
Elevated creatinine 
-Continue current IV fluid 
-Monitor daily renal function 
-1/30: Resolved Hypokalemia - was low on admission at 3.1 and resolved but then on 2/2 decreased to 2.6 from poor PO intake. - supplemented with IV riders but patient pulled out PICC line so will attempt po with 40meq kcl oral Bid  
- repeat K+ ob 2/3 is still low at 2.7. contnue current supplementation and additional dosing at noon 
  
Hypomagnesemia - was low at 1.5 and supplemented with 2gm IV rider prior to patient pulling out IV line but still low at 1.6 so continue oral supllkents. - follow up repeat mag level Altered mental status 
-Unclear baseline mental status 
-Could be metabolic encephalopathy due to renal insufficiency and electrolyte imbalance Bloody bowel movement 
-Noted on 1/29 so heparin drip was discontinued 
-Monitor with serial H&H's and stable hemoglobin of 15.4 
-Occult blood noted to be positive 
-Monitor for recurrent episodes and monitor H&H 
-GI consultation if h/h continues to drop Hypertension 
-Continue metoprolol 
-Every 4 hours Dyslipidemia 
-Continue statin GI prophylaxis -IV Protonix twice daily DVT prophylaxis 
-Contraindicated secondary to bloody bowel movement Full Code Will need to contact patient sister about goals of care and code status Spent 30 minutes evaluting and coordinating patient care of which >50% was spent coordinating and counseling.   
 
 
Electronically signed by Alis Javier MD on 2/3/2021 at 6:50 PM

## 2021-02-03 NOTE — PROGRESS NOTES
Progress Note Date:2/2/2021       Room:[facility] Patient Elizabeth Shea     YOB: 1940     Age:81 y.o. Subjective [de-identified]year old female from nursing home for poor appetite and intake for several days. Seen here on the 22nd for cough and fever and diagnosed with \"viral syndrome\". She returns now with increasing lethargy and food and water refusal.  Testing today reveals a + COVID test which has likely been present since she developed fever and shortness of breath on the 22nd. She does not answer any questions today and rolls back and forth and curls up in the fetal position to avoid the examiner. There were evidently problems with the copy machine at the facility so no papers were sent. She is reportedly a full code and the nurses had obtained her usual medications via phone for me and are placing them in the computer. There was no reported vomiting or diarrhea. Labs here revealed that she was dehydrated with BAYRON. Fortunately her oxygen saturation was greater than 95% on room air and she has no work of breathing noted. She was admitted for fluid replacement. 
  
 
 
Patient seen and evaluated. Resting in bed. Nurse notes that she took some of her meds today with encouragement. Oral care perfomed as noted oral candidasis. Electrolytes of K+ and mag were low. Review of Systems Unable to perform ROS: Dementia Objective Vitals Last 24 Hours: 
Patient Vitals for the past 24 hrs: 
 Temp Pulse Resp BP SpO2  
02/02/21 1903 97.3 °F (36.3 °C) (!) 45 18 (!) 114/42 94 % 02/02/21 1603 97.6 °F (36.4 °C) (!) 48 18 (!) 89/51 96 % 02/02/21 1132 97.6 °F (36.4 °C) (!) 53 18 (!) 113/46 96 % 02/02/21 0731 97.5 °F (36.4 °C) (!) 43 18 (!) 105/50 98 % 02/02/21 0450 (!) 96.1 °F (35.6 °C) (!) 43 18 91/64 98 % 02/02/21 0025 96.9 °F (36.1 °C) (!) 42 18 (!) 114/45 96 % I/O (24Hr): Intake/Output Summary (Last 24 hours) at 2/2/2021 1954 Last data filed at 2/2/2021 1811 Gross per 24 hour Intake 60 ml Output 1 ml Net 59 ml Physical Exam: 
General: Alert, cooperative, no distress, appears stated age. Head:  Normocephalic, without obvious abnormality, atraumatic. Eyes:  Conjunctivae/corneas clear. Pupils equal, round, reactive to light. Extraocular movements intact. Lungs:  Clear to auscultation bilaterally. no wheeze, rales, crackles, rhonchi Chest wall: No tenderness or deformity. Heart:  Regular rate and rhythm, S1, S2 normal, no murmur, click, rub or gallop. Abdomen:  Soft, non-tender. Bowel sounds normal. No masses,  No organomegaly. Extremities: Extremities normal, atraumatic, no cyanosis or edema. Pulses: 2+ and symmetric all extremities. Skin: Skin color, texture, turgor normal. No rashes or lesions Neurologic: Awake, Alert, oriented. No obvious gross sensory or motor deficits Medications Current Facility-Administered Medications Medication Dose Route Frequency  predniSONE (DELTASONE) tablet 20 mg  20 mg Oral DAILY WITH BREAKFAST  [START ON 2/3/2021] pantoprazole (PROTONIX) tablet 40 mg  40 mg Oral ACB  magnesium oxide (MAG-OX) tablet 400 mg  400 mg Oral BID  nystatin (MYCOSTATIN) 100,000 unit/mL oral suspension 500,000 Units  500,000 Units Oral QID  cholecalciferol (VITAMIN D3) capsule 2,000 Units  2,000 Units Oral DAILY  atorvastatin (LIPITOR) tablet 20 mg  20 mg Oral DAILY  tamsulosin (FLOMAX) capsule 0.4 mg  0.4 mg Oral DAILY  sodium chloride (NS) flush 5-40 mL  5-40 mL IntraVENous Q8H  
 sodium chloride (NS) flush 5-40 mL  5-40 mL IntraVENous PRN  
 acetaminophen (TYLENOL) tablet 650 mg  650 mg Oral Q6H PRN Or  
 acetaminophen (TYLENOL) suppository 650 mg  650 mg Rectal Q6H PRN  
 guaiFENesin-dextromethorphan (ROBITUSSIN DM) 100-10 mg/5 mL syrup 5 mL  5 mL Oral Q4H PRN  zinc sulfate (ZINCATE) 220 (50) mg capsule 1 Cap  1 Cap Oral DAILY  haloperidol lactate (HALDOL) injection 2 mg  2 mg IntraVENous Q6H PRN  
 LORazepam (ATIVAN) tablet 1 mg  1 mg Oral Q6H PRN  
 [Held by provider] heparin (porcine) injection 5,000 Units  5,000 Units SubCUTAneous Q8H Allergies Codeine, Ibuprofen, and Morphine Labs/Imaging/Diagnostics Labs: 
Recent Results (from the past 48 hour(s)) BUN Collection Time: 02/01/21 12:50 AM  
Result Value Ref Range BUN 20 6 - 20 mg/dL CBC WITH AUTOMATED DIFF Collection Time: 02/01/21  5:47 AM  
Result Value Ref Range WBC 9.1 4.4 - 11.3 K/uL  
 RBC 4.40 (L) 4.50 - 5.90 M/uL  
 HGB 12.9 (L) 13.5 - 17.5 g/dL HCT 38.6 36 - 46 % MCV 87.7 80 - 100 FL  
 MCH 29.3 (L) 31 - 34 PG  
 MCHC 33.4 31.0 - 36.0 g/dL  
 RDW 17.4 (H) 11.5 - 14.5 % PLATELET 88 K/uL MPV 9.4 6.5 - 11.5 FL  
 NRBC 0.1  WBC ABSOLUTE NRBC 0.01 K/uL NEUTROPHILS 91 (H) 42 - 75 % LYMPHOCYTES 6 (L) 20.5 - 51.1 % MONOCYTES 3 1.7 - 9.3 % EOSINOPHILS 0 (L) 0.9 - 2.9 % BASOPHILS 0 0.0 - 2.5 % ABS. NEUTROPHILS 8.3 (H) 1.8 - 7.7 K/UL  
 ABS. LYMPHOCYTES 0.5 (L) 1.0 - 4.8 K/UL  
 ABS. MONOCYTES 0.3 0.2 - 2.4 K/UL  
 ABS. EOSINOPHILS 0.0 0.0 - 0.7 K/UL  
 ABS. BASOPHILS 0.0 0.0 - 0.2 K/UL  
CBC WITH AUTOMATED DIFF Collection Time: 02/02/21  5:37 AM  
Result Value Ref Range WBC 7.9 4.4 - 11.3 K/uL  
 RBC 4.28 (L) 4.50 - 5.90 M/uL  
 HGB 12.5 (L) 13.5 - 17.5 g/dL HCT 37.1 36 - 46 % MCV 86.5 80 - 100 FL  
 MCH 29.1 (L) 31 - 34 PG  
 MCHC 33.6 31.0 - 36.0 g/dL  
 RDW 17.5 (H) 11.5 - 14.5 % PLATELET 610 K/uL MPV 10.5 6.5 - 11.5 FL  
 NRBC 0.2  WBC ABSOLUTE NRBC 0.01 K/uL NEUTROPHILS 92 (H) 42 - 75 % LYMPHOCYTES 4 (L) 20.5 - 51.1 % MONOCYTES 4 1.7 - 9.3 % EOSINOPHILS 0 (L) 0.9 - 2.9 % BASOPHILS 0 0.0 - 2.5 % ABS. NEUTROPHILS 7.2 1.8 - 7.7 K/UL  
 ABS. LYMPHOCYTES 0.3 (L) 1.0 - 4.8 K/UL  
 ABS. MONOCYTES 0.3 0.2 - 2.4 K/UL  
 ABS. EOSINOPHILS 0.0 0.0 - 0.7 K/UL  
 ABS. BASOPHILS 0.0 0.0 - 0.2 K/UL METABOLIC PANEL, BASIC Collection Time: 02/02/21  5:37 AM  
Result Value Ref Range Sodium 145 136 - 145 mmol/L Potassium 2.6 (LL) 3.5 - 5.1 mmol/L Chloride 108 97 - 108 mmol/L  
 CO2 26 21 - 32 mmol/L Anion gap 11 5 - 15 mmol/L Glucose 105 (H) 65 - 100 mg/dL BUN 18 6 - 20 mg/dL Creatinine 0.87 0.55 - 1.02 mg/dL BUN/Creatinine ratio 21 (H) 12 - 20 GFR est AA >60 >60 ml/min/1.73m2 GFR est non-AA >60 >60 ml/min/1.73m2 Calcium 7.7 (L) 8.5 - 10.1 mg/dL MAGNESIUM Collection Time: 02/02/21  5:37 AM  
Result Value Ref Range Magnesium 1.5 (L) 1.6 - 2.4 mg/dL D DIMER Collection Time: 02/02/21  5:37 AM  
Result Value Ref Range D-dimer >35.20 (H) 0.19 - 0.50 mg/L FEU ALBUMIN Collection Time: 02/02/21  5:37 AM  
Result Value Ref Range Albumin 2.4 (L) 3.5 - 5.0 g/dL Imaging: Xr Chest [facility] Result Date: 1/27/2021 The heart size is within normal limits. No evidence of focal airspace consolidation. No evidence of pulmonary edema. No pneumothorax. Atherosclerotic arterial calcification. Assessment//Plan Problem List: 
Hospital Problems  Never Reviewed Codes Class Noted POA * (Principal) COVID-19 ICD-10-CM: U07.1 ICD-9-CM: 079.89  1/28/2021 Yes Dehydration ICD-10-CM: E86.0 ICD-9-CM: 276.51  1/28/2021 Yes Hypernatremia ICD-10-CM: E87.0 ICD-9-CM: 276.0  1/28/2021 Yes Failure to thrive in adult ICD-10-CM: R62.7 ICD-9-CM: 783.7  1/28/2021 Yes Essential hypertension ICD-10-CM: I10 
ICD-9-CM: 401.9  1/28/2021 Yes Hyperlipidemia ICD-10-CM: E78.5 ICD-9-CM: 272.4  1/28/2021 Yes COPD exacerbation 
-Patient is currently not hypoxic 
-Continue steroid and breathing treatment as needed 
  
Abnormal D-dimer 
-Elevated D-dimer of 16.93 likely from COVID-19 infection 
-Patient is not hypoxic and therefore will hold off on CTA of the chest 
-Anticoagulation per Covid protocol initially but patient had bloody bowel movement and noted that occult blood is positive so discontinued heparin drip 
-D-dimer 1 initially increased to 35.1 but then decreased to 24.66 on 1/29 ut on repeat on 2/2 increased again to >35.2 
- patient had picc line in place and attempted to get CTA but patient pulled out line once again.  
  
COVID-19 infection 
-Patient with no acute finding on chest x-ray 
-Rapid Covid test on 1/27 was positive and PCR testing also positive 
-Not hypoxic at current so no remdesevir or steroids.  
-C-reactive protein on downward trend 
-Procalcitonin normal range 
  
Hypernatremia 
-Initially 153 on admission has been on downward trend with IV fluids and discontinued.  
-Monitor daily labs 
  
Elevated creatinine 
-Continue current IV fluid 
-Monitor daily renal function 
-1/30: Resolved 
 
Hypokalemia  
- was low on admission at 3.1 and resolved but then on 2/2 decreased to 2.6 from poor PO intake.  
- supplemented with IV riders but patient pulled out PICC line so will attempt po with 40meq kcl oral Bid  
- follow repeat labs in AM 
  
Hypomagnesemia 
- was low at 1.5 and supplemented with 2gm IV rider prior to patient pulling out IV line.  
- follow up repeat mag level  
 
Altered mental status 
-Unclear baseline mental status 
-Could be metabolic encephalopathy due to renal insufficiency and electrolyte imbalance 
 
Bloody bowel movement 
-Noted on 1/29 so heparin drip was discontinued 
-Monitor with serial H&H's and stable hemoglobin of 15.4 
-Occult blood noted to be positive 
-Monitor for recurrent episodes and monitor H&H 
-GI consultation 
 
Hypertension 
-Continue metoprolol 
-Every 4 hours 
 
Dyslipidemia 
-Continue statin 
 
GI prophylaxis 
-IV Protonix twice daily 
 
DVT prophylaxis 
-Contraindicated secondary to bloody bowel movement 
 
Full Code  
Will need to contact patient sister about goals of care and code status  
 
Spent 30 minutes evaluting and coordinating patient care of which >50% was spent  coordinating and counseling.   
 
 
Electronically signed by Aleyda Camargo MD on 2/2/2021 at 6:50 PM

## 2021-02-03 NOTE — ROUTINE PROCESS
RA. Resting with eyes closed. Incont. Brief. No resp distress. No cough noted at present. Pt positioned with wedge and pillowed for safety and comfort BP low Dr. Broussard notified and will continue to monitor.

## 2021-02-04 NOTE — ROUTINE PROCESS
MA intact to rt arm. romario to maintain site. Resting with eyes open. No resp distress. incont brief. Turning self in bed.

## 2021-02-04 NOTE — DISCHARGE SUMMARY
Physician Discharge Summary Patient: Sera Trujillo MRN: [id number] YOB: 1940  Age: [de-identified] y.o. Sex: female PCP: Mikhail Sood MD 
 
Allergies: Codeine, Ibuprofen, and Morphine Admit date: 1/27/2021 Admitting Provider: Kai Tanner MD 
 
Discharge date: 2/4/2021 Discharging Provider: Malissa Vann MD 
 
* Admission Diagnoses:  
COVID-19 [U07.1] * Discharge Diagnoses:   
Hospital Problems as of 2/4/2021 Never Reviewed Codes Class Noted - Resolved POA * (Principal) COVID-19 ICD-10-CM: U07.1 ICD-9-CM: 079.89  1/28/2021 - Present Yes Dehydration ICD-10-CM: E86.0 ICD-9-CM: 276.51  1/28/2021 - Present Yes Hypernatremia ICD-10-CM: E87.0 ICD-9-CM: 276.0  1/28/2021 - Present Yes Failure to thrive in adult ICD-10-CM: R62.7 ICD-9-CM: 783.7  1/28/2021 - Present Yes Essential hypertension ICD-10-CM: I10 
ICD-9-CM: 401.9  1/28/2021 - Present Yes Hyperlipidemia ICD-10-CM: E78.5 ICD-9-CM: 272.4  1/28/2021 - Present Yes * Hospital Course:  
[de-identified]year old female from nursing home for poor appetite and intake for several days. Genesis Needle here on the 22nd for cough and fever and diagnosed with \"viral syndrome\".  She returns now with increasing lethargy and food and water refusal. Lanny Breslow today reveals a + COVID test which has likely been present since she developed fever and shortness of breath on the 22nd.  She does not answer any questions today and rolls back and forth and curls up in the fetal position to avoid the examiner. Domenica Tanner were evidently problems with the copy machine at the facility so no papers were sent. Lucia Gregorio is reportedly a full code and the nurses had obtained her usual medications via phone for me and are placing them in the computer. Domenica Tanner was no reported vomiting or diarrhea.  Labs here revealed that she was dehydrated with BAYRON.  Fortunately her oxygen saturation was greater than 95% on room air and she has no work of breathing noted.  She was admitted for fluid replacement. COPD exacerbation 
-Patient is currently not hypoxic 
-Continue steroid and breathing treatment as needed 
  
Abnormal D-dimer 
-Elevated D-dimer of 16.93 likely from COVID-19 infection 
-Patient is not hypoxic and therefore will hold off on CTA of the chest 
-Anticoagulation per Covid protocol initially but patient had bloody bowel movement and noted that occult blood is positive so discontinued heparin drip 
-D-dimer 1 initially increased to 35.1 but then decreased to 24.66 on 1/29 ut on repeat on 2/2 increased again to >35.2 
- patient had picc line in place and attempted to get CTA but patient pulled out line once again.  
  
COVID-19 infection 
-Patient with no acute finding on chest x-ray 
-Rapid Covid test on 1/27 was positive and PCR testing also positive 
-Not hypoxic at current so no remdesevir or steroids. -C-reactive protein on downward trend 
-Procalcitonin normal range 
  
Hypernatremia 
-Initially 153 on admission has been on downward trend with IV fluids and discontinued. Most likely secondary to poor p.o. intake partly due to COVID-19 positive state continue to encourage p.o. intake as well as encourage supplement commended 
  
Elevated creatinine 
-Continue current IV fluid 
-Monitor daily renal function 
-1/30: Resolved 
  
Hypokalemia - was low on admission at 3.1 and resolved but then on 2/2 decreased to 2.6 from poor PO intake. - supplemented with IV riders but patient pulled out PICC line so will attempt po with 40meq kcl oral Bid  
- repeat K+ ob 2/3 is still low at 2.7.  
-Patient overnight repeat potassium level still low but was given 10 mg IV KCl riders x6 every 1 hour apart and repeat potassium is at 3.5 would continue and increase her previous 20 twice daily dosing to 40 twice daily dosing and monitor repeat labs in 1 week 
  
Hypomagnesemia - was low at 1.5 and supplemented with 2gm IV rider prior to patient pulling out IV line but still low at 1.6 so continue oral supllkents. With patient's poor p.o. intake will continue oral supplementation twice daily 
  
Altered mental status 
-Unclear baseline mental status but nursing home states that she mostly sleeps throughout the day and discussed this with patient's next of kin which is her sister who did not feel comfortable making decisions on her behalf so for that reason patient continues to be a full code discussed with her about comfort care and she states that patient is most likely \" bored\" but again she does not feel comfortable making decisions for her but unfortunately she is only next of kin that we are able to find for this patient. 
  
Bloody bowel movement 
-Noted on 1/29 so heparin drip was discontinued 
-Monitor with serial H&H's and stable hemoglobin of 15.4 
-Occult blood noted to be positive 
-Patient's hemoglobin stable at 12.5 and had been in similar range since bloody bowel episode and no further bloody bowel movements were noted after stopping anticoagulation Hypertension 
-As were monitored every 4 hours but metoprolol dosing was discontinued as patient had bradycardic episodes. For the most part patient's blood pressure has been variable and would request close monitoring and adding blood pressure medication if constantly stays elevated 
  
Dyslipidemia 
-Continue statin * Procedures: * No surgery found * Consults:  
Comprehensive Nutrition Assessment 
  
Type and Reason for Visit: Positive nutrition screen, Initial 
  
Nutrition Recommendations/Plan:  
Continue w/ current cardiac, soft solid foods Ensure x3/day Request a speech eval  
Request a pre-alb & total protein lab value  
  
Obtain an ABW to better assess patient's current nutrition status Monitor & Record daily wts, po & supplement intakes  
  
Will continue to follow as high level nutrition care  
  
Nutrition Assessment:  [de-identified]year old female from nursing home for poor appetite and intake for several days. Covid +. Noted poor intake >25% @ meals per pt's RN. Stated that she does drink the liquids on her meal tray. loss of appetite most likely r/t covid 19. Nutrition related lab values: hbg (12.5), glucose (105), mg (1.5), mg (1.5), Alb (2.4), Ca (7.7), Corrected Ca ( 8.98). Meds: heparin, lipitor. Nutrition Supplements: vitamin D (2,000 units), Zinc (50mg). 
  
Malnutrition Assessment: 
Malnutrition Status:  Mild malnutrition(unable to perform NFPE due to precautions of covid 19) Context:  Acute illness Findings of the 6 clinical characteristics of malnutrition:  
Energy Intake:  7 - 50% or less of est energy requirements for 5 or more days Weight Loss:  Unable to assess Body Fat Loss:  Unable to assess, Muscle Mass Loss:  Unable to assess, Fluid Accumulation:  Unable to assess,   
 Strength:  Not performed  
  
  
  
Estimated Daily Nutrient Needs: 
Energy (kcal): 1,300kcals (1,053. 75BMR x 1.2); Weight Used for Energy Requirements: Current Protein (g): 56-67g (1.0-1.2g/kg); Weight Used for Protein Requirements: Current Fluid (ml/day): 1,300ml; Method Used for Fluid Requirements: 1 ml/kcal 
  
  
Nutrition Related Findings:  No reported n/v, c/d, ??? oral dysphagia r/t recent decline in po intake. Currently bedbound, unable to assess for muscle or fat wasting, & Skin: intacted dry.   
  
Wounds:   
None    
  
Current Nutrition Therapies: DIET CARDIAC Soft Solids DIET NUTRITIONAL SUPPLEMENTS All Meals; Ensure Enlive 
  
Anthropometric Measures: 
· Height:  5' 6\" (167.6 cm) · Current Body Wt:  56.7 kg (125 lb) -EST. WEIGHT · Ideal Body Wt:  130 lbs:  96.2 % · Adjusted Body Weight:   ; Weight Adjustment for: No adjustment · BMI Category:  Underweight (BMI less than 22) age over 72    
  
Nutrition Diagnosis:  
· Inadequate protein-energy intake related to other (specify)(loss of appetite most likely r/t covid +) as evidenced by intake 0-25%, poor intake prior to admission 
  
  
Nutrition Interventions:  
Food and/or Nutrient Delivery: Continue current diet, Start oral nutrition supplement(ensure x3/day) Nutrition Education and Counseling: No recommendations at this time Coordination of Nutrition Care: Swallow evaluation, Speech therapy, Continue to monitor while inpatient, Interdisciplinary rounds 
  
Goals: 
>50% EENs x4days, Wt. Stability =/+kg x 7days    
  
Nutrition Monitoring and Evaluation:  
Behavioral-Environmental Outcomes: None identified Food/Nutrient Intake Outcomes: Food and nutrient intake, Supplement intake Physical Signs/Symptoms Outcomes: Biochemical data, Chewing or swallowing, Weight 
  
Discharge Planning:   
Continue oral nutrition supplement Vitals Last 24 Hours: 
Patient Vitals for the past 24 hrs: 
 Temp Pulse Resp BP SpO2  
02/04/21 0730 97.3 °F (36.3 °C) (!) 51 16 (!) 136/55 97 % 02/04/21 0510 (!) 96.7 °F (35.9 °C) (!) 54 18 (!) 152/62 98 % 02/03/21 2047 97.7 °F (36.5 °C) (!) 50 20 (!) 118/57 95 % 02/03/21 1537 97 °F (36.1 °C) 70 20 114/60 95 % 02/03/21 1110 (!) 96.4 °F (35.8 °C) 60 20 (!) 128/47 96 % Discharge Exam: 
General: Awakens but resting in no acute distress Head:  Normocephalic, without obvious abnormality, atraumatic. Eyes:  Conjunctivae/corneas clear. Pupils equal, round, reactive to light. Extraocular movements intact. Lungs:  Clear to auscultation bilaterally. no wheeze, rales, crackles, rhonchi Chest wall: No tenderness or deformity. Heart:  Regular rate and rhythm, S1, S2 normal, no murmur, click, rub or gallop. Abdomen:  Soft, non-tender. Bowel sounds normal. No masses,  No organomegaly. Extremities: Extremities normal, atraumatic, no cyanosis or edema. Pulses: 2+ and symmetric all extremities. Skin: Skin color, texture, turgor normal. No rashes or lesions Neurologic: Awake, Alert, and able to determine orientation patient resting comfortably no acute distress Labs: 
Recent Results (from the past 24 hour(s)) METABOLIC PANEL, BASIC Collection Time: 02/03/21  7:25 PM  
Result Value Ref Range Sodium 144 136 - 145 mmol/L Potassium 2.6 (LL) 3.5 - 5.1 mmol/L Chloride 109 (H) 97 - 108 mmol/L  
 CO2 24 21 - 32 mmol/L Anion gap 11 5 - 15 mmol/L Glucose 69 65 - 100 mg/dL BUN 15 6 - 20 mg/dL Creatinine 0.74 0.55 - 1.02 mg/dL BUN/Creatinine ratio 20 12 - 20 GFR est AA >60 >60 ml/min/1.73m2 GFR est non-AA >60 >60 ml/min/1.73m2 Calcium 8.0 (L) 8.5 - 10.1 mg/dL METABOLIC PANEL, BASIC Collection Time: 02/04/21  5:44 AM  
Result Value Ref Range Sodium 146 (H) 136 - 145 mmol/L Potassium 3.5 3.5 - 5.1 mmol/L Chloride 105 97 - 108 mmol/L  
 CO2 26 21 - 32 mmol/L Anion gap 15 5 - 15 mmol/L Glucose 61 (L) 65 - 100 mg/dL BUN 12 6 - 20 mg/dL Creatinine 0.64 0.55 - 1.02 mg/dL BUN/Creatinine ratio 19 12 - 20 GFR est AA >60 >60 ml/min/1.73m2 GFR est non-AA >60 >60 ml/min/1.73m2 Calcium 7.7 (L) 8.5 - 10.1 mg/dL Imaging: No results found. * Discharge Condition: good * Disposition: long term care at [address] [place] [address] Recommend repeat CBC and BMP in 1 week Discharge Medications: 
Current Discharge Medication List  
  
START taking these medications Details  
cholecalciferol (VITAMIN D3) (2,000 UNITS /50 MCG) cap capsule Take 1 Cap by mouth daily. Qty: 30 Cap, Refills: 0  
  
magnesium oxide (MAG-OX) 400 mg tablet Take 1 Tab by mouth two (2) times a day. Qty: 60 Tab, Refills: 0  
  
nystatin (MYCOSTATIN) 100,000 unit/mL suspension Take 5 mL by mouth four (4) times daily for 10 days. swish and spit Qty: 200 mL, Refills: 0  
  
zinc sulfate (ZINCATE) 220 (50) mg capsule Take 1 Cap by mouth daily. Qty: 30 Cap, Refills: 0  
  
ascorbic acid, vitamin C, (VITAMIN C) 500 mg tablet Take 1 Tab by mouth two (2) times a day. Qty: 60 Tab, Refills: 0 CONTINUE these medications which have CHANGED Details  
potassium chloride (K-DUR, KLOR-CON) 20 mEq tablet Take 2 Tabs by mouth two (2) times a day. Qty: 120 Tab, Refills: 0 CONTINUE these medications which have NOT CHANGED Details  
acetaminophen (TylenoL) 325 mg tablet Take 325 mg by mouth every four (4) hours as needed for Pain.  
  
pantoprazole (Protonix) 40 mg tablet Take 40 mg by mouth daily. atorvastatin (Lipitor) 20 mg tablet Take 20 mg by mouth daily. tamsulosin (Flomax) 0.4 mg capsule Take 0.4 mg by mouth daily. mirabegron ER (MYRBETRIQ) 50 mg ER tablet Take 50 mg by mouth daily. STOP taking these medications  
  
 metoprolol tartrate (LOPRESSOR) 50 mg tablet Comments:  
Reason for Stopping:   
   
  
 
 
 
* Follow-up Care/Patient Instructions: Activity: Activity as tolerated with fall precautions Diet: Cardiac Diet soft solids with Ensure Enlive with all meals Would recommend continued discussion with patient's family about goals of care as well as CODE STATUS as patient will continue to have issues with failure to thrive and high risk for multiple hospital admissions Follow-up Information Follow up With Specialties Details Why Contact Info Valentina Coker MD Family Medicine In 3 days  [address] Yaneli Gaines [phone] Spent 35 minutes evaluting and coordinating patient care of which >50% was spent coordinating and counseling.   
 
Signed: 
Orly Bennett MD 
2/4/2021 
10:53 AM

## 2021-02-04 NOTE — PROGRESS NOTES
Per June Mayo at [address], patient will go to room 210 bed 2. They have to make some room changes before we can send her. Asked him to call me when he is ready for the patient and we will set up transport at that time.

## 2021-02-14 PROBLEM — N17.9 AKI (ACUTE KIDNEY INJURY) (HCC): Status: ACTIVE | Noted: 2021-01-01

## 2021-02-14 NOTE — ED TRIAGE NOTES
Pt is covid positive. Pt was recently admitted and discharged on 2-4-2021 for failure to thrive. Pt has no complaints other than eating less than 10% of meals. Pt denies pain. Pt seems altered, pleasant and follows commands. AOx1. Pt states she feels a little hungry.

## 2021-02-14 NOTE — ED PROVIDER NOTES
EMERGENCY DEPARTMENT HISTORY AND PHYSICAL EXAM 
 
 
Date: 2/14/2021 Patient Name: Delio Oneill History of Presenting Illness Chief Complaint Patient presents with  Anorexia Failure to thrive History Provided By: Patient HPI: Delio Oneill, [de-identified] y.o. female with a past medical history significant Dementia and failure to thrive presents to the ED with cc of failure to thrive. Patient was hospitalized for 1 week and discharged 10 days ago after admission for COVID-19 failure to thrive decreased p.o. intake not eating dehydrated return to nursing home over the last several days the patient's had limited p.o. intake sent to the ED for evaluation. Patient gives no reliable history she is oriented only to person though awake alert and talkative. There are no other complaints, changes, or physical findings at this time. PCP: Willean Fothergill, MD 
 
No current facility-administered medications on file prior to encounter. Current Outpatient Medications on File Prior to Encounter Medication Sig Dispense Refill  potassium chloride (K-DUR, KLOR-CON) 20 mEq tablet Take 2 Tabs by mouth two (2) times a day. 120 Tab 0  cholecalciferol (VITAMIN D3) (2,000 UNITS /50 MCG) cap capsule Take 1 Cap by mouth daily. 30 Cap 0  
 magnesium oxide (MAG-OX) 400 mg tablet Take 1 Tab by mouth two (2) times a day. 60 Tab 0  
 nystatin (MYCOSTATIN) 100,000 unit/mL suspension Take 5 mL by mouth four (4) times daily for 10 days. swish and spit 200 mL 0  
 zinc sulfate (ZINCATE) 220 (50) mg capsule Take 1 Cap by mouth daily. 30 Cap 0  
 ascorbic acid, vitamin C, (VITAMIN C) 500 mg tablet Take 1 Tab by mouth two (2) times a day. 60 Tab 0  
 acetaminophen (TylenoL) 325 mg tablet Take 325 mg by mouth every four (4) hours as needed for Pain.  pantoprazole (Protonix) 40 mg tablet Take 40 mg by mouth daily.  atorvastatin (Lipitor) 20 mg tablet Take 20 mg by mouth daily.     
 tamsulosin (Flomax) 0.4 mg capsule Take 0.4 mg by mouth daily.  mirabegron ER (MYRBETRIQ) 50 mg ER tablet Take 50 mg by mouth daily. Past History Past Medical History: 
Past Medical History:  
Diagnosis Date  COPD (chronic obstructive pulmonary disease) ([facility])  COVID-19  Dementia ([facility] [address]  Hypertension  Hypokalemia Past Surgical History: 
History reviewed. No pertinent surgical history. Family History: 
History reviewed. No pertinent family history. Social History: 
Social History Tobacco Use  Smoking status: Never Smoker  Smokeless tobacco: Never Used Substance Use Topics  Alcohol use: Not Currently  Drug use: Not on file Allergies: Allergies Allergen Reactions  Codeine Rash  Ibuprofen Unknown (comments)  Morphine Rash Review of Systems Review of systems unobtainable due to dementia Review of Systems Physical Exam  
Thin elderly female appears dehydrated nontoxic Physical Exam 
Vitals signs and nursing note reviewed. Constitutional:   
   General: She is not in acute distress. Appearance: She is not ill-appearing, toxic-appearing or diaphoretic. HENT:  
   Head: Normocephalic and atraumatic. Nose: Nose normal.  
   Mouth/Throat:  
   Mouth: Mucous membranes are dry. Eyes:  
   Extraocular Movements: Extraocular movements intact. Conjunctiva/sclera: Conjunctivae normal.  
   Pupils: Pupils are equal, round, and reactive to light. Neck: Musculoskeletal: Normal range of motion and neck supple. No neck rigidity or muscular tenderness. Cardiovascular:  
   Rate and Rhythm: Normal rate and regular rhythm. Pulses: Normal pulses. Heart sounds: Normal heart sounds. No murmur. Pulmonary:  
   Effort: Pulmonary effort is normal. No respiratory distress. Breath sounds: Normal breath sounds. No wheezing, rhonchi or rales. Chest:  
   Chest wall: No tenderness.   
Abdominal:  
   General: Abdomen is flat.  
   Palpations: There is no mass. Tenderness: There is no abdominal tenderness. There is no right CVA tenderness, left CVA tenderness, guarding or rebound. Hernia: No hernia is present. Musculoskeletal: Normal range of motion. General: No tenderness, deformity or signs of injury. Right lower leg: No edema. Left lower leg: No edema. Skin: 
   General: Skin is warm. Findings: No erythema or rash. Neurological:  
   General: No focal deficit present. Mental Status: She is alert. She is disoriented. Cranial Nerves: No cranial nerve deficit. Sensory: No sensory deficit. Motor: No weakness. Comments: Oriented only to person Psychiatric:  
   Comments: Demented has very little understanding of current situation Lab and Diagnostic Study Results Labs - No results found for this or any previous visit (from the past 12 hour(s)). Radiologic Studies -  
@lastxrresult@ CT Results  (Last 48 hours) None CXR Results  (Last 48 hours) 02/14/21 1800  XR CHEST PORT Final result Impression:  No acute cardiopulmonary findings. Narrative:  Study: Chest radiograph(s), 1 view Clinical Indication: Failure to thrive. Comparison: Chest radiograph dated 1/27/2021. Findings: The cardiac silhouette is normal and unchanged in size. Overlying monitoring  
leads. Lung volumes are maintained. No focal consolidation, large pleural effusion, or  
discernible pneumothorax. Medical Decision Making - I am the first provider for this patient. - I reviewed the vital signs, available nursing notes, past medical history, past surgical history, family history and social history. - Initial assessment performed. The patients presenting problems have been discussed, and they are in agreement with the care plan formulated and outlined with them.   I have encouraged them to ask questions as they arise throughout their visit. Vital Signs-Reviewed the patient's vital signs. Patient Vitals for the past 12 hrs: 
 Temp Pulse Resp BP SpO2  
02/14/21 1719 97.6 °F (36.4 °C) 99 20 109/72 96 % Records Reviewed: Nursing Notes and Old Medical Records The patient presents with adult failure to thrive dehydration with a differential diagnosis of progression of dementia; underlying infection dehydration electrolyte abnormality CNS abnormality persistent COVID-19 symptoms ED Course: EKG performed at 1825 interpreted 1830 shows sinus tachycardia at 106 probable left atrial enlargement probable old anterior septal MI there is T wave inversions in leads II, III, aVF and V5 V6; there is mild ST depression in leads V4 through V6 and aVF no immediate old EKG to compare EKG from January 28, 2021 shows similar ST-T changes though more pronounced in the inferior leads tonight Provider Notes (Medical Decision Making): Returns again after not eating for several days; adult failure to thrive MDM Procedures Medical Decision Makingedical Decision Making Performed by: Domonique Aguirre MD 
PROCEDURES: 
Procedures Disposition Disposition: Admitted to Floor Medical Floor the case was discussed with the admitting physician [facility] DISCHARGE PLAN: 
1. Current Discharge Medication List  
  
CONTINUE these medications which have NOT CHANGED Details  
potassium chloride (K-DUR, KLOR-CON) 20 mEq tablet Take 2 Tabs by mouth two (2) times a day. Qty: 120 Tab, Refills: 0  
  
cholecalciferol (VITAMIN D3) (2,000 UNITS /50 MCG) cap capsule Take 1 Cap by mouth daily. Qty: 30 Cap, Refills: 0  
  
magnesium oxide (MAG-OX) 400 mg tablet Take 1 Tab by mouth two (2) times a day. Qty: 60 Tab, Refills: 0  
  
nystatin (MYCOSTATIN) 100,000 unit/mL suspension Take 5 mL by mouth four (4) times daily for 10 days. swish and spit Qty: 200 mL, Refills: 0  
  
zinc sulfate (ZINCATE) 220 (50) mg capsule Take 1 Cap by mouth daily. Qty: 30 Cap, Refills: 0  
  
ascorbic acid, vitamin C, (VITAMIN C) 500 mg tablet Take 1 Tab by mouth two (2) times a day. Qty: 60 Tab, Refills: 0  
  
acetaminophen (TylenoL) 325 mg tablet Take 325 mg by mouth every four (4) hours as needed for Pain.  
  
pantoprazole (Protonix) 40 mg tablet Take 40 mg by mouth daily. atorvastatin (Lipitor) 20 mg tablet Take 20 mg by mouth daily. tamsulosin (Flomax) 0.4 mg capsule Take 0.4 mg by mouth daily. mirabegron ER (MYRBETRIQ) 50 mg ER tablet Take 50 mg by mouth daily. 2.  
Follow-up Information None 3. Return to ED if worse 4. Current Discharge Medication List  
  
 
 
 
Diagnosis Clinical Impression: Adult failure to thrive Attestations: 
 
Clayborn Cockayne, MD 
 
Please note that this dictation was completed with [employer], the computer voice recognition software. Quite often unanticipated grammatical, syntax, homophones, and other interpretive errors are inadvertently transcribed by the computer software. Please disregard these errors. Please excuse any errors that have escaped final proofreading. Thank you.

## 2021-02-14 NOTE — Clinical Note
Patient Class[de-identified] OBSERVATION [104]   Type of Bed: Telemetry [19]   Reason for Observation: IVF   Admitting Diagnosis: BAYRON (acute kidney injury) ([facility] [[id number]]   Admitting Diagnosis: Dehydration [276.51. ICD-9-CM]   Admitting Diagnosis: Hypernatremia [294285]   Admitting Physician: Sheng Bell   Attending Physician: Ana Auguste [[id number]]

## 2021-02-15 PROBLEM — R63.0 ANOREXIA: Status: ACTIVE | Noted: 2021-01-01

## 2021-02-15 NOTE — ED NOTES
TRANSFER - OUT REPORT: 
 
Verbal report given to Leah(name) on Sincere Nails  being transferred to [facility] for routine progression of care Report consisted of patients Situation, Background, Assessment and  
Recommendations(SBAR). Information from the following report(s) SBAR was reviewed with the receiving nurse. Opportunity for questions and clarification was provided. Patient transported with: 
 Registered Nurse

## 2021-02-15 NOTE — H&P
History and Physical 
 
Patient: Arun Garcia MRN: [id number]  SSN: [id number] YOB: 1940  Age: [de-identified] y.o. Sex: female Subjective:  
  
Arun Garcia is a [de-identified] y.o. female resident of [facility], with PMH of Hypertension, Dementia, and recent Covid infection, who was sent to ED for evaluation of poor po intake. Patient, secondary to dementia, is unable to provide any meaningful HPI. In ED, labs show BAYRON, dehydration, hypernatremia and elevated Troponin. EKG with some diffuse T wave changes. She was treated with IVF and referred for Observation. Past Medical History:  
Diagnosis Date  COPD (chronic obstructive pulmonary disease) ([facility])  COVID-19  Dementia ([facility] [address]  Hypertension  Hypokalemia History reviewed. No pertinent surgical history. History reviewed. No pertinent family history. Social History Tobacco Use  Smoking status: Never Smoker  Smokeless tobacco: Never Used Substance Use Topics  Alcohol use: Not Currently Prior to Admission medications Medication Sig Start Date End Date Taking? Authorizing Provider  
potassium chloride (K-DUR, KLOR-CON) 20 mEq tablet Take 2 Tabs by mouth two (2) times a day. 2/4/21   Mary Broussard MD  
cholecalciferol (VITAMIN D3) (2,000 UNITS /50 MCG) cap capsule Take 1 Cap by mouth daily. 2/5/21   Mary Broussard MD  
magnesium oxide (MAG-OX) 400 mg tablet Take 1 Tab by mouth two (2) times a day. 2/4/21   Pérez Dawson MD  
nystatin (MYCOSTATIN) 100,000 unit/mL suspension Take 5 mL by mouth four (4) times daily for 10 days. swish and spit 2/4/21 2/14/21  Pérez Dawson MD  
zinc sulfate (ZINCATE) 220 (50) mg capsule Take 1 Cap by mouth daily. 2/5/21   Mary Broussard MD  
ascorbic acid, vitamin C, (VITAMIN C) 500 mg tablet Take 1 Tab by mouth two (2) times a day.  2/4/21   Pérez Dawson MD  
acetaminophen (TylenoL) 325 mg tablet Take 325 mg by mouth every four (4) hours as needed for Pain.    Trinh Ignacio MD  
pantoprazole (Protonix) 40 mg tablet Take 40 mg by mouth daily. Trinh Ignacio MD  
atorvastatin (Lipitor) 20 mg tablet Take 20 mg by mouth daily. Trinh Ignacio MD  
tamsulosin (Flomax) 0.4 mg capsule Take 0.4 mg by mouth daily. Trinh Ignacio MD  
mirabegron ER (MYRBETRIQ) 50 mg ER tablet Take 50 mg by mouth daily. Trinh Ignacio MD  
  
 
Allergies Allergen Reactions  Codeine Rash  Ibuprofen Unknown (comments)  Morphine Rash Review of Systems: 
Review of systems not obtained due to patient factors. Objective:  
 
Vitals:  
 02/14/21 1719 02/14/21 2037 BP: 109/72 114/72 Pulse: 99 (!) 109 Resp: 20 22 Temp: 97.6 °F (36.4 °C) 97.9 °F (36.6 °C) SpO2: 96% 94% Weight: 56.7 kg (125 lb) Physical Exam: 
GEN: Thin,elderly female, NAD HEENT: Atraumatic, EOMi, dry oral mucosa NECK: Supple, trachea midline, no JVD PULM: CTAB, no accessory muscle use CV/PV: Tachycardia, No M/R/G. No edema GI: Soft, non-tender, +BS 
: Deferred MSK: GARVEY. No joint deformities INTEG: Warm, dry, intact. NEURO: Alert. Oriented to self, confused to place, time, event. CN intact. No gross focal deficits PSYCH:No anxiety or agitation Assessment:  
 
Hospital Problems  Never Reviewed Codes Class Noted POA BAYRON (acute kidney injury) ([facility] [address] ICD-10-CM: N17.9 ICD-9-CM: 584.9  2/14/2021 Unknown Dehydration ICD-10-CM: E86.0 ICD-9-CM: 276.51  1/28/2021 Unknown Hypernatremia ICD-10-CM: E87.0 ICD-9-CM: 276.0  1/28/2021 Unknown Plan:  
 
Place in Observation DX: BAYRON, Dehydration, Hypernatremia ELOS 2MN Activity Bed rest w BCS Cardiac-Mech Soft Diet Reconcile home meds VTE ppx: LMWH Medical surrogate is sister Mari Pastrana [phone] Pt is a Full Code - will need to discuss w family BAYRON 
- Cr 1.49 
- Likely 2/2 poor po intake - IVF as ordered - Obtain UA 
- Repeat BMP in am 
- Nephrology consult if needed Hypernatremia - Sodium 158 - Given 1L NSS in ED 
- Change to LR @ 150ml/hr - Repeat BMP @ 0000 
- IVF changes pending new BMP results Elevated Troponin 
- Unable to determine of pt is having chest pain - Possibly 2/2 demand - Initial Trop 0.14 
- EKG shows T wave abnormalities diffusely - Repeat Troponin - LMWH 60mg x 1 
- Consult to Cardiology  
- Echo in am  
 
Hypertension - /72 
- Hold anti-hypertensives for now - Monitor BP Hyperlipidemia 
- Continue Statin - Lipid panel in am 
 
Prior Covid infection - Recently discharged (2/4) after beinginpt w Covid 19 
- Repeat Rapid Covid - If positive - Observe Droplet Precautions - No hypoxia, CXR negative Dementia - Chronic - Family/Medical surrogate need to discuss goals of care Total time spent on consultation and coordination of care - 50 mins Signed By: Lana Garcia NP February 14, 2021

## 2021-02-15 NOTE — ED NOTES
While placing patient in hospital bed an providing natalee-caare, patient noted to have a stage 1 pressure wound noted to sacrum.

## 2021-02-15 NOTE — PROGRESS NOTES
Progress Note Patient: Miracle King MRN: [id number]  SSN: [id number] YOB: 1940  Age: [de-identified] y.o. Sex: female Admit Date: 2/14/2021 LOS: 0 days Subjective:  
 
Patient presents from her long-term care facility with concern of poor p.o. intake. Patient is seen alert without distress. Patient cannot be communicated due to her baseline dementia. Objective:  
 
Vitals:  
 02/14/21 1719 02/14/21 2037 02/15/21 6568 BP: 109/72 114/72 109/69 Pulse: 99 (!) 109 98 Resp: 20 22 20 Temp: 97.6 °F (36.4 °C) 97.9 °F (36.6 °C) 97.6 °F (36.4 °C) SpO2: 96% 94% 95% Weight: 56.7 kg (125 lb) Intake and Output: 
Current Shift: 02/15 0701 - 02/15 1900 In: 300 [I.V.:300] Out: - Last three shifts: 02/13 1901 - 02/15 0700 In: 290 [I.V.:290] Out: - Physical Exam:  
GENERAL: alert, cooperative, no distress, appears stated age THROAT & NECK: normal and no erythema or exudates noted. LUNG: clear to auscultation bilaterally HEART: regular rate and rhythm, S1, S2 normal, no murmur, click, rub or gallop ABDOMEN: soft, non-tender. Bowel sounds normal. No masses,  no organomegaly EXTREMITIES:  extremities normal, atraumatic, no cyanosis or edema SKIN: no rash or abnormalities NEUROLOGIC: Awake and alert, nonfocal 
PSYCHIATRIC: non focal 
 
Lab/Data Review: All lab results for the last 24 hours reviewed. Recent Results (from the past 24 hour(s)) EKG, 12 LEAD, INITIAL Collection Time: 02/14/21  6:25 PM  
Result Value Ref Range Ventricular Rate 106 BPM  
 Atrial Rate 105 BPM  
 P-R Interval 154 ms QRS Duration 80 ms  
 Q-T Interval 335 ms QTC Calculation (Bezet) 445 ms Calculated P Axis 85 degrees Calculated R Axis 54 degrees Calculated T Axis -107 degrees Diagnosis Sinus tachycardia Probable left atrial enlargement Probable anterior infarct, age indeterminate Abnormal T, consider ischemia, diffuse leads Lateral leads are also involved Confirmed by Candelario Owens [phone]) on 2/15/2021 9:15:49 AM 
  
CBC WITH AUTOMATED DIFF Collection Time: 02/14/21  7:40 PM  
Result Value Ref Range WBC 11.7 (H) 4.4 - 11.3 K/uL  
 RBC 5.83 4.50 - 5.90 M/uL  
 HGB 17.1 13.5 - 17.5 g/dL HCT 52.2 (H) 36 - 46 % MCV 89.5 80 - 100 FL  
 MCH 29.3 (L) 31 - 34 PG  
 MCHC 32.8 31.0 - 36.0 g/dL  
 RDW 17.7 (H) 11.5 - 14.5 % PLATELET 82 K/uL MPV 9.3 6.5 - 11.5 FL  
 NRBC 0.4  WBC ABSOLUTE NRBC 0.04 K/uL NEUTROPHILS 86 (H) 42 - 75 % LYMPHOCYTES 10 (L) 20.5 - 51.1 % MONOCYTES 4 1.7 - 9.3 % EOSINOPHILS 0 (L) 0.9 - 2.9 % BASOPHILS 0 0.0 - 2.5 % ABS. NEUTROPHILS 10.0 (H) 1.8 - 7.7 K/UL  
 ABS. LYMPHOCYTES 1.1 1.0 - 4.8 K/UL  
 ABS. MONOCYTES 0.4 0.2 - 2.4 K/UL  
 ABS. EOSINOPHILS 0.0 0.0 - 0.7 K/UL  
 ABS. BASOPHILS 0.0 0.0 - 0.2 K/UL METABOLIC PANEL, COMPREHENSIVE Collection Time: 02/14/21  7:40 PM  
Result Value Ref Range Sodium 158 (H) 136 - 145 mmol/L Potassium 3.5 3.5 - 5.1 mmol/L Chloride 115 (H) 97 - 108 mmol/L  
 CO2 31 21 - 32 mmol/L Anion gap 12 5 - 15 mmol/L Glucose 119 (H) 65 - 100 mg/dL BUN 52 (H) 6 - 20 mg/dL Creatinine 1.49 (H) 0.55 - 1.02 mg/dL BUN/Creatinine ratio 35 (H) 12 - 20 GFR est AA 41 (L) >60 ml/min/1.73m2 GFR est non-AA 34 (L) >60 ml/min/1.73m2 Calcium 9.7 8.5 - 10.1 mg/dL Bilirubin, total 2.3 (H) 0.2 - 1.0 mg/dL AST (SGOT) 52 (H) 15 - 37 U/L  
 ALT (SGPT) 24 12 - 78 U/L Alk. phosphatase 99 45 - 117 U/L Protein, total 7.2 6.4 - 8.2 g/dL Albumin 3.2 (L) 3.5 - 5.0 g/dL Globulin 4.0 2.0 - 4.0 g/dL A-G Ratio 0.8 (L) 1.1 - 2.2    
TROPONIN I Collection Time: 02/14/21  7:40 PM  
Result Value Ref Range Troponin-I, Qt. 0.14 (H) <0.05 ng/mL SARS-COV-2 Collection Time: 02/15/21  2:15 AM  
Result Value Ref Range SARS-CoV-2 Nasopharyngeal    
TROPONIN I Collection Time: 02/15/21  2:15 AM  
Result Value Ref Range  Troponin-I, Qt. 0.13 (H) <0.05 ng/mL COVID-19 RAPID TEST Collection Time: 02/15/21  2:15 AM  
Result Value Ref Range Specimen source Nasopharyngeal    
 COVID-19 rapid test DETECTED (A) Not Detected METABOLIC PANEL, BASIC Collection Time: 02/15/21  5:30 AM  
Result Value Ref Range Sodium 160 (H) 136 - 145 mmol/L Potassium 2.7 (LL) 3.5 - 5.1 mmol/L Chloride 118 (H) 97 - 108 mmol/L  
 CO2 33 (H) 21 - 32 mmol/L Anion gap 9 5 - 15 mmol/L Glucose 125 (H) 65 - 100 mg/dL BUN 53 (H) 6 - 20 mg/dL Creatinine 1.51 (H) 0.55 - 1.02 mg/dL BUN/Creatinine ratio 35 (H) 12 - 20 GFR est AA 40 (L) >60 ml/min/1.73m2 GFR est non-AA 33 (L) >60 ml/min/1.73m2 Calcium 9.5 8.5 - 10.1 mg/dL CBC WITH AUTOMATED DIFF Collection Time: 02/15/21  5:30 AM  
Result Value Ref Range WBC 9.7 4.4 - 11.3 K/uL  
 RBC 4.80 4.50 - 5.90 M/uL  
 HGB 14.2 13.5 - 17.5 g/dL HCT 42.7 36 - 46 % MCV 89.1 80 - 100 FL  
 MCH 29.5 (L) 31 - 34 PG  
 MCHC 33.2 31.0 - 36.0 g/dL  
 RDW 17.6 (H) 11.5 - 14.5 % PLATELET 59 K/uL MPV 8.8 6.5 - 11.5 FL  
 NRBC 0.4  WBC ABSOLUTE NRBC 0.04 K/uL IMMATURE GRANULOCYTES PENDING %  
 ABS. IMM. GRANS. PENDING K/UL  
 NEUTROPHILS 86 (H) 42 - 75 % LYMPHOCYTES 10 (L) 20.5 - 51.1 % MONOCYTES 4 1.7 - 9.3 % EOSINOPHILS 0 (L) 0.9 - 2.9 % BASOPHILS 0 0.0 - 2.5 % ABS. NEUTROPHILS 8.3 (H) 1.8 - 7.7 K/UL  
 ABS. LYMPHOCYTES 1.0 1.0 - 4.8 K/UL  
 ABS. MONOCYTES 0.4 0.2 - 2.4 K/UL  
 ABS. EOSINOPHILS 0.0 0.0 - 0.7 K/UL  
 ABS. BASOPHILS 0.0 0.0 - 0.2 K/UL  
TROPONIN I Collection Time: 02/15/21  6:45 AM  
Result Value Ref Range Troponin-I, Qt. 0.17 (H) <0.05 ng/mL ECHO ADULT COMPLETE Collection Time: 02/15/21  9:38 AM  
Result Value Ref Range LV ED Vol A2C 29.02 mL IVSd 1.37 (A) 0.6 - 0.9 cm LVIDd 2.78 (A) 3.9 - 5.3 cm LVIDs 1.91 cm  
 LVOT d 2.03 cm  
 LVPWd 1.39 (A) 0.6 - 0.9 cm  
 LVOT SV 14.5 mL  
 LVOT SV 14.5 mL  
 BP EF 75.0 55 - 100 percent  LV Ejection Fraction MOD 2C 82 percent LV Ejection Fraction MOD 4C 65 percent LV Ejection Fraction MOD 4C 65 percent LV ED Vol BP 14.43 (A) 56 - 104 mL  
 LV ES Vol A2C 2.83 mL  
 LV ES Vol BP 3.60 (A) 19 - 49 mL  
 LVOT Peak Gradient 101.62 mmHg Left Ventricular Outflow Tract Mean Gradient 71.22 mmHg LVOT Peak Velocity 504.03 cm/s LVOT VTI 95.12 cm RVIDd 1.72 cm  
 LA Volume 18.30 22 - 52 mL  
 LA Vol 2C 13.29 (A) 22 - 52 mL  
 LA Vol 4C 22.32 22 - 52 mL Aortic Valve Area by Continuity of Peak Velocity 4.04 cm2 Aortic Valve Area by Continuity of VTI 4.84 cm2 Aortic Valve Area by Continuity of VTI 5.36 cm2 AoV PG 69.72 mmHg Aortic Regurgitant Pressure Half-time 376.06 ms Aortic Valve Systolic Mean Gradient 90.04 mmHg Aortic Valve Systolic Peak Velocity 053.48 cm/s Aortic valve mean velocity 303.51 cm/s AoV VTI 63.91 cm  
 MVA VTI 15.03 cm2 MVA VTI 15.03 cm2  
 MV Mean Gradient 4.57 mmHg Triscuspid Valve Regurgitation Peak Gradient 23.01 mmHg  
 TR Max Velocity 239.85 cm/s Ao Root D 3.18 cm  
 LV Mass .2 67 - 162 g Right Atrial Area 4C 6.0 cm2 RVSP 26.0 mmHg Est. RA Pressure 3.0 mmHg Imaging: Xr Chest [facility] Result Date: 2/14/2021 Study: Chest radiograph(s), 1 view Clinical Indication: Failure to thrive. Comparison: Chest radiograph dated 1/27/2021. Findings: The cardiac silhouette is normal and unchanged in size. Overlying monitoring leads. Lung volumes are maintained. No focal consolidation, large pleural effusion, or discernible pneumothorax. No acute cardiopulmonary findings. Echo Adult Complete Result Date: 2/15/2021 · LV: Estimated LVEF is >70%. Normal cavity size. Moderate concentric hypertrophy. Moderate outflow tract obstruction. Globally reduced systolic function. Hyperdynamic systolic function. · Significant intercavity gradient with LVOT peak gradient of 102 mmHG and mean gradient of 71 mmHG. · RV: Small right ventricle.  · AV: Aortic valve leaflet calcification present with reduced excursion. Severe aortic valve stenosis is present. Mild aortic valve regurgitation is present. · MV: Mitral valve leaflet calcification with reduced excursion. Mild mitral valve stenosis is present. Mean gradient of 4.5 mmHG. Mild mitral valve regurgitation is present. · TV: Mild tricuspid valve regurgitation is present. · Image quality for this study was technically difficult. · Echo study was limited due to patient's condition and limited due to patient's tolerance. Assessment and Plan: BAYRON 
-Likely from prerenal cause from poor p.o. intake 
-Continue IV fluid, will change to D5 due to hyponatremia 
-Monitor renal function Hypernatremia 
-Likely from free water depletion with poor p.o. intake 
-Change IV fluid to D5W 
-Monitor sodium level Hypokalemia 
-Also due to poor p.o. intake 
-Replace potassium Elevated troponin 
-Cardiology evaluating the patient 
-Patient with severe aortic stenosis, normal EF 
-Further work-up per cardiology Hypertension 
-Holding antihypertensives for now 
-Continue monitor blood pressure COVID-19 infection 
-Patient is not hypoxic and therefore does not meet criteria for remdesivir therapy 
-Chest x-ray without any acute finding 
-Start vitamin C and zinc 
 
Dyslipidemia 
-Continue statin Dementia 
-Supportive care 
-Consulted speech due to concern of her swallowing ability Anticipated disposition is 24 to 48 hours pending progress. Signed By: Gita Villavicencio MD   
 February 15, 2021

## 2021-02-15 NOTE — ED NOTES
Pt placed in hospital bed, labs drawn, urine drawn, and covid test done. All specimens taken to lab. Pt voiced no complaints at this time, will continue to monitor.

## 2021-02-15 NOTE — PROGRESS NOTES
Nutrition consult put on a hold r/t patient is continuing to have difficulties swallowing and noted to having new difficulties swallowing thin liquids. After discussion w/ MD- stated that patient will be NPO until speech sees her to make sure she can safety consume po by mouth.

## 2021-02-15 NOTE — ED NOTES
Spoke with hospitalist Tom, she is aware of elevated troponin and reports she wants to switch the NS to LR at 150ml/hr.

## 2021-02-15 NOTE — ED NOTES
Report taken from [facility]. Pt in room resting quietly, NAD observed at this time. Will continue to monitor.

## 2021-02-15 NOTE — CONSULTS
CONSULTATION 
 
REASON FOR CONSULT:  Elevated troponin REQUESTING PROVIDER:  Wadie Hashimoto, NP 
 
CHIEF COMPLAINT:   
Chief Complaint Patient presents with  Anorexia Failure to thrive HISTORY OF PRESENT ILLNESS:  Oxana Stafford is a [de-identified]y.o. year-old female with past medical history significant for dementia, hypertension, and failure to thrive with poor po intake. She presents to the hospital today with poor appetite and failure to thrive. She is oriented only to herself and is a poor historian secondary to her dementia. Initial assessment in the ED show BAYRON, dehydration, and hypernatremia and positive for a UTI and COVID-19. Her troponins were 0.1 x3 . She was referred for admission for further monitoring and treatment. She is unable to provide any HPI. Records from hospital admission course thus far reviewed. Telemetry Review: sinus rhythm/tachycardia PAST MEDICAL HISTORY:   
Past Medical History:  
Diagnosis Date  COPD (chronic obstructive pulmonary disease) ([facility])  COVID-19  Dementia ([facility] [address]  Hypertension  Hypokalemia HOME MEDICATIONS:   
Prior to Admission Medications Prescriptions Last Dose Informant Patient Reported? Taking?  
acetaminophen (TylenoL) 325 mg tablet   Yes No  
Sig: Take 325 mg by mouth every four (4) hours as needed for Pain. ascorbic acid, vitamin C, (VITAMIN C) 500 mg tablet   No No  
Sig: Take 1 Tab by mouth two (2) times a day. atorvastatin (Lipitor) 20 mg tablet   Yes No  
Sig: Take 20 mg by mouth daily. cholecalciferol (VITAMIN D3) (2,000 UNITS /50 MCG) cap capsule   No No  
Sig: Take 1 Cap by mouth daily. magnesium oxide (MAG-OX) 400 mg tablet   No No  
Sig: Take 1 Tab by mouth two (2) times a day. mirabegron ER (MYRBETRIQ) 50 mg ER tablet   Yes No  
Sig: Take 50 mg by mouth daily. nystatin (MYCOSTATIN) 100,000 unit/mL suspension   No No  
Sig: Take 5 mL by mouth four (4) times daily for 10 days.  swish and spit  
pantoprazole (Protonix) 40 mg tablet   Yes No  
Sig: Take 40 mg by mouth daily. potassium chloride (K-DUR, KLOR-CON) 20 mEq tablet   No No  
Sig: Take 2 Tabs by mouth two (2) times a day. tamsulosin (Flomax) 0.4 mg capsule   Yes No  
Sig: Take 0.4 mg by mouth daily. zinc sulfate (ZINCATE) 220 (50) mg capsule   No No  
Sig: Take 1 Cap by mouth daily. Facility-Administered Medications: None ALLERGIES:   
Allergies Allergen Reactions  Codeine Rash  Ibuprofen Unknown (comments)  Morphine Rash FAMILY HISTORY:  History reviewed. No pertinent family history. SOCIAL HISTORY:   
Tobacco: Unable to assess Drugs: Unable to assess ETOH: Unable to assess REVIEW OF SYSTEMS:  MEL given demented baseline state. Patient Vitals for the past 24 hrs: 
 Temp Pulse Resp BP SpO2  
02/15/21 0918 97.6 °F (36.4 °C) 98 20 109/69 95 % 02/14/21 2037 97.9 °F (36.6 °C) (!) 109 22 114/72 94 % 02/14/21 1719 97.6 °F (36.4 °C) 99 20 109/72 96 % PHYSICAL EXAMINATION:   
General: Frail underweight female with dementia, NAD, A&O to self only, but does follow commands during examination. HEENT: Normocephalic, PERRL, no drainage Neck: Supple, Trachea midline, No JVD RESP: CTA bilaterally with symmetrical chest movement. No SOB or distress. On room air. Cardiovascular: RRR no MRG 
PVS: No rubor, cyanosis, no edema, Radial, DP, PT pulses equal bilaterally ABD: flat , soft NT, Normoactive BS Derm: Warm/Dry/Intact with no lesions, loose turgor Neuro: A&Ox1 PPTS, cranial nerves II- XII grossly intact via interaction with patient. No focal deficits PSYCH: No anxiety. Slight agitation noted. Recent labs results and imaging reviewed. Recent Results (from the past 24 hour(s)) EKG, 12 LEAD, INITIAL Collection Time: 02/14/21  6:25 PM  
Result Value Ref Range Ventricular Rate 106 BPM  
 Atrial Rate 105 BPM  
 P-R Interval 154 ms  QRS Duration 80 ms  
 Q-T Interval 335 ms QTC Calculation (Bezet) 445 ms Calculated P Axis 85 degrees Calculated R Axis 54 degrees Calculated T Axis -107 degrees Diagnosis Sinus tachycardia Probable left atrial enlargement Probable anterior infarct, age indeterminate Abnormal T, consider ischemia, diffuse leads Lateral leads are also involved Confirmed by Maki Buckner [phone]) on 2/15/2021 9:15:49 AM 
  
CBC WITH AUTOMATED DIFF Collection Time: 02/14/21  7:40 PM  
Result Value Ref Range WBC 11.7 (H) 4.4 - 11.3 K/uL  
 RBC 5.83 4.50 - 5.90 M/uL  
 HGB 17.1 13.5 - 17.5 g/dL HCT 52.2 (H) 36 - 46 % MCV 89.5 80 - 100 FL  
 MCH 29.3 (L) 31 - 34 PG  
 MCHC 32.8 31.0 - 36.0 g/dL  
 RDW 17.7 (H) 11.5 - 14.5 % PLATELET 82 K/uL MPV 9.3 6.5 - 11.5 FL  
 NRBC 0.4  WBC ABSOLUTE NRBC 0.04 K/uL NEUTROPHILS 86 (H) 42 - 75 % LYMPHOCYTES 10 (L) 20.5 - 51.1 % MONOCYTES 4 1.7 - 9.3 % EOSINOPHILS 0 (L) 0.9 - 2.9 % BASOPHILS 0 0.0 - 2.5 % ABS. NEUTROPHILS 10.0 (H) 1.8 - 7.7 K/UL  
 ABS. LYMPHOCYTES 1.1 1.0 - 4.8 K/UL  
 ABS. MONOCYTES 0.4 0.2 - 2.4 K/UL  
 ABS. EOSINOPHILS 0.0 0.0 - 0.7 K/UL  
 ABS. BASOPHILS 0.0 0.0 - 0.2 K/UL METABOLIC PANEL, COMPREHENSIVE Collection Time: 02/14/21  7:40 PM  
Result Value Ref Range Sodium 158 (H) 136 - 145 mmol/L Potassium 3.5 3.5 - 5.1 mmol/L Chloride 115 (H) 97 - 108 mmol/L  
 CO2 31 21 - 32 mmol/L Anion gap 12 5 - 15 mmol/L Glucose 119 (H) 65 - 100 mg/dL BUN 52 (H) 6 - 20 mg/dL Creatinine 1.49 (H) 0.55 - 1.02 mg/dL BUN/Creatinine ratio 35 (H) 12 - 20 GFR est AA 41 (L) >60 ml/min/1.73m2 GFR est non-AA 34 (L) >60 ml/min/1.73m2 Calcium 9.7 8.5 - 10.1 mg/dL Bilirubin, total 2.3 (H) 0.2 - 1.0 mg/dL AST (SGOT) 52 (H) 15 - 37 U/L  
 ALT (SGPT) 24 12 - 78 U/L Alk. phosphatase 99 45 - 117 U/L Protein, total 7.2 6.4 - 8.2 g/dL Albumin 3.2 (L) 3.5 - 5.0 g/dL Globulin 4.0 2.0 - 4.0 g/dL  A-G Ratio 0.8 (L) 1.1 - 2. 2 TROPONIN I Collection Time: 02/14/21  7:40 PM  
Result Value Ref Range Troponin-I, Qt. 0.14 (H) <0.05 ng/mL SARS-COV-2 Collection Time: 02/15/21  2:15 AM  
Result Value Ref Range SARS-CoV-2 Nasopharyngeal    
TROPONIN I Collection Time: 02/15/21  2:15 AM  
Result Value Ref Range Troponin-I, Qt. 0.13 (H) <0.05 ng/mL COVID-19 RAPID TEST Collection Time: 02/15/21  2:15 AM  
Result Value Ref Range Specimen source Nasopharyngeal    
 COVID-19 rapid test DETECTED (A) Not Detected METABOLIC PANEL, BASIC Collection Time: 02/15/21  5:30 AM  
Result Value Ref Range Sodium 160 (H) 136 - 145 mmol/L Potassium 2.7 (LL) 3.5 - 5.1 mmol/L Chloride 118 (H) 97 - 108 mmol/L  
 CO2 33 (H) 21 - 32 mmol/L Anion gap 9 5 - 15 mmol/L Glucose 125 (H) 65 - 100 mg/dL BUN 53 (H) 6 - 20 mg/dL Creatinine 1.51 (H) 0.55 - 1.02 mg/dL BUN/Creatinine ratio 35 (H) 12 - 20 GFR est AA 40 (L) >60 ml/min/1.73m2 GFR est non-AA 33 (L) >60 ml/min/1.73m2 Calcium 9.5 8.5 - 10.1 mg/dL CBC WITH AUTOMATED DIFF Collection Time: 02/15/21  5:30 AM  
Result Value Ref Range WBC 9.7 4.4 - 11.3 K/uL  
 RBC 4.80 4.50 - 5.90 M/uL  
 HGB 14.2 13.5 - 17.5 g/dL HCT 42.7 36 - 46 % MCV 89.1 80 - 100 FL  
 MCH 29.5 (L) 31 - 34 PG  
 MCHC 33.2 31.0 - 36.0 g/dL  
 RDW 17.6 (H) 11.5 - 14.5 % PLATELET 59 K/uL MPV 8.8 6.5 - 11.5 FL  
 NRBC 0.4  WBC ABSOLUTE NRBC 0.04 K/uL IMMATURE GRANULOCYTES PENDING %  
 ABS. IMM. GRANS. PENDING K/UL  
 NEUTROPHILS 86 (H) 42 - 75 % LYMPHOCYTES 10 (L) 20.5 - 51.1 % MONOCYTES 4 1.7 - 9.3 % EOSINOPHILS 0 (L) 0.9 - 2.9 % BASOPHILS 0 0.0 - 2.5 % ABS. NEUTROPHILS 8.3 (H) 1.8 - 7.7 K/UL  
 ABS. LYMPHOCYTES 1.0 1.0 - 4.8 K/UL  
 ABS. MONOCYTES 0.4 0.2 - 2.4 K/UL  
 ABS. EOSINOPHILS 0.0 0.0 - 0.7 K/UL  
 ABS. BASOPHILS 0.0 0.0 - 0.2 K/UL  
TROPONIN I Collection Time: 02/15/21  6:45 AM  
Result Value Ref Range  Troponin-I, Qt. 0.17 (H) <0.05 ng/mL ECHO ADULT COMPLETE Collection Time: 02/15/21  9:38 AM  
Result Value Ref Range LV ED Vol A2C 29.02 mL IVSd 1.37 (A) 0.6 - 0.9 cm LVIDd 2.78 (A) 3.9 - 5.3 cm LVIDs 1.91 cm  
 LVOT d 2.03 cm  
 LVPWd 1.39 (A) 0.6 - 0.9 cm  
 LVOT SV 14.5 mL  
 LVOT SV 14.5 mL  
 BP EF 75.0 55 - 100 percent LV Ejection Fraction MOD 2C 82 percent LV Ejection Fraction MOD 4C 65 percent LV Ejection Fraction MOD 4C 65 percent LV ED Vol BP 14.43 (A) 56 - 104 mL  
 LV ES Vol A2C 2.83 mL  
 LV ES Vol BP 3.60 (A) 19 - 49 mL  
 LVOT Peak Gradient 101.62 mmHg Left Ventricular Outflow Tract Mean Gradient 71.22 mmHg LVOT Peak Velocity 504.03 cm/s LVOT VTI 95.12 cm RVIDd 1.72 cm  
 LA Volume 18.30 22 - 52 mL  
 LA Vol 2C 13.29 (A) 22 - 52 mL  
 LA Vol 4C 22.32 22 - 52 mL Aortic Valve Area by Continuity of Peak Velocity 4.04 cm2 Aortic Valve Area by Continuity of VTI 4.84 cm2 Aortic Valve Area by Continuity of VTI 5.36 cm2 AoV PG 69.72 mmHg Aortic Regurgitant Pressure Half-time 376.06 ms Aortic Valve Systolic Mean Gradient 54.11 mmHg Aortic Valve Systolic Peak Velocity 584.89 cm/s Aortic valve mean velocity 303.51 cm/s AoV VTI 63.91 cm  
 MVA VTI 15.03 cm2 MVA VTI 15.03 cm2  
 MV Mean Gradient 4.57 mmHg Triscuspid Valve Regurgitation Peak Gradient 23.01 mmHg  
 TR Max Velocity 239.85 cm/s Ao Root D 3.18 cm  
 LV Mass .2 67 - 162 g Right Atrial Area 4C 6.0 cm2 RVSP 26.0 mmHg Est. RA Pressure 3.0 mmHg XR Results (maximum last 3): Results from [facility] Encounter encounter on 02/14/21 XR CHEST PORT Impression No acute cardiopulmonary findings. Results from [facility] Encounter encounter on 01/27/21 XR CHEST PORT Impression The heart size is within normal limits. No evidence of focal 
airspace consolidation. No evidence of pulmonary edema. No pneumothorax. Atherosclerotic arterial calcification.   
Results from [facility] Encounter encounter on 01/22/21 XR CHEST PORT Impression Impression: The cardiomediastinal silhouette is appropriate for age, technique, 
and lung expansion. Pulmonary vasculature is not congested. The lungs are 
essentially clear. No effusion or pneumothorax is seen. Current Facility-Administered Medications:  
  potassium chloride SR (KLOR-CON 10) tablet 20 mEq, 20 mEq, Oral, NOW, Carmencita Lucas MD, Stopped at 02/15/21 0924 
  dextrose 5% infusion, 100 mL/hr, IntraVENous, CONTINUOUS, Domo Kellogg MD 
  [START ON 2/16/2021] zinc sulfate (ZINCATE) 220 (50) mg capsule 1 Cap, 1 Cap, Oral, DAILY, Domo Kellogg MD 
  ascorbic acid (vitamin C) (VITAMIN C) tablet 500 mg, 500 mg, Oral, BID WITH MEALS, Domo Kellogg MD 
  sodium chloride (NS) flush 5-40 mL, 5-40 mL, IntraVENous, Q8H, Chrissy Santos NP 
  sodium chloride (NS) flush 5-40 mL, 5-40 mL, IntraVENous, PRN, Chrissy Santos NP, 10 mL at 02/15/21 3296   acetaminophen (TYLENOL) tablet 650 mg, 650 mg, Oral, Q6H PRN **OR** acetaminophen (TYLENOL) suppository 650 mg, 650 mg, Rectal, Q6H PRN, Jessica Santos NP 
  polyethylene glycol (MIRALAX) packet 17 g, 17 g, Oral, DAILY PRN, Jessica Santos NP 
  promethazine (PHENERGAN) tablet 12.5 mg, 12.5 mg, Oral, Q6H PRN **OR** ondansetron (ZOFRAN) injection 4 mg, 4 mg, IntraVENous, Q6H PRN, Chrissy Santos NP 
  atorvastatin (LIPITOR) tablet 20 mg, 20 mg, Oral, QHS, Jessica Santos NP Current Outpatient Medications:  
  potassium chloride (K-DUR, KLOR-CON) 20 mEq tablet, Take 2 Tabs by mouth two (2) times a day., Disp: 120 Tab, Rfl: 0 
  cholecalciferol (VITAMIN D3) (2,000 UNITS /50 MCG) cap capsule, Take 1 Cap by mouth daily. , Disp: 30 Cap, Rfl: 0 
  magnesium oxide (MAG-OX) 400 mg tablet, Take 1 Tab by mouth two (2) times a day., Disp: 60 Tab, Rfl: 0 
  zinc sulfate (ZINCATE) 220 (50) mg capsule, Take 1 Cap by mouth daily. , Disp: 30 Cap, Rfl: 0 
  ascorbic acid, vitamin C, (VITAMIN C) 500 mg tablet, Take 1 Tab by mouth two (2) times a day., Disp: 60 Tab, Rfl: 0 
  acetaminophen (TylenoL) 325 mg tablet, Take 325 mg by mouth every four (4) hours as needed for Pain., Disp: , Rfl:  
  pantoprazole (Protonix) 40 mg tablet, Take 40 mg by mouth daily. , Disp: , Rfl:  
  atorvastatin (Lipitor) 20 mg tablet, Take 20 mg by mouth daily. , Disp: , Rfl:  
  tamsulosin (Flomax) 0.4 mg capsule, Take 0.4 mg by mouth daily. , Disp: , Rfl:  
  mirabegron ER (MYRBETRIQ) 50 mg ER tablet, Take 50 mg by mouth daily. , Disp: , Rfl:  
 
 
 
 
Case discussed with collaborating physician Dr. Sugar Cox and our impression and recommendations are as follows: 1. Elevated troponins- Troponins 0.1 x3 flat are not indicative of ACS. Likely secondary to demand from underlying infectious process. Given advanced age and comorbities, no further workup indicated at this time, continue to treat underlying causes. 2. Murmur- noted on exam. TTE is pending. 3. Hypokalemia- replete potassium intravenously (until speech therapy clears patient for PO intake) to goal of 4.5-5. Would also recommend addition of repletion therapy to maintenance IVF. Recheck BMP in afternoon. 4. Hypertension- BP has been at goal since arrival to the ED. Will continue to monitor. Thank you for involving us in the care of this patient. Please do not hesitate to call if additional questions arise. If after hours please call [phone]

## 2021-02-15 NOTE — ED NOTES
Pt resting quietly in bed, vitals stable. Pt given fluids per providers order. NAD observed at this time, will continue to monitor.

## 2021-02-15 NOTE — ED NOTES
Attempted to give patient water to take oral medication with and after taking sip started coughing. Stopped giving oral fluids. Started Potassium IV as ordered. Will nty hospitalist on rounds.

## 2021-02-15 NOTE — ED NOTES
Pt laying in bed with eyes closed, NAD observed at this time. Even chest rise and fall, will continue to monitor.

## 2021-02-16 NOTE — PROGRESS NOTES
The pt have been repositioned in bed. He brief have been checked & changed as needed. No distress is noted.

## 2021-02-16 NOTE — PROGRESS NOTES
Progress Note Patient: Aakash Villafuerte MRN: [id number]  SSN: [id number] YOB: 1940  Age: [de-identified] y.o. Sex: female Admit Date: 2/14/2021 LOS: 1 day Subjective:  
 
Patient presents from her long-term care facility with concern of poor p.o. intake. Patient is seen alert without distress. Patient cannot be communicated due to her baseline dementia. Objective:  
 
Vitals:  
 02/16/21 0800 02/16/21 0820 02/16/21 1119 02/16/21 1200 BP:  118/74 118/74 Pulse: 83 76 76 75 Resp:   18 Temp:  97.6 °F (36.4 °C) 97.6 °F (36.4 °C) SpO2:  96% 96% Weight:      
  
 
Intake and Output: 
Current Shift: 02/16 0701 - 02/16 1900 In: 100 [I.V.:100] Out: - Last three shifts: 02/14 1901 - 02/16 0700 In: 590 [I.V.:590] Out: - Physical Exam:  
GENERAL: alert, cooperative, no distress, appears stated age THROAT & NECK: normal and no erythema or exudates noted. LUNG: clear to auscultation bilaterally HEART: regular rate and rhythm, S1, S2 normal, no murmur, click, rub or gallop ABDOMEN: soft, non-tender. Bowel sounds normal. No masses,  no organomegaly EXTREMITIES:  extremities normal, atraumatic, no cyanosis or edema SKIN: no rash or abnormalities NEUROLOGIC: Awake and alert, nonfocal 
PSYCHIATRIC: non focal 
 
Lab/Data Review: All lab results for the last 24 hours reviewed. Recent Results (from the past 24 hour(s)) METABOLIC PANEL, BASIC Collection Time: 02/16/21  5:58 AM  
Result Value Ref Range Sodium 157 (H) 136 - 145 mmol/L Potassium 2.8 (L) 3.5 - 5.1 mmol/L Chloride 116 (H) 97 - 108 mmol/L  
 CO2 33 (H) 21 - 32 mmol/L Anion gap 8 5 - 15 mmol/L Glucose 146 (H) 65 - 100 mg/dL BUN 44 (H) 6 - 20 mg/dL Creatinine 1.28 (H) 0.55 - 1.02 mg/dL BUN/Creatinine ratio 34 (H) 12 - 20 GFR est AA 49 (L) >60 ml/min/1.73m2 GFR est non-AA 40 (L) >60 ml/min/1.73m2 Calcium 8.8 8.5 - 10.1 mg/dL Imaging: No results found.   
 
 
Assessment and Plan: BAYRON 
-Likely from prerenal cause from poor p.o. intake 
-Continue IV fluid, will change to D5 due to hyponatremia 
-Monitor renal function Hypernatremia 
-Likely from free water depletion with poor p.o. intake 
-Continue IV fluid with D5W 
-Monitor sodium level Hypokalemia 
-Also due to poor p.o. intake 
-Replace potassium Elevated troponin 
-Cardiology evaluating the patient 
-Patient with severe aortic stenosis, normal EF 
-Further work-up per cardiology Hypertension 
-Holding antihypertensives for now 
-Continue monitor blood pressure COVID-19 infection 
-Patient is not hypoxic and therefore does not meet criteria for remdesivir therapy 
-Chest x-ray without any acute finding 
-Continue vitamin C and zinc 
 
Dyslipidemia 
-Continue statin Dementia 
-Supportive care 
-Speech therapy to evaluate Anticipated disposition is 24 to 48 hours pending progress. Signed By: Bhupinder Caraballo MD   
 February 16, 2021

## 2021-02-16 NOTE — PROGRESS NOTES
Progress Note Patient: Eran Salomon MRN: [id number]  SSN: [id number] YOB: 1940  Age: [de-identified] y.o. Sex: female Admit Date: 2/14/2021 LOS: 1 day Subjective:  
 
Eran Salomon is a [de-identified]y.o. year-old female with past medical history significant for dementia, hypertension, and failure to thrive who is followed for elevated troponin  In setting of severe UTI and COVID-19 infection. No changes in reports from staff and chart review. Remains stable. Telemetry Review: SR, rates 70s Review of Symptoms: MEL detailed ROS given Demented baseline. Objective:  
 
Vitals:  
 02/16/21 0400 02/16/21 0508 02/16/21 0800 02/16/21 0820 BP:  120/74  118/74 Pulse: 87 87 83 76 Resp:  18 Temp:  97.5 °F (36.4 °C)  97.6 °F (36.4 °C) SpO2:  97%  96% Weight:      
  
 
Intake and Output: 
Current Shift: 02/16 0701 - 02/16 1900 In: 100 [I.V.:100] Out: - Last three shifts: 02/14 1901 - 02/16 0700 In: 590 [I.V.:590] Out: - Physical Exam:  
Frail demented female @ baseline SR on Telemetry, /74, HR 87 On RA Patient is COVID Positive - so full exam not completed in order to conserve PPE during national pandemic. In depth chart review completed and case discussed in detail with HM team, RT team, and Nursing staff. Lab/Data Review: 
BMP:  
Lab Results Component Value Date/Time   (H) 02/16/2021 05:58 AM  
 K 2.8 (L) 02/16/2021 05:58 AM  
  (H) 02/16/2021 05:58 AM  
 CO2 33 (H) 02/16/2021 05:58 AM  
 AGAP 8 02/16/2021 05:58 AM  
  (H) 02/16/2021 05:58 AM  
 BUN 44 (H) 02/16/2021 05:58 AM  
 CREA 1.28 (H) 02/16/2021 05:58 AM  
 GFRAA 49 (L) 02/16/2021 05:58 AM  
 GFRNA 40 (L) 02/16/2021 05:58 AM  
  
 
 
 
Current Facility-Administered Medications:  
  potassium chloride 10 mEq in 100 ml IVPB, 10 mEq, IntraVENous, Q1H, Theresa Tellez, NP, Last Rate: 100 mL/hr at 02/16/21 1016, 10 mEq at 02/16/21 1016 
  dextrose 5% infusion, 100 mL/hr, IntraVENous, CONTINUOUS, Domo Kellogg MD, Last Rate: 100 mL/hr at 02/16/21 0815, 100 mL/hr at 02/16/21 0815   zinc sulfate (ZINCATE) 220 (50) mg capsule 1 Cap, 1 Cap, Oral, DAILY, [place] Kellogg MD, Stopped at 02/16/21 0900 
  ascorbic acid (vitamin C) (VITAMIN C) tablet 500 mg, 500 mg, Oral, BID WITH MEALS, Domo Kellogg MD, Stopped at 02/15/21 1700 
  sodium chloride (NS) flush 5-40 mL, 5-40 mL, IntraVENous, Q8H, Chrissy Santos NP, 10 mL at 02/16/21 0620 
  sodium chloride (NS) flush 5-40 mL, 5-40 mL, IntraVENous, PRN, Chrissy Santos NP, 10 mL at 02/15/21 2932   acetaminophen (TYLENOL) tablet 650 mg, 650 mg, Oral, Q6H PRN **OR** acetaminophen (TYLENOL) suppository 650 mg, 650 mg, Rectal, Q6H PRN, Jonathon Santos NP 
  polyethylene glycol (MIRALAX) packet 17 g, 17 g, Oral, DAILY PRN, Jonathon Santos NP 
  promethazine (PHENERGAN) tablet 12.5 mg, 12.5 mg, Oral, Q6H PRN **OR** ondansetron (ZOFRAN) injection 4 mg, 4 mg, IntraVENous, Q6H PRN, Chrissy Santos NP 
  atorvastatin (LIPITOR) tablet 20 mg, 20 mg, Oral, QHS, Jonathon Santos NP, Stopped at 02/15/21 2200 Assessment:  
 
Active Problems: 
  Dehydration (1/28/2021) Hypernatremia (1/28/2021) BAYRON (acute kidney injury) ([facility] [address] (2/14/2021) Anorexia (2/15/2021) Plan:  
 
Case discussed with Collaborating physician Dr. Lorraine Quijano and our recommendations are as follows: 1. Elevated troponins- Troponins 0.1 x3 flat are not indicative of ACS. Likely secondary to demand from underlying infectious process. Given advanced age and comorbities, no further workup indicated at this time, continue to treat underlying causes. 2. Severe Aortic Stenosis - given advanced age, dementia, and co-morbidities patient is not a candidate for intervention or invasive treatment. Continue medical management. Avoid vasodilators or Calcium Channel blockers. Life expectancy <2 years statistically.   
3. Hypokalemia- replete potassium intravenously (until speech therapy clears patient for PO intake) to goal of 4.5-5. Would also recommend addition of repletion therapy to maintenance IVF. Recheck BMP in afternoon. 4. Hypertension- BP has been at goal since arrival to the ED. Will continue to monitor. 5. Moderate outflow tract obstruction  With hyperdynamic function - possibly s/t dehydration from failure to thrive/underlying infection/covid-19. However, Obstructive Cardiomyopathy is possible. Would continue to rehydrate patient and treat medically. Can consider repeat Echo if indicated. 6. Hypernatremia - consider Nephrology consult. Thank you for involving us in the care of this patient. Please do not hesitate to call if additional questions arise. If after hours please call [phone] Signed By: Noel Burch NP February 16, 2021

## 2021-02-16 NOTE — PROGRESS NOTES
SPEECH LANGUAGE PATHOLOGY BEDSIDE SWALLOW EVALUATIONS Patient: Froylan Perla (65 y.o. female) Date: 2/16/2021 Primary Diagnosis: BAYRON (acute kidney injury) ([facility] [address] [N17.9] Dehydration [E86.0] Hypernatremia [E87.0] Anorexia [R63.0] Precautions: Aspiration ASSESSMENT : 
Patient on room air. O2 sats remained between 97% - 98% throughout eval. 
Based on the objective data described below, the patient presents with mod oropharyngeal dysphagia. Oral phase c/b appropriate bolus acceptance of first presentation and limited bolus manipulation. Pharyngeal phase c/b delayed swallow initiation. HLE reduced, but functional to palpation. Overt s/s of pen/aspiration c/b cough following ice chip presentation. Patient refused all additional presentations of ice chips, thin liquid, and NTL despite max encouragement. Patient will benefit from skilled intervention to address the above impairments. Patients rehabilitation potential is considered to be Fair PLAN : 
Recommendations and Planned Interventions: 
Recommend continue NPO. Recommend continue PO trials with clinician for diet upgrade. Frequency/Duration: Patient will be followed by speech-language pathology 1-3X/week to address goals. Discharge Recommendations: To Be Determined SUBJECTIVE:  
Patient is agreeable to beginning eval 
 
OBJECTIVE:  
 
CXR Results  (Last 48 hours) 02/14/21 1800  XR CHEST PORT Final result Impression:  No acute cardiopulmonary findings. Narrative:  Study: Chest radiograph(s), 1 view Clinical Indication: Failure to thrive. Comparison: Chest radiograph dated 1/27/2021. Findings: The cardiac silhouette is normal and unchanged in size. Overlying monitoring  
leads. Lung volumes are maintained. No focal consolidation, large pleural effusion, or  
discernible pneumothorax. Past Medical History:  
Diagnosis Date  COPD (chronic obstructive pulmonary disease) ([facility] [address]  COVID-19  [facility]  Hypertension  Hypokalemia History reviewed. No pertinent surgical history. Prior Level of Function/Home Situation:  
Home Situation Home Environment: Long term care # Steps to Enter: 0 One/Two Story Residence: Other (Comment)(1 level) Interior Rails: None Lift Chair Available: No 
Living Alone: No 
Support Systems: Skilled nursing facility Patient Expects to be Discharged to[de-identified] Skilled nursing facility Current DME Used/Available at Home: Wheelchair Current Diet: NPO Cognitive and Communication Status: 
Neurologic State: Alert, Confused Orientation Level: Oriented to person, Disoriented to time, Disoriented to situation, Disoriented to place Cognition: Decreased attention/concentration, Decreased command following Swallowing Evaluation:  
Oral Assessment: 
Oral Assessment Dentition: Edentulous Oral Hygiene: (Poor) P.O. Trials: 
Patient Position: (HOB raised) Vocal quality prior to P.O.: (Unable to assess) Consistency Presented: Ice chips How Presented: SLP-fed/presented;Spoon How Much: (1 trial) Bolus Acceptance: No impairment Bolus Formation/Control: No impairment Initiation of Swallow: Delayed (# of seconds) Laryngeal Elevation: Decreased;Weak;Functional 
Aspiration Signs/Symptoms: Weak cough Oral Phase Severity: Moderate Pharyngeal Phase Severity : Moderate Voice: 
Vocal Quality: (Unable to assess) Pain: patient did not exhibit s/s of pain/discomfort throughout eval 
 
After treatment:  
Patient left in no apparent distress in bed, Call bell within reach, and Nursing notified COMMUNICATION/EDUCATION:  
Patient was educated regarding purpose of SLP assessment, POC, diet recs and sw safety precautions. Patient demonstrated Guarded understanding as evidenced by AMS.  
 
The patient's plan of care including recommendations, planned interventions, and recommended diet changes were discussed with: Registered nurse and Physician. Patient is unable to participate in goal setting and plan of care. Problem: Dysphagia (Adult) Goal: *Acute Goals and Plan of Care (Insert Text) Description: Speech Therapy Goals Initiated 2/16/2021 
-Patient will participate in modified barium swallow study within 7 day(s) if medically indicated [ ] Not met  [ ]  MET   [ ] Progressing  [ ] Shilpa Rose 
-Patient will tolerate PO trials with clinician without signs/symptoms of aspiration within 7 day(s). [ ] Not met  [ ]  MET   [ ] Progressing  [ ] Shilpa Rose 
-Patient will demonstrate understanding of swallow safety precautions and aspiration precautions, diet recs with moderate-max cues within 7 day(s). [ ] Not met  [ ]  MET   [ ] Sheela Cage  [ ] Shilpa Rose Outcome: Not Met Thank you for this referral. 
Robert Julio M.S. [id number] [facility] Time Calculation: 17 mins

## 2021-02-16 NOTE — PROGRESS NOTES
Report received from the offgoing nurse. The pt is resting in bed with no distress noted at this time.

## 2021-02-17 NOTE — PROGRESS NOTES
The Iv was restarted in the rt forearm with # 20 abbocath. Iv was resumed. She also have a site #20 to the lt antecubital that is hep locked. She is uncooperative with trying to do mouth care.

## 2021-02-17 NOTE — PROGRESS NOTES
Progress Note Patient: Guy Almaraz MRN: [id number]  SSN: [id number] YOB: 1940  Age: [de-identified] y.o. Sex: female Admit Date: 2/14/2021 LOS: 2 days Subjective:  
 
Patient presents from her long-term care facility with concern of poor p.o. intake. Patient is seen alert without distress. Patient cannot be communicated due to her baseline dementia. Objective:  
 
Vitals:  
 02/17/21 8371 02/17/21 2497 02/17/21 0429 02/17/21 3333 BP: 118/84 134/70 134/70 (!) 122/57 Pulse: 78 67 67 (!) 58 Resp: 18 18 18 20 Temp: 97.5 °F (36.4 °C) 97.7 °F (36.5 °C) 97.7 °F (36.5 °C) (!) 96.4 °F (35.8 °C) SpO2: 96% 96%  100% Weight:      
  
 
Intake and Output: 
Current Shift: No intake/output data recorded. Last three shifts: 02/15 1901 - 02/17 0700 In: 2610 [I.V.:1475] Out: - Physical Exam:  
GENERAL: alert, cooperative, no distress, appears stated age THROAT & NECK: normal and no erythema or exudates noted. LUNG: clear to auscultation bilaterally HEART: regular rate and rhythm, S1, S2 normal, no murmur, click, rub or gallop ABDOMEN: soft, non-tender. Bowel sounds normal. No masses,  no organomegaly EXTREMITIES:  extremities normal, atraumatic, no cyanosis or edema SKIN: no rash or abnormalities NEUROLOGIC: Awake and alert, nonfocal 
PSYCHIATRIC: non focal 
 
Lab/Data Review: All lab results for the last 24 hours reviewed. Recent Results (from the past 24 hour(s)) METABOLIC PANEL, BASIC Collection Time: 02/16/21  7:40 PM  
Result Value Ref Range Sodium 152 (H) 136 - 145 mmol/L Potassium 4.3 3.5 - 5.1 mmol/L Chloride 112 (H) 97 - 108 mmol/L  
 CO2 34 (H) 21 - 32 mmol/L Anion gap 6 5 - 15 mmol/L Glucose 107 (H) 65 - 100 mg/dL BUN 36 (H) 6 - 20 mg/dL Creatinine 1.12 (H) 0.55 - 1.02 mg/dL BUN/Creatinine ratio 32 (H) 12 - 20 GFR est AA 57 (L) >60 ml/min/1.73m2 GFR est non-AA 47 (L) >60 ml/min/1.73m2 Calcium 9.0 8.5 - 10.1 mg/dL METABOLIC PANEL, BASIC Collection Time: 02/17/21  6:16 AM  
Result Value Ref Range Sodium 147 (H) 136 - 145 mmol/L Potassium 4.0 3.5 - 5.1 mmol/L Chloride 110 (H) 97 - 108 mmol/L  
 CO2 32 21 - 32 mmol/L Anion gap 5 5 - 15 mmol/L Glucose 135 (H) 65 - 100 mg/dL BUN 29 (H) 6 - 20 mg/dL Creatinine 1.02 0.55 - 1.02 mg/dL BUN/Creatinine ratio 28 (H) 12 - 20 GFR est AA >60 >60 ml/min/1.73m2 GFR est non-AA 52 (L) >60 ml/min/1.73m2 Calcium 9.0 8.5 - 10.1 mg/dL Imaging: No results found. Assessment and Plan: BAYRON 
-Likely from prerenal cause from poor p.o. intake 
-Continue IV fluid, will change to D5 due to hyponatremia 
-Monitor renal function Hypernatremia 
-Likely from free water depletion with poor p.o. intake 
-Continue IV fluid with D5W 
-Monitor sodium level Hypokalemia 
-Also due to poor p.o. intake 
-Replace potassium Elevated troponin 
-Cardiology evaluating the patient 
-Patient with severe aortic stenosis, normal EF 
-Further work-up per cardiology Hypertension 
-Holding antihypertensives for now 
-Continue monitor blood pressure COVID-19 infection 
-Patient is not hypoxic and therefore does not meet criteria for remdesivir therapy 
-Chest x-ray without any acute finding 
-Continue vitamin C and zinc 
 
Dyslipidemia 
-Continue statin Dementia 
-Supportive care 
-Speech therapy to evaluate Anticipated disposition is 24 to 48 hours pending progress. Signed By: Todd Figueroa MD   
 February 17, 2021

## 2021-02-17 NOTE — PROGRESS NOTES
Problem: Falls - Risk of 
Goal: *Absence of Falls Description: Document Musa Lima Fall Risk and appropriate interventions in the flowsheet. Outcome: Progressing Towards Goal 
Note: Fall Risk Interventions: 
Mobility Interventions: PT Consult for mobility concerns, Utilize walker, cane, or other assistive device Mentation Interventions: More frequent rounding, Reorient patient Medication Interventions: Teach patient to arise slowly Elimination Interventions: Patient to call for help with toileting needs, Toileting schedule/hourly rounds, Call light in reach Problem: Patient Education: Go to Patient Education Activity Goal: Patient/Family Education Outcome: Progressing Towards Goal 
  
Problem: Pressure Injury - Risk of 
Goal: *Prevention of pressure injury Description: Document Marcelino Scale and appropriate interventions in the flowsheet. Outcome: Progressing Towards Goal 
Note: Pressure Injury Interventions: 
Sensory Interventions: Avoid rigorous massage over bony prominences, Maintain/enhance activity level, Keep linens dry and wrinkle-free, Minimize linen layers, Turn and reposition approx. every two hours (pillows and wedges if needed) Moisture Interventions: Apply protective barrier, creams and emollients, Limit adult briefs, Minimize layers Activity Interventions: Increase time out of bed Mobility Interventions: Turn and reposition approx. every two hours(pillow and wedges) Nutrition Interventions: Document food/fluid/supplement intake Friction and Shear Interventions: Apply protective barrier, creams and emollients, Minimize layers Problem: Patient Education: Go to Patient Education Activity Goal: Patient/Family Education Outcome: Progressing Towards Goal 
  
Problem: Patient Education: Go to Patient Education Activity Goal: Patient/Family Education Outcome: Progressing Towards Goal

## 2021-02-17 NOTE — PROGRESS NOTES
Progress Note Patient: Ledy Roberts MRN: [id number]  SSN: [id number] YOB: 1940  Age: [de-identified] y.o. Sex: female Admit Date: 2/14/2021 LOS: 2 days Subjective:  
 
Ledy Roberts is a [de-identified]y.o. year-old female with past medical history significant for dementia, hypertension, and failure to thrive who is followed for elevated troponin in setting of severe UTI and COVID-19 infection. No changes in reports from staff and chart review. Remains stable. Telemetry Review: SR, occasional PAC/PVC Review of Symptoms: MEL detailed ROS given Demented baseline. Objective:  
 
Vitals:  
 02/17/21 7893 02/17/21 6128 02/17/21 0429 02/17/21 0704 BP: 118/84 134/70 134/70 (!) 122/57 Pulse: 78 67 67 (!) 58 Resp: 18 18 18 20 Temp: 97.5 °F (36.4 °C) 97.7 °F (36.5 °C) 97.7 °F (36.5 °C) (!) 96.4 °F (35.8 °C) SpO2: 96% 96%  100% Weight:      
  
 
Intake and Output: 
Current Shift: No intake/output data recorded. Last three shifts: 02/15 1901 - 02/17 0700 In: 4437 [I.V.:1475] Out: - Physical Exam:  
Frail demented female @ baseline SR on Telemetry, /84, HR 78 On RA Patient is COVID Positive - so full exam not completed in order to conserve PPE during national pandemic. In depth chart review completed and case discussed in detail with HM team, RT team, and Nursing staff. Lab/Data Review: 
BMP:  
Lab Results Component Value Date/Time  (H) 02/17/2021 06:16 AM  
 K 4.0 02/17/2021 06:16 AM  
  (H) 02/17/2021 06:16 AM  
 CO2 32 02/17/2021 06:16 AM  
 AGAP 5 02/17/2021 06:16 AM  
  (H) 02/17/2021 06:16 AM  
 BUN 29 (H) 02/17/2021 06:16 AM  
 CREA 1.02 02/17/2021 06:16 AM  
 GFRAA >60 02/17/2021 06:16 AM  
 GFRNA 52 (L) 02/17/2021 06:16 AM  
  
 
 
 
Current Facility-Administered Medications:  
  dextrose 5% infusion, 100 mL/hr, IntraVENous, CONTINUOUS, Domo Kellogg MD, Last Rate: 100 mL/hr at 02/17/21 0119, 100 mL/hr at 02/17/21 0119   zinc sulfate (ZINCATE) 220 (50) mg capsule 1 Cap, 1 Cap, Oral, DAILY, Shyanne Kellogg MD, Stopped at 02/16/21 0900 
  ascorbic acid (vitamin C) (VITAMIN C) tablet 500 mg, 500 mg, Oral, BID WITH MEALS, Domo Kellogg MD, Stopped at 02/15/21 1700 
  sodium chloride (NS) flush 5-40 mL, 5-40 mL, IntraVENous, Q8H, Chrissy Santos NP, 10 mL at 02/17/21 3272   sodium chloride (NS) flush 5-40 mL, 5-40 mL, IntraVENous, PRN, Chrissy Santos NP, 10 mL at 02/15/21 5394   acetaminophen (TYLENOL) tablet 650 mg, 650 mg, Oral, Q6H PRN **OR** acetaminophen (TYLENOL) suppository 650 mg, 650 mg, Rectal, Q6H PRN, Andi Santos NP 
  polyethylene glycol (MIRALAX) packet 17 g, 17 g, Oral, DAILY PRN, Andi Santos NP 
  promethazine (PHENERGAN) tablet 12.5 mg, 12.5 mg, Oral, Q6H PRN **OR** ondansetron (ZOFRAN) injection 4 mg, 4 mg, IntraVENous, Q6H PRN, Chrissy Santos NP 
  atorvastatin (LIPITOR) tablet 20 mg, 20 mg, Oral, QHS, Andi Santos NP, Stopped at 02/15/21 2200 Assessment:  
 
Active Problems: 
  Dehydration (1/28/2021) Hypernatremia (1/28/2021) BAYRON (acute kidney injury) ([facility] [address] (2/14/2021) Anorexia (2/15/2021) Plan:  
 
Case discussed with Collaborating physician Dr. Henrique Britton and our recommendations are as follows: 1. Elevated troponins- Troponins 0.1 x3 flat are not indicative of ACS. Likely secondary to demand from underlying infectious process. Given advanced age and comorbities, no further workup indicated at this time, continue to treat underlying causes. 2. Severe Aortic Stenosis - given advanced age, dementia, and co-morbidities patient is not a candidate for intervention or invasive treatment. Continue medical management. Avoid vasodilators or Calcium Channel blockers. Life expectancy <2 years statistically. 3. Hypokalemia- replete potassium intravenously (until speech therapy clears patient for PO intake) to goal of 4.5-5.  Would also recommend addition of repletion therapy to maintenance IVF. Recheck BMP in afternoon. 4. Hypertension- BP has been at goal since arrival to the ED. Will continue to monitor. 5. Moderate outflow tract obstruction  With hyperdynamic function - possibly s/t dehydration from failure to thrive/underlying infection/covid-19. However, Obstructive Cardiomyopathy is possible. Would continue to rehydrate patient and treat medically. Can consider repeat Echo if indicated. 6. Hypernatremia - significantly improved. Further per HM team.   
 
 
Thank you for involving us in the care of this patient. Please do not hesitate to call if additional questions arise. If after hours please call [phone] Signed By: Sin Castanon NP February 17, 2021

## 2021-02-17 NOTE — PROGRESS NOTES
After discussion w/ MD during rounding- will continue to hold off on nutrition consult at this time r/t still unsafe to consume to po by mouth. Will continue to follow as a NPO >5days. Will need a new nutrition consult when appropriate for po intake.

## 2021-02-18 NOTE — PROGRESS NOTES
Visit attempted for patient in 2 acute care V Rustam Allen during rounds. Per current covid-19 isolation protocol in effect, I provided silent support and prayer outside patient room. I called her to provide support but she did not answer the phone. No family members were present. I called and left a message with her relative Guillermo Sherwood in attempt to provide family support via telechaplaincy. Chaplains will follow up if further referrals are requested. Chaplain Everett Nicholas M.Div.  can be reached by calling the  at [facility] 
[phone]

## 2021-02-18 NOTE — PROGRESS NOTES
Progress Note Patient: Guy Almaraz MRN: [id number]  SSN: [id number] YOB: 1940  Age: [de-identified] y.o. Sex: female Admit Date: 2/14/2021 LOS: 3 days Subjective:  
 
Guy Almaraz is a [de-identified]y.o. year-old female with past medical history significant for dementia, hypertension, and failure to thrive who is followed for elevated troponin in setting of severe UTI and COVID-19 infection. Hypotension noted yesterday afternoon/evening. BP up to 114/59 this morning. More alert today per note reviews. Telemetry Review: SR, occasional PAC/PVC Review of Symptoms: MEL detailed ROS given Demented baseline. Objective:  
 
Vitals:  
 02/17/21 1947 02/18/21 0037 02/18/21 6147 02/18/21 9933 BP: (!) 94/46 (!) 107/59 104/60 (!) 114/59 Pulse: 68 64 68 65 Resp: 20 18 18 18 Temp: 97.9 °F (36.6 °C) 97.7 °F (36.5 °C) 97.5 °F (36.4 °C) 98.3 °F (36.8 °C) SpO2: 99% 98% 95% 97% Weight:      
  
 
Intake and Output: 
Current Shift: No intake/output data recorded. Last three shifts: No intake/output data recorded. Physical Exam:  
Frail demented female @ baseline SR on Telemetry, /59, HR 65 Patient is COVID Positive - so full exam not completed in order to conserve PPE during national pandemic. In depth chart review completed and case discussed in detail with HM team, RT team, and Nursing staff. Lab/Data Review: 
BMP:  
No results found for: NA, K, CL, CO2, AGAP, GLU, BUN, CREA, GFRAA, GFRNA Current Facility-Administered Medications:  
  dextrose 5% infusion, 100 mL/hr, IntraVENous, CONTINUOUS, Domo Kellogg MD, Last Rate: 100 mL/hr at 02/17/21 1537, 100 mL/hr at 02/17/21 1537 
  zinc sulfate (ZINCATE) 220 (50) mg capsule 1 Cap, 1 Cap, Oral, DAILY, Domo Kellogg MD, Stopped at 02/16/21 0900 
  ascorbic acid (vitamin C) (VITAMIN C) tablet 500 mg, 500 mg, Oral, BID WITH MEALS, Domo Kellogg MD, Stopped at 02/15/21 1700 
  sodium chloride (NS) flush 5-40 mL, 5-40 mL, IntraVENous, Q8H, Chrissy Santos, NP, 10 mL at 02/17/21 2154   sodium chloride (NS) flush 5-40 mL, 5-40 mL, IntraVENous, PRN, [place], NP, 10 mL at 02/15/21 6763   acetaminophen (TYLENOL) tablet 650 mg, 650 mg, Oral, Q6H PRN **OR** acetaminophen (TYLENOL) suppository 650 mg, 650 mg, Rectal, Q6H PRN, [address], NP 
  polyethylene glycol (MIRALAX) packet 17 g, 17 g, Oral, DAILY PRN, [address], NP 
  promethazine (PHENERGAN) tablet 12.5 mg, 12.5 mg, Oral, Q6H PRN **OR** ondansetron (ZOFRAN) injection 4 mg, 4 mg, IntraVENous, Q6H PRN, [place] Chrissy D, NP 
  atorvastatin (LIPITOR) tablet 20 mg, 20 mg, Oral, QHS, [address], NP, Stopped at 02/15/21 2200 Assessment:  
 
Active Problems: 
  Dehydration (1/28/2021) Hypernatremia (1/28/2021) BAYRON (acute kidney injury) ([facility] [address] (2/14/2021) Anorexia (2/15/2021) Plan:  
 
Case discussed with Collaborating physician Dr. Mandy Zurita and our recommendations are as follows: 1. Elevated troponins- Troponins 0.1 x3 flat are not indicative of ACS. Likely secondary to demand from underlying infectious process. Given advanced age and comorbities, no further workup indicated at this time, continue to treat underlying causes. 2. Severe Aortic Stenosis - given advanced age, dementia, and co-morbidities patient is not a candidate for intervention or invasive treatment. Continue medical management. Avoid vasodilators or Calcium Channel blockers. Life expectancy <2 years statistically. 3. Hypokalemia- replete potassium intravenously (until speech therapy clears patient for PO intake) to goal of 4.5-5. Would also recommend addition of repletion therapy to maintenance IVF. Recheck BMP in afternoon. 4. Hypertension- BP has been at goal since arrival to the ED. Will continue to monitor.  
5. Moderate outflow tract obstruction  With hyperdynamic function - possibly s/t dehydration from failure to thrive/underlying infection/covid-19. However, Obstructive Cardiomyopathy is possible. Would continue to rehydrate patient and treat medically. Can consider repeat Echo if indicated. 6. Hypernatremia - significantly improved. Further per HM team.   
 
Will sign off at this time. If questions/concerns please do not hesitate to call or reconsult. Thank you for involving us in the care of this patient. Please do not hesitate to call if additional questions arise. If after hours please call [phone] Signed By: Ananda Shearer NP February 18, 2021

## 2021-02-18 NOTE — PROGRESS NOTES
Comprehensive Nutrition Assessment Type and Reason for Visit: Positive nutrition screen, Initial 
 
Nutrition Recommendations/Plan:  
Upgrade diet to puree diet w/o swallowing difficulties x1/day w/ ensure enlive x3/day & magic cup x2/day Obtain an ABW to better assess patient current nutrition status Will continue to follow as a high level nutrition care Nutrition Assessment:  [de-identified]year old female from nursing home for poor appetite and intake for several days. Covid +. Patient was recently admitted x2wks noted to prefer liquids over solid foods. Previously on a [facility] soft diet w/ ensure enlive x3/day. Currently being followed by SLP- which recommended NPO (2/16) r/t altered mental status. Patient noted to be more awake today. After dicussion w/ MD & Pt's RN, will asses swallow w/ magic cup & ensure before upgrading to a puree diet. Nutrition related lab value: na (147), glucose(135), Bun(29), GFR (52). IV solution: cont. D5W 100ml/hr. Nutrition supplements: vitamin C (500mg), zinc (50mg). Malnutrition Assessment: 
Malnutrition Status:  Insufficient data(unable to perfom NFPE due to precautions of covid 19) Context:  Chronic illness Estimated Daily Nutrient Needs: 
Energy (kcal): 1,300kcals (1,053. 75BMR x 1.2); Weight Used for Energy Requirements:   
Protein (g): 56-67g (1.0-1.2g/kg); Weight Used for Protein Requirements:   
Fluid (ml/day): 1,400-1,680ml (25-30ml/kg); Method Used for Fluid Requirements: ml/kg Nutrition Related Findings:  No reported n/v, c/d. abdomen: soft, non-tender. Currently bedbound, unable to assess for muscle or fat wasting. Wounds:   
Stage I(sacrum) Current Nutrition Therapies: DIET NPO Anthropometric Measures: 
· Height:  5'6\" · Current Body Wt:  56.7 kg (125 lb) · BMI Category:  Underweight (BMI less than 22) age over 72 Nutrition Diagnosis:  
· Inadequate oral intake related to cognitive or neurological impairment as evidenced by NPO or clear liquid status due to medical condition Nutrition Interventions:  
Food and/or Nutrient Delivery: Start oral diet, Continue NPO(upgrade to puree diet x1/day) Nutrition Education and Counseling: No recommendations at this time Coordination of Nutrition Care: Swallow evaluation, Speech therapy, Feeding assistance/environmental change, Continue to monitor while inpatient, Interdisciplinary rounds Goals: 
upgrade to puree diet x1/day, >50% ENNs x 4days, Wt. stability =/+kg x7days Nutrition Monitoring and Evaluation:  
Behavioral-Environmental Outcomes: None identified Food/Nutrient Intake Outcomes: Diet advancement/tolerance, Food and nutrient intake Physical Signs/Symptoms Outcomes: Biochemical data, Chewing or swallowing, Weight Discharge Planning:   
No discharge needs at this time

## 2021-02-18 NOTE — PROGRESS NOTES
Progress Note Patient: Carly See MRN: [id number]  SSN: [id number] YOB: 1940  Age: [de-identified] y.o. Sex: female Admit Date: 2/14/2021 LOS: 3 days Subjective:  
 
Patient presents from her long-term care facility with concern of poor p.o. intake. Patient is seen alert without distress. Patient cannot be communicated due to her baseline dementia. She is more awake today. Denies any complaints Objective:  
 
Vitals:  
 02/17/21 1947 02/18/21 0037 02/18/21 1959 02/18/21 7504 BP: (!) 94/46 (!) 107/59 104/60 (!) 114/59 Pulse: 68 64 68 65 Resp: 20 18 18 18 Temp: 97.9 °F (36.6 °C) 97.7 °F (36.5 °C) 97.5 °F (36.4 °C) 98.3 °F (36.8 °C) SpO2: 99% 98% 95% 97% Weight:      
  
 
Intake and Output: 
Current Shift: No intake/output data recorded. Last three shifts: No intake/output data recorded. Physical Exam:  
GENERAL: alert, cooperative, no distress, appears stated age THROAT & NECK: normal and no erythema or exudates noted. LUNG: clear to auscultation bilaterally HEART: regular rate and rhythm, S1, S2 normal, no murmur, click, rub or gallop ABDOMEN: soft, non-tender. Bowel sounds normal. No masses,  no organomegaly EXTREMITIES:  extremities normal, atraumatic, no cyanosis or edema SKIN: no rash or abnormalities NEUROLOGIC: Awake and alert, nonfocal 
PSYCHIATRIC: non focal 
 
Lab/Data Review: All lab results for the last 24 hours reviewed. No results found for this or any previous visit (from the past 24 hour(s)). Imaging: No results found. Assessment and Plan: BAYRON 
-Likely from prerenal cause from poor p.o. intake 
-Continue IV fluid, will change to D5 due to hyponatremia 
-Monitor renal function Hypernatremia 
-Likely from free water depletion with poor p.o. intake 
-Continue IV fluid with D5W 
-Monitor sodium level Hypokalemia 
-Also due to poor p.o. intake 
-Replace potassium Elevated troponin 
-Cardiology evaluating the patient -Patient with severe aortic stenosis, normal EF 
-Further work-up per cardiology Hypertension 
-Holding antihypertensives for now 
-Continue monitor blood pressure COVID-19 infection 
-Patient is not hypoxic and therefore does not meet criteria for remdesivir therapy 
-Chest x-ray without any acute finding 
-Continue vitamin C and zinc 
 
Dyslipidemia 
-Continue statin Dementia 
-Supportive care 
-Speech therapy to evaluate Anticipated disposition is 24 to 48 hours pending progress. Signed By: Annette Nolen MD   
 February 18, 2021

## 2021-02-19 NOTE — PROGRESS NOTES
Problem: Falls - Risk of 
Goal: *Absence of Falls Description: Document [place] Flow Fall Risk and appropriate interventions in the flowsheet. Outcome: Resolved/Met Note: Fall Risk Interventions: 
Mobility Interventions: PT Consult for mobility concerns, Utilize walker, cane, or other assistive device Mentation Interventions: More frequent rounding, Reorient patient Medication Interventions: Teach patient to arise slowly Elimination Interventions: Patient to call for help with toileting needs, Toileting schedule/hourly rounds, Call light in reach Problem: Patient Education: Go to Patient Education Activity Goal: Patient/Family Education Outcome: Resolved/Met Problem: Pressure Injury - Risk of 
Goal: *Prevention of pressure injury Description: Document Marcelino Scale and appropriate interventions in the flowsheet. Outcome: Resolved/Met Note: Pressure Injury Interventions: 
Sensory Interventions: Minimize linen layers, Keep linens dry and wrinkle-free, Maintain/enhance activity level Moisture Interventions: Absorbent underpads, Minimize layers Activity Interventions: Increase time out of bed Mobility Interventions: Turn and reposition approx. every two hours(pillow and wedges) Nutrition Interventions: Document food/fluid/supplement intake Friction and Shear Interventions: Minimize layers Problem: Patient Education: Go to Patient Education Activity Goal: Patient/Family Education Outcome: Resolved/Met Problem: Patient Education: Go to Patient Education Activity Goal: Patient/Family Education Outcome: Resolved/Met

## 2021-02-19 NOTE — PROGRESS NOTES
Pt with PIV infiltration of D5W, PIV removed, bruising noted on pt's arm above infiltration site, site marked an pt's arm wrapped with ace wrap

## 2021-02-19 NOTE — PROGRESS NOTES
Readmission Assessment Number of days since last admission?: 8-30 days Previous disposition: [address] [place] [address] Who is being interviewed?: (LTC) What was the patient's/caregiver's perception as to why they think they needed to return back to the hospital?: Other (Comment)(Pt caregiver not willing to agree to many thingg. Spoke to Nursing home about guardianship paperwork so alternatives to treatment could be available.) Did you visit your Primary Care Physician after you left the hospital, before you returned this time?: Yes Did you see a specialist, such as Cardiac, Pulmonary, Orthopedic Physician, etc. after you left the hospital?: No 
Who advised the patient to return to the hospital?: Other (Comment)(LTC) Does the patient report anything that got in the way of taking their medications?: No 
In our efforts to provide the best possible care to you and others like you, can you think of anything that we could have done to help you after you left the hospital the first time, so that you might not have needed to return so soon?: Other (Comment)(Pt caregiver not willing to agree to many thingg. Spoke to Nursing home about guardianship paperwork so alternatives to treatment could be available.)

## 2021-02-19 NOTE — PROGRESS NOTES
Care Management Interventions Mode of Transport at Discharge: BLS Current Support Network: [address] Discharge Location Discharge Placement: [address] Reason for Readmission:      Dehydration RUR Score/Risk Level:     15 PCP: First and Last name:  Whitney Carranza Name of Practice: [facility] Are you a current patient: Yes/No:  yes Did you attend your follow up appointment (s): If not, why not: Patient from LTC Top Challenges facing patient (as identified by patient/family and CM): Finances/Medication cost?      
Transportation Support system or lack thereof? Pt caregiver not willing to agree to many thingg. Spoke to Nursing home about guardianship paperwork so alternatives to treatment could be available. Living arrangements? Self-care/ADLs/Cognition? Current Advanced Directive/Advance Care Plan:  No, POA not willing to complete Plan for utilizing home health:   LTC pt Transition of Care Plan:    Based on readmission, the patient's previous Plan of Care 
 has been evaluated and/or modified.  The current Transition of Care Plan is:     LTC

## 2021-02-19 NOTE — DISCHARGE SUMMARY
Physician Discharge Summary Patient ID:   
Willy Servin [id number] 
42 y.o. 
1940 Admit date: 2/14/2021 Discharge date : 2/19/2021 Chronic Diagnoses:   
Problem List as of 2/19/2021 Never Reviewed Codes Class Noted - Resolved Anorexia ICD-10-CM: R63.0 ICD-9-CM: 783.0  2/15/2021 - Present BAYRON (acute kidney injury) ([facility] ICD-10-CM: N17.9 ICD-9-CM: 584.9  2/14/2021 - Present COVID-19 ICD-10-CM: U07.1 ICD-9-CM: 079.89  1/28/2021 - Present Dehydration ICD-10-CM: E86.0 ICD-9-CM: 276.51  1/28/2021 - Present Hypernatremia ICD-10-CM: E87.0 ICD-9-CM: 276.0  1/28/2021 - Present Failure to thrive in adult ICD-10-CM: R62.7 ICD-9-CM: 783.7  1/28/2021 - Present Essential hypertension ICD-10-CM: I10 
ICD-9-CM: 401.9  1/28/2021 - Present Hyperlipidemia ICD-10-CM: E78.5 ICD-9-CM: 272.4  1/28/2021 - Present 25 Final Diagnoses:  
BAYRON (acute kidney injury) ([facility] [N17.9] Dehydration [E86.0] Hypernatremia [E87.0] Anorexia [R63.0] Reason for Hospitalization: Poor oral intake and recent diagnosis of COVID-19 Hospital Course: Per H and P,\" [de-identified] y.o. female resident of [facility], with PMH of Hypertension, Dementia, and recent Covid infection, who was sent to ED for evaluation of poor po intake. Patient, secondary to dementia, is unable to provide any meaningful HPI. In ED, labs show BAYRON, dehydration, hypernatremia and elevated Troponin. EKG with some diffuse T wave changes. She was treated with IVF\" Overall patient has improved on IV fluids but oral intake remains poor. She needs constant reminders and encouragement for oral intake. Doxycycline 100 mg oral twice daily added for presumed UTI. Stable for discharge to long-term care Discharge Medications:  
Current Discharge Medication List  
  
CONTINUE these medications which have NOT CHANGED  Details  
potassium chloride (K-DUR, KLOR-CON) 20 mEq tablet Take 2 Tabs by mouth two (2) times a day. Qty: 120 Tab, Refills: 0  
  
cholecalciferol (VITAMIN D3) (2,000 UNITS /50 MCG) cap capsule Take 1 Cap by mouth daily. Qty: 30 Cap, Refills: 0  
  
magnesium oxide (MAG-OX) 400 mg tablet Take 1 Tab by mouth two (2) times a day. Qty: 60 Tab, Refills: 0  
  
zinc sulfate (ZINCATE) 220 (50) mg capsule Take 1 Cap by mouth daily. Qty: 30 Cap, Refills: 0  
  
ascorbic acid, vitamin C, (VITAMIN C) 500 mg tablet Take 1 Tab by mouth two (2) times a day. Qty: 60 Tab, Refills: 0  
  
acetaminophen (TylenoL) 325 mg tablet Take 325 mg by mouth every four (4) hours as needed for Pain.  
  
pantoprazole (Protonix) 40 mg tablet Take 40 mg by mouth daily. atorvastatin (Lipitor) 20 mg tablet Take 20 mg by mouth daily. tamsulosin (Flomax) 0.4 mg capsule Take 0.4 mg by mouth daily. mirabegron ER (MYRBETRIQ) 50 mg ER tablet Take 50 mg by mouth daily. STOP taking these medications  
  
 nystatin (MYCOSTATIN) 100,000 unit/mL suspension Comments:  
Reason for Stopping: Follow up Care: 1. Kiya Barrientos MD in 1-2 weeks. Please call to set up an appointment shortly after discharge. Diet:  Dysphagia diet-pureed Disposition: 
SNF. Advanced Directive:  
FULL x  
DNR Discharge Exam: 
Visit Vitals BP (!) 93/59 Pulse 85 Temp 97.8 °F (36.6 °C) Resp 20 Wt 56.7 kg (125 lb) SpO2 99% Breastfeeding No  
BMI 20.18 kg/m² O2 Device: Room air Temp (24hrs), Av.7 °F (36.5 °C), Min:96.7 °F (35.9 °C), Max:98.5 °F (36.9 °C) No intake/output data recorded. No intake/output data recorded. General:  Alert, cooperative, no distress, appears stated age. Lungs:   Clear to auscultation bilaterally. Chest wall:  No tenderness or deformity. Heart:  Regular rate and rhythm, S1, S2 normal, no murmur, click, rub or gallop. Abdomen:   Soft, non-tender. Bowel sounds normal. No masses,  No organomegaly. Extremities: Extremities normal, atraumatic, no cyanosis or edema. Pulses: 2+ and symmetric all extremities. Skin: Skin color, texture, turgor normal. No rashes or lesions Neurologic: CNII-XII intact. No gross sensory or motor deficits CONSULTATIONS: Cardiology Significant Diagnostic Studies:  
2/14/2021: BUN 52 mg/dL* (Ref range: 6 - 20 mg/dL); Calcium 9.7 mg/dL (Ref range: 8.5 - 10.1 mg/dL); CO2 31 mmol/L (Ref range: 21 - 32 mmol/L); Creatinine 1.49 mg/dL* (Ref range: 0.55 - 1.02 mg/dL); Glucose 119 mg/dL* (Ref range: 65 - 100 mg/dL); HCT 52.2 %* (Ref range: 36 - 46 %); HGB 17.1 g/dL (Ref range: 13.5 - 17.5 g/dL); Potassium 3.5 mmol/L (Ref range: 3.5 - 5.1 mmol/L); Sodium 158 mmol/L* (Ref range: 136 - 145 mmol/L) 2/15/2021: BUN 53 mg/dL* (Ref range: 6 - 20 mg/dL); Calcium 9.5 mg/dL (Ref range: 8.5 - 10.1 mg/dL); CO2 33 mmol/L* (Ref range: 21 - 32 mmol/L); Creatinine 1.51 mg/dL* (Ref range: 0.55 - 1.02 mg/dL); Glucose 125 mg/dL* (Ref range: 65 - 100 mg/dL); HCT 42.7 % (Ref range: 36 - 46 %); HGB 14.2 g/dL (Ref range: 13.5 - 17.5 g/dL); Potassium 2.7 mmol/L* (Ref range: 3.5 - 5.1 mmol/L); Sodium 160 mmol/L* (Ref range: 136 - 145 mmol/L) Recent Labs  
  02/19/21 
0600 WBC 7.2 HGB 13.5 HCT 39.2 PLT 59 Recent Labs  
  02/19/21 
0600 02/17/21 
0616 02/16/21 
1939  147* 152* K 3.0* 4.0 4.3  110* 112* CO2 29 32 34* BUN 17 29* 36* CREA 0.99 1.02 1.12* * 135* 107* CA 8.4* 9.0 9.0 No results for input(s): ALT, AP, TBIL, TBILI, TP, ALB, GLOB, GGT, AML, LPSE in the last 72 hours. No lab exists for component: SGOT, GPT, AMYP, HLPSE No results for input(s): INR, PTP, APTT, INREXT in the last 72 hours. No results for input(s): FE, TIBC, PSAT, FERR in the last 72 hours. No results for input(s): PH, PCO2, PO2 in the last 72 hours. No results for input(s): CPK, CKMB in the last 72 hours. No lab exists for component: TROPONINI No results found for: Georgiana Rizvi Total Time: 35 minutes Signed: 
Nesha Ly MD 
2/19/2021 
11:29 AM

## 2021-02-19 NOTE — PROGRESS NOTES
PHYSICAL THERAPY EVALUATION/DISCHARGE Patient: Jonathan Mireles (50 y.o. female) Date: 2/19/2021 Primary Diagnosis: BAYRON (acute kidney injury) ([facility] [N17.9] Dehydration [E86.0] Hypernatremia [E87.0] Anorexia [R63.0] Precautions: Covid+ ASSESSMENT Based on the objective data described below, the patient presents with decreased functional mobility, decreased ROM, and decreased strength. Her PROM at the B hip and knee were full range, but pt presented with B plantarflexion contractures, L > R. Pt performed a quad set on the left, but pt refused to perform on the R. Pt would not perform any bed mobility, transfers, MMT strength, or AROM. Due to her lack of command following and impaired cognition, she is not a good candidate for rehab at this time. Other factors to consider for discharge: Due to pt's impaired cognition, she was unable to answer questions regarding PLOF and unable to follow commands. Further skilled acute physical therapy is not indicated at this time. PLAN : 
Recommendation for discharge: (in order for the patient to meet his/her long term goals) No skilled physical therapy/ follow up rehabilitation needs identified at this time. This discharge recommendation: 
Has been made in collaboration with the attending provider and/or case management IF patient discharges home will need the following DME: to be determined (TBD) SUBJECTIVE:  
Patient reports that she is in pain with passive ankle and knee movements. OBJECTIVE DATA SUMMARY:  
HISTORY:   
Past Medical History:  
Diagnosis Date  COPD (chronic obstructive pulmonary disease) ([facility])  COVID-19  Dementia ([facility]  Hypertension  Hypokalemia History reviewed. No pertinent surgical history. Prior level of function: From previous acute care encounters with pt, pt was a limited household ambulator that utilized a FWW. Pt unable to report PLOF at this time due to impaired cognition.  
Personal factors and/or comorbidities impacting plan of care: Impaired Cognition Home Situation Home Environment: Long term care # Steps to Enter: 0 One/Two Story Residence: Other (Comment)(1 level) Interior Rails: None Lift Chair Available: No 
Living Alone: No 
Support Systems: Skilled nursing facility Patient Expects to be Discharged to[de-identified] Skilled nursing facility Current DME Used/Available at Home: Wheelchair EXAMINATION/PRESENTATION/DECISION MAKING:  
Critical Behavior: 
Neurologic State: Alert Orientation Level: Disoriented X4(Pt unable to answer questions due to impaired cognition) Cognition: Decreased command following, Decreased attention/concentration Hearing: Auditory Auditory Impairment: None Range Of Motion: PROM: Grossly decreased, non-functional 
   
Strength:   
Strength: Grossly decreased, non-functional(Pt performed quad set on the R) Treatment Session: Pt was supine in bed at start of treatment. She was unable to answer any history questions or follow simple commands for performance of PROM and AROM of LEs, bed mobility, and supine to sit transfer. She complained of pain with PROM of B LEs. Pt would not perform any AROM except for a quad set on the L. Pt is inappropriate for physical therapy treatment at this time due to inability to follow simple commands. Physical Therapy Evaluation Charge Determination History Examination Presentation Decision-Making HIGH Complexity :3+ comorbidities / personal factors will impact the outcome/ POC  LOW Complexity : 1-2 Standardized tests and measures addressing body structure, function, activity limitation and / or participation in recreation  LOW Complexity : Stable, uncomplicated Based on the above components, the patient evaluation is determined to be of the following complexity level: LOW Pain Rating: 
Pt unable to verbalize pain rating Activity Tolerance:  
Fair After treatment patient left in no apparent distress:  
Supine in bed and Call bell within reach COMMUNICATION/EDUCATION:  
The patients plan of care was discussed with: Physician. Fall prevention education was provided and the patient/caregiver indicated understanding. Thank you for this referral. 
Jhonny Martínez PT, DPT Time Calculation: 10 mins

## 2021-02-19 NOTE — PROGRESS NOTES
Physician Progress Note Gina Hoyt 
CSN #:                  [id number] :                       1940 ADMIT DATE:       2021 5:07 PM 
[address] [place] DATE: 
RESPONDING 
PROVIDER #:        Jeff Maher MD 
 
 
 
 
QUERY TEXT: 
 
Dear Dr. Andrew Smith: 
 
Patient admitted with BAYRON, dehydration, Covid-19, noted to have +UA findings with documentation of UTI per Cardiology consult. If possible, please document in progress notes and discharge summary if you are evaluating and/or treating any of the following: The medical record reflects the following: 
Risk Factors: [de-identified] F admitted with BAYRON, dehydration, Covid -19, dementia, FTT Clinical Indicators: UA collected on  notes +nitrites, moderate leukocyte esterase, WBC , RBC >100, bacteria 4+; no urine c/s noted Treatment: labs, IV fluids, no ABT Thank you, TOBY SesayN, RN Clinical  
[phone] Options provided: 
-- UTI based on UA findings -- UA not clinically significant 
-- Other - I will add my own diagnosis -- Disagree - Not applicable / Not valid -- Disagree - Clinically unable to determine / Unknown 
-- Refer to Clinical Documentation Reviewer PROVIDER RESPONSE TEXT: 
 
This patient has UTI based on UA findings.  
 
Query created by: Marine Garcia on 2021 5:46 AM 
 
 
Electronically signed by:  Jeff Maher MD 2021 8:19 AM

## 2021-02-19 NOTE — PROGRESS NOTES
Care Management Interventions Mode of Transport at Discharge: [facility] Current Support Network: [address] Discharge Location Discharge Placement: [address] [place] [address]

## 2021-02-23 NOTE — PROGRESS NOTES
Physician Progress Note Orquidea Dooley 
CSN #:                  [id number] :                       1940 ADMIT DATE:       2021 8:11 PM 
Jeffy Stanley DATE:        2021 2:28 PM 
RESPONDING 
PROVIDER #:        Krysta Muñoz MD 
 
 
 
 
QUERY TEXT: 
 
Dear Dr. Jill Carrillo: 
 
Pt admitted with Covid-19, COPD exacerbation, BAYRON, elevated d-dimer. Pt noted to have bloody bowel movement on  with heme+ stool per lab on . If possible, please document in progress notes and discharge summary the relationship, if any, between bloody stools and Heparin and Lovenox for elevated d-dimer levels. The medical record reflects the following: 
Risk Factors: [de-identified] F admitted with Covid-19, COPD exacerbation, BAYRON, elevated d-dimer levels Clinical Indicators: d-dimer levels 16.93. ..35.10. ..24.66...>35.20; receiving Heparin Heparin 5000u injections and Lovenox 30mg SQ every 12 hours; bloody stool noted ; heme + stool per lab  Treatment: labs, Lovenox d/c'd, Heparin adjusted, stool for OB check, GI consult Thank you, TOBY LiconaN, RN Clinical  
[facility] [phone] Options provided: 
-- Bloody stools due to extrinsic circulating anticoagulants -- Bloody stools unrelated to extrinsic circulating anticoagulants 
-- Other - I will add my own diagnosis -- Disagree - Not applicable / Not valid -- Disagree - Clinically unable to determine / Unknown 
-- Refer to Clinical Documentation Reviewer PROVIDER RESPONSE TEXT: 
 
This patient has bloody stools due to extrinsic circulating anticoagulants.  
 
Query created by: Jillian Pickens on 2021 9:38 AM 
 
 
Electronically signed by:  Krysta Muñoz MD 2021 7:06 PM

## 2021-02-25 NOTE — ED PROVIDER NOTES
HPI  
Obtained from EMS and medical records. Patient is largely unable to participate in the history and physical.  See notes from recent admission as this presentation appears to be very similar. Patient was noted to be hypotensive at her nursing home and reported to have decreased p.o. and activity. No report of fever, pain of any kind, dysuria, vomiting, GI bleeding. Past Medical History:  
Diagnosis Date  COPD (chronic obstructive pulmonary disease) ([facility])  COVID-19  Dementia ([facility] [address]  Hypertension  Hypokalemia No past surgical history on file. History reviewed. No pertinent family history. Social History Socioeconomic History  Marital status:  Spouse name: Not on file  Number of children: Not on file  Years of education: Not on file  Highest education level: Not on file Occupational History  Not on file Social Needs  Financial resource strain: Not on file  Food insecurity Worry: Not on file Inability: Not on file  Transportation needs Medical: Not on file Non-medical: Not on file Tobacco Use  Smoking status: Never Smoker  Smokeless tobacco: Never Used Substance and Sexual Activity  Alcohol use: Not Currently  Drug use: Not on file  Sexual activity: Not on file Lifestyle  Physical activity Days per week: Not on file Minutes per session: Not on file  Stress: Not on file Relationships  Social connections Talks on phone: Not on file Gets together: Not on file Attends Holiness service: Not on file Active member of club or organization: Not on file Attends meetings of clubs or organizations: Not on file Relationship status: Not on file  Intimate partner violence Fear of current or ex partner: Not on file Emotionally abused: Not on file Physically abused: Not on file Forced sexual activity: Not on file Other Topics Concern  Not on file Social History Narrative  Not on file ALLERGIES: Codeine, Ibuprofen, and Morphine Review of Systems Unable to perform ROS: Dementia Vitals:  
 02/25/21 1622 BP: (!) 77/62 Pulse: (!) 111 Resp: 20 Temp: 99 °F (37.2 °C) SpO2: 94% Physical Exam 
Vitals signs and nursing note reviewed. Constitutional:   
   General: She is not in acute distress. Appearance: She is not ill-appearing, toxic-appearing or diaphoretic. Comments: Weak and frail appearing HENT:  
   Head: Normocephalic and atraumatic. Comments: Bilateral temporal wasting Nose: Nose normal.  
   Mouth/Throat:  
   Mouth: Mucous membranes are dry. Eyes:  
   Extraocular Movements: Extraocular movements intact. Pupils: Pupils are equal, round, and reactive to light. Neck: Musculoskeletal: Normal range of motion and neck supple. No neck rigidity. Cardiovascular:  
   Rate and Rhythm: Regular rhythm. Tachycardia present. Pulses: Normal pulses. Heart sounds: No murmur. No gallop. Pulmonary:  
   Effort: Pulmonary effort is normal. No respiratory distress. Breath sounds: Normal breath sounds. No stridor. No wheezing, rhonchi or rales. Abdominal:  
   General: Abdomen is flat. There is no distension. Palpations: Abdomen is soft. There is no mass. Tenderness: There is no abdominal tenderness. There is no guarding or rebound. Hernia: No hernia is present. Musculoskeletal:     
   General: No swelling, tenderness, deformity or signs of injury. Skin: 
   General: Skin is warm and dry. Coloration: Skin is not jaundiced or pale. Findings: Bruising present. No erythema, lesion or rash. Comments: Large patches of ecchymosis and edema in the bilateral upper extremities at sites of former peripheral IV Neurological:  
   General: No focal deficit present. Mental Status: She is alert. Mental status is at baseline. She is disoriented. Cranial Nerves: No cranial nerve deficit. Sensory: No sensory deficit. Comments: Oriented to person only; can answer some questions but unclear if patient fully understands Psychiatric:     
   Mood and Affect: Mood normal.  
 
  
 
MDM Number of Diagnoses or Management Options Acute renal injury [facility]): established and worsening Dehydration: established and worsening Hypernatremia: established and worsening Amount and/or Complexity of Data Reviewed Clinical lab tests: ordered and reviewed Tests in the radiology section of CPT®: ordered and reviewed Risk of Complications, Morbidity, and/or Mortality Presenting problems: moderate Diagnostic procedures: moderate Management options: moderate General comments: Critical Care time 35 mins review of labs, IV NS, and consultation with hospitalist about admission Patient Progress Patient progress: stable This appears to be an ongoing general decline with anorexia resulting in weakness, dehydration, and hypotension. Will check labs and provide IV hydration. Critical Care Performed by: Jd Rodriguez MD 
Authorized by: Jd Rodriguez MD  
 
Critical care provider statement:  
  Critical care time (minutes):  30 Critical care was necessary to treat or prevent imminent or life-threatening deterioration of the following conditions:  Dehydration Critical care was time spent personally by me on the following activities:  Blood draw for specimens, discussions with consultants, evaluation of patient's response to treatment, examination of patient, interpretation of cardiac output measurements, obtaining history from patient or surrogate, ordering and performing treatments and interventions, ordering and review of laboratory studies, ordering and review of radiographic studies, pulse oximetry, re-evaluation of patient's condition and vascular access procedures Multiple attempts by several providers unsuccessful at starting peripheral IV. Addition, I have attempted multiple times on bilateral femoral veins with ultrasound without success. According to medical records, patient is a very difficult stick and required assistance from vascular. We are contacting [facility] to request placement of PICC or other advanced line. Right IO placed successfully with good blood return and flushed. Patient's blood pressure is gradually improving with administration of IV fluids via IR. I have obtained labs via the right femoral artery with good stasis. We will continue fluids. Care signed out to oncoming team who will admit once labs resulted. Recent Results (from the past 12 hour(s)) CBC WITH AUTOMATED DIFF Collection Time: 02/25/21  7:49 PM  
Result Value Ref Range WBC 8.3 4.4 - 11.3 K/uL  
 RBC 4.38 (L) 4.50 - 5.90 M/uL  
 HGB 13.2 (L) 13.5 - 17.5 g/dL HCT 39.7 36 - 46 % MCV 90.6 80 - 100 FL  
 MCH 30.2 (L) 31 - 34 PG  
 MCHC 33.3 31.0 - 36.0 g/dL  
 RDW 17.7 (H) 11.5 - 14.5 % PLATELET 82 K/uL MPV 9.6 6.5 - 11.5 FL  
 NRBC 2.7  WBC ABSOLUTE NRBC 0.23 K/uL NEUTROPHILS 80 (H) 42 - 75 % LYMPHOCYTES 16 (L) 20.5 - 51.1 % MONOCYTES 4 1.7 - 9.3 % EOSINOPHILS 0 (L) 0.9 - 2.9 % BASOPHILS 0 0.0 - 2.5 % ABS. NEUTROPHILS 6.7 1.8 - 7.7 K/UL  
 ABS. LYMPHOCYTES 1.3 1.0 - 4.8 K/UL  
 ABS. MONOCYTES 0.3 0.2 - 2.4 K/UL  
 ABS. EOSINOPHILS 0.0 0.0 - 0.7 K/UL  
 ABS. BASOPHILS 0.0 0.0 - 0.2 K/UL METABOLIC PANEL, COMPREHENSIVE Collection Time: 02/25/21  7:49 PM  
Result Value Ref Range Sodium 149 (H) 136 - 145 mmol/L Potassium 3.5 3.5 - 5.1 mmol/L Chloride 108 97 - 108 mmol/L  
 CO2 16 (L) 21 - 32 mmol/L Anion gap 25 (H) 5 - 15 mmol/L Glucose 179 (H) 65 - 100 mg/dL BUN 90 (H) 6 - 20 mg/dL Creatinine 2.80 (H) 0.55 - 1.02 mg/dL BUN/Creatinine ratio 32 (H) 12 - 20 GFR est AA 20 (L) >60 ml/min/1.73m2 GFR est non-AA 16 (L) >60 ml/min/1.73m2 Calcium 8.8 8.5 - 10.1 mg/dL Bilirubin, total 2.6 (H) 0.2 - 1.0 mg/dL AST (SGOT) 35 15 - 37 U/L  
 ALT (SGPT) 22 12 - 78 U/L Alk. phosphatase 96 45 - 117 U/L Protein, total 5.9 (L) 6.4 - 8.2 g/dL Albumin 2.8 (L) 3.5 - 5.0 g/dL Globulin 3.1 2.0 - 4.0 g/dL A-G Ratio 0.9 (L) 1.1 - 2.2

## 2021-02-25 NOTE — ED TRIAGE NOTES
Pt is a resident of [facility]. Per report pt has not been eating or drinking and is reportedly hypotensive

## 2021-02-26 NOTE — ED NOTES
Naaman Primrose (NP)who obtained a (DNR) Do Not Resusitate (status from patient's sister(Aditi Logan) who has the [address] [place] [address]. The patient was declared DNR status at 12:20am on 02/26/2021. Samm Wahl  phone #[phone]). Patient's sister agreed to consent over the phone. Pronounce death: 
 
Patient was a DNR. She was found without a pulse, blood/pressure, no breath sound noted. She did not respond to verbal call or deep stimulus. She was pronounced dead at 47. Patient's sister Samm Wahl ([phone]) will be call by nurse on staff.   
 
Matt Giles MD

## 2021-02-26 NOTE — PROGRESS NOTES
Notified by nurses attending Ms. Kalee, that there is obvious blood in patient's stool. Initial HGB 13.2. However pt was hypotensive and tachycardic. IVF appeared to be resolving the issue, however, given her poor prognosis and comorbid factors, goals of care need to be clarified with family. Attempted again to reach patients medical surrogate Clinton Bis, with no success. Message left.

## 2021-02-26 NOTE — ED NOTES
[facility] requested by patients family. Wendy Hirschalex 25 notified pt is ready for .

## 2021-02-26 NOTE — H&P
History and Physical 
 
Patient: Leda Rodriguez MRN: [id number]  SSN: [id number] YOB: 1940  Age: [de-identified] y.o. Sex: female Subjective:  
  
Leda Rodriguez is a [de-identified] y.o. female from [facility], with PMH of COPD, Hypertension, Hypokalemia, Covid and Dementia, and recent Covid infection, who was sent to the ED for evaluation of poor po intake. Patient, secondary to dementia, is unable to provide any meaningful HPI. In ED, labs show BAYRON, dehydration, hypernatremia. She was afebrile, and hypotensive/tachycardic upon arrival. Multiple attemps at peripheral and central IV access unsuccessful. An I/O placed to E for IVF and medication infusion. Past Medical History:  
Diagnosis Date  COPD (chronic obstructive pulmonary disease) ([facility])  COVID-19  Dementia ([facility] [address]  Hypertension  Hypokalemia No past surgical history on file. History reviewed. No pertinent family history. Social History Tobacco Use  Smoking status: Never Smoker  Smokeless tobacco: Never Used Substance Use Topics  Alcohol use: Not Currently Prior to Admission medications Medication Sig Start Date End Date Taking? Authorizing Provider  
doxycycline (ADOXA) 100 mg tablet Take 1 Tab by mouth two (2) times a day for 7 days. 2/19/21 2/26/21  Julio Engel MD  
potassium chloride (K-DUR, KLOR-CON) 20 mEq tablet Take 2 Tabs by mouth two (2) times a day. 2/4/21   Ania Broussard MD  
cholecalciferol (VITAMIN D3) (2,000 UNITS /50 MCG) cap capsule Take 1 Cap by mouth daily. 2/5/21   Ania Broussard MD  
magnesium oxide (MAG-OX) 400 mg tablet Take 1 Tab by mouth two (2) times a day. 2/4/21   Ania Broussard MD  
zinc sulfate (ZINCATE) 220 (50) mg capsule Take 1 Cap by mouth daily. 2/5/21   Ania Broussard MD  
ascorbic acid, vitamin C, (VITAMIN C) 500 mg tablet Take 1 Tab by mouth two (2) times a day.  2/4/21   Merlyn Mendez MD  
acetaminophen (TylenoL) 325 mg tablet Take 325 mg by mouth every four (4) hours as needed for Pain. Trinh Ignacio MD  
pantoprazole (Protonix) 40 mg tablet Take 40 mg by mouth daily. Trinh Ignacio MD  
atorvastatin (Lipitor) 20 mg tablet Take 20 mg by mouth daily. Trinh Ignacio MD  
tamsulosin (Flomax) 0.4 mg capsule Take 0.4 mg by mouth daily. Trinh Ignacio MD  
mirabegron ER (MYRBETRIQ) 50 mg ER tablet Take 50 mg by mouth daily. Trinh Ignacio MD  
  
 
Allergies Allergen Reactions  Codeine Rash  Ibuprofen Unknown (comments)  Morphine Rash Review of Systems: 
Review of systems not obtained due to patient factors. Objective:  
 
Vitals:  
 02/25/21 1622 02/25/21 1915 BP: (!) 77/62 Pulse: (!) 111 (!) 107 Resp: 20 Temp: 99 °F (37.2 °C) SpO2: 94% Physical Exam: 
GEN: Thin,elderly female, NAD HEENT: Atraumatic, EOMi, dry oral mucosa NECK: Supple, trachea midline, no JVD PULM: CTAB, no accessory muscle use CV/PV: Tachycardia, No M/R/G. No edema GI: Soft, non-tender, +BS 
: Deferred MSK: GARVEY. RLE intraosseous access noted INTEG: Warm, dry, intact. NEURO: Alert. Oriented to self, confused to place, time, event. PSYCH:No anxiety or agitation Assessment:  
 
 
 
Plan:  
 
Place in Observation DX: BAYRON, Dehydration, Hypernatremia ELOS 2MN Activity Bed rest w BCS Cardiac-Mech Soft Diet Reconcile home meds VTE ppx: LMWH Medical surrogate is sister Aman Tovar [phone] Pt is a Full Code - will need to discuss w family 
  
SIRS 
- Hypotensive, tachycardic, low grade fever (99) 
- Obtain UA, CXR, BLD CX, Lactate level BAYRON 
- Cr 2.8 (on 2/19 Cr 0.99) 
- 2/2 poor po intake - IVF as ordered - Obtain UA 
- Nephrology consult if needed  
  
Hypernatremia - Sodium 149 
- Given 1L NSS in ED 
- Continue NSS @ 125/hr 
- Repeat BMP in am 
   
Hypertension w Tachycardia - BP 77/62-90/50 -120's - Improving w IVF 
- Hold anti-hypertensives - Monitor BP  
  
Hyperlipidemia 
- Continue Statin   Prior Covid infection - Recently discharged (2/4) w Covid 19 
- Repeat Rapid Covid - If positive - Observe Droplet Precautions - No hypoxia 
  
Dementia - Chronic - Family/Medical surrogate need to discuss goals of care Pt will need PICC line as she has very poor peripheral access Total time spent in consultation and coordination of care - 50 mins Signed By: Chirag White NP February 25, 2021

## 2021-11-03 NOTE — ROUTINE PROCESS
Labs were drawn and sent to lab. Patient's wife called (AKRTHIKEYAN confirmed) and wanted to discuss a new game plan for Patient. Provider recommended that Patient do a program with our [facility] but they had no openings in the near future and do not offer virtual. On top of that Patient can't hear well and would struggle with these programs. They wanted to discuss other options. Patient's wife wanted to show that they are trying but are finding options difficult. Would appreciate callback to discuss.

## 2023-05-24 RX ORDER — ACETAMINOPHEN 325 MG/1
650 TABLET ORAL EVERY 6 HOURS PRN
COMMUNITY
Start: 2021-01-05

## 2023-05-24 RX ORDER — TAMSULOSIN HYDROCHLORIDE 0.4 MG/1
0.4 CAPSULE ORAL DAILY
COMMUNITY
Start: 2021-01-05

## 2023-05-24 RX ORDER — POTASSIUM CHLORIDE 20 MEQ/1
20 TABLET, EXTENDED RELEASE ORAL DAILY
COMMUNITY
Start: 2021-01-05

## 2023-05-24 RX ORDER — ATORVASTATIN CALCIUM 20 MG/1
20 TABLET, FILM COATED ORAL
COMMUNITY
Start: 2021-01-05

## 2023-05-24 RX ORDER — PANTOPRAZOLE SODIUM 40 MG/1
40 TABLET, DELAYED RELEASE ORAL DAILY
COMMUNITY
Start: 2021-01-05

## 2023-05-24 RX ORDER — METOPROLOL SUCCINATE 50 MG/1
50 TABLET, EXTENDED RELEASE ORAL DAILY
COMMUNITY
Start: 2021-01-05

## (undated) DEVICE — LIGHT HANDLE: Brand: DEVON

## (undated) DEVICE — INFECTION CONTROL KIT SYS

## (undated) DEVICE — DERMABOND SKIN ADH 0.7ML -- DERMABOND ADVANCED 12/BX

## (undated) DEVICE — STERILE POLYISOPRENE POWDER-FREE SURGICAL GLOVES: Brand: PROTEXIS

## (undated) DEVICE — NEEDLE HYPO 25GA L1.5IN BVL ORIENTED ECLIPSE

## (undated) DEVICE — SUTURE VCRL SZ 3-0 L27IN ABSRB UD L26MM SH 1/2 CIR J416H

## (undated) DEVICE — SUTURE MCRYL SZ 4-0 L27IN ABSRB UD L19MM PS-2 1/2 CIR PRIM Y426H

## (undated) DEVICE — DEVON™ KNEE AND BODY STRAP 60" X 3" (1.5 M X 7.6 CM): Brand: DEVON

## (undated) DEVICE — CHEST PACK: Brand: MEDLINE INDUSTRIES, INC.

## (undated) DEVICE — ROCKER SWITCH PENCIL BLADE ELECTRODE, HOLSTER: Brand: EDGE

## (undated) DEVICE — KENDALL SCD EXPRESS SLEEVES, KNEE LENGTH, MEDIUM: Brand: KENDALL SCD

## (undated) DEVICE — SOL IRRIGATION INJ NACL 0.9% 500ML BTL

## (undated) DEVICE — SUT SLK 3-0 30IN SH BLK --